# Patient Record
Sex: MALE | Race: WHITE | NOT HISPANIC OR LATINO | URBAN - METROPOLITAN AREA
[De-identification: names, ages, dates, MRNs, and addresses within clinical notes are randomized per-mention and may not be internally consistent; named-entity substitution may affect disease eponyms.]

---

## 2018-03-05 ENCOUNTER — INPATIENT (INPATIENT)
Facility: HOSPITAL | Age: 68
LOS: 3 days | Discharge: STILL ADMITTED OR ADMINISTRATIVE | DRG: 638 | End: 2018-03-09
Attending: INTERNAL MEDICINE | Admitting: HOSPITALIST
Payer: COMMERCIAL

## 2018-03-05 VITALS
HEART RATE: 79 BPM | DIASTOLIC BLOOD PRESSURE: 86 MMHG | RESPIRATION RATE: 16 BRPM | TEMPERATURE: 99 F | SYSTOLIC BLOOD PRESSURE: 118 MMHG | OXYGEN SATURATION: 95 % | HEIGHT: 70 IN | WEIGHT: 199.96 LBS

## 2018-03-05 DIAGNOSIS — Z29.9 ENCOUNTER FOR PROPHYLACTIC MEASURES, UNSPECIFIED: ICD-10-CM

## 2018-03-05 DIAGNOSIS — G40.909 EPILEPSY, UNSPECIFIED, NOT INTRACTABLE, WITHOUT STATUS EPILEPTICUS: ICD-10-CM

## 2018-03-05 DIAGNOSIS — E78.5 HYPERLIPIDEMIA, UNSPECIFIED: ICD-10-CM

## 2018-03-05 DIAGNOSIS — H40.9 UNSPECIFIED GLAUCOMA: ICD-10-CM

## 2018-03-05 DIAGNOSIS — I10 ESSENTIAL (PRIMARY) HYPERTENSION: ICD-10-CM

## 2018-03-05 DIAGNOSIS — E87.1 HYPO-OSMOLALITY AND HYPONATREMIA: ICD-10-CM

## 2018-03-05 DIAGNOSIS — E11.01 TYPE 2 DIABETES MELLITUS WITH HYPEROSMOLARITY WITH COMA: ICD-10-CM

## 2018-03-05 DIAGNOSIS — K59.00 CONSTIPATION, UNSPECIFIED: ICD-10-CM

## 2018-03-05 DIAGNOSIS — E11.9 TYPE 2 DIABETES MELLITUS WITHOUT COMPLICATIONS: ICD-10-CM

## 2018-03-05 DIAGNOSIS — F31.9 BIPOLAR DISORDER, UNSPECIFIED: ICD-10-CM

## 2018-03-05 LAB
ACETONE SERPL-MCNC: NEGATIVE — SIGNIFICANT CHANGE UP
ALBUMIN SERPL ELPH-MCNC: 4.5 G/DL — SIGNIFICANT CHANGE UP (ref 3.3–5.2)
ALP SERPL-CCNC: 135 U/L — HIGH (ref 40–120)
ALT FLD-CCNC: 12 U/L — SIGNIFICANT CHANGE UP
ANION GAP SERPL CALC-SCNC: 19 MMOL/L — HIGH (ref 5–17)
APPEARANCE UR: CLEAR — SIGNIFICANT CHANGE UP
AST SERPL-CCNC: 13 U/L — SIGNIFICANT CHANGE UP
BASE EXCESS BLDV CALC-SCNC: 3.9 MMOL/L — HIGH (ref -2–2)
BASOPHILS # BLD AUTO: 0 K/UL — SIGNIFICANT CHANGE UP (ref 0–0.2)
BASOPHILS NFR BLD AUTO: 0.1 % — SIGNIFICANT CHANGE UP (ref 0–2)
BILIRUB SERPL-MCNC: 0.7 MG/DL — SIGNIFICANT CHANGE UP (ref 0.4–2)
BILIRUB UR-MCNC: NEGATIVE — SIGNIFICANT CHANGE UP
BUN SERPL-MCNC: 35 MG/DL — HIGH (ref 8–20)
CA-I SERPL-SCNC: 1.13 MMOL/L — LOW (ref 1.15–1.33)
CALCIUM SERPL-MCNC: 10.5 MG/DL — HIGH (ref 8.6–10.2)
CHLORIDE BLDV-SCNC: 101 MMOL/L — SIGNIFICANT CHANGE UP (ref 98–107)
CHLORIDE SERPL-SCNC: 85 MMOL/L — LOW (ref 98–107)
CK MB CFR SERPL CALC: 2 NG/ML — SIGNIFICANT CHANGE UP (ref 0–6.7)
CK SERPL-CCNC: 165 U/L — SIGNIFICANT CHANGE UP (ref 30–200)
CO2 SERPL-SCNC: 28 MMOL/L — SIGNIFICANT CHANGE UP (ref 22–29)
COLOR SPEC: YELLOW — SIGNIFICANT CHANGE UP
CREAT SERPL-MCNC: 1.47 MG/DL — HIGH (ref 0.5–1.3)
DIFF PNL FLD: NEGATIVE — SIGNIFICANT CHANGE UP
EOSINOPHIL # BLD AUTO: 0 K/UL — SIGNIFICANT CHANGE UP (ref 0–0.5)
EOSINOPHIL NFR BLD AUTO: 0.1 % — SIGNIFICANT CHANGE UP (ref 0–5)
GAS PNL BLDV: 146 MMOL/L — HIGH (ref 135–145)
GAS PNL BLDV: SIGNIFICANT CHANGE UP
GAS PNL BLDV: SIGNIFICANT CHANGE UP
GLUCOSE BLDC GLUCOMTR-MCNC: 519 MG/DL — CRITICAL HIGH (ref 70–99)
GLUCOSE BLDV-MCNC: 680 MG/DL — CRITICAL HIGH (ref 70–99)
GLUCOSE SERPL-MCNC: 1092 MG/DL — CRITICAL HIGH (ref 70–115)
GLUCOSE UR QL: 1000 MG/DL
HCO3 BLDV-SCNC: 28 MMOL/L — HIGH (ref 20–26)
HCT VFR BLD CALC: 45.2 % — SIGNIFICANT CHANGE UP (ref 42–52)
HCT VFR BLDA CALC: 51 — HIGH (ref 39–50)
HGB BLD CALC-MCNC: 16.6 G/DL — SIGNIFICANT CHANGE UP (ref 13–17)
HGB BLD-MCNC: 15.8 G/DL — SIGNIFICANT CHANGE UP (ref 14–18)
KETONES UR-MCNC: NEGATIVE — SIGNIFICANT CHANGE UP
LACTATE BLDV-MCNC: 2.3 MMOL/L — HIGH (ref 0.5–2)
LEUKOCYTE ESTERASE UR-ACNC: NEGATIVE — SIGNIFICANT CHANGE UP
LYMPHOCYTES # BLD AUTO: 1 K/UL — SIGNIFICANT CHANGE UP (ref 1–4.8)
LYMPHOCYTES # BLD AUTO: 7.9 % — LOW (ref 20–55)
MCHC RBC-ENTMCNC: 28.1 PG — SIGNIFICANT CHANGE UP (ref 27–31)
MCHC RBC-ENTMCNC: 34.1 G/DL — SIGNIFICANT CHANGE UP (ref 32–36)
MCV RBC AUTO: 80.3 FL — SIGNIFICANT CHANGE UP (ref 80–94)
MONOCYTES # BLD AUTO: 0.9 K/UL — HIGH (ref 0–0.8)
MONOCYTES NFR BLD AUTO: 6.9 % — SIGNIFICANT CHANGE UP (ref 3–10)
NEUTROPHILS # BLD AUTO: 10.9 K/UL — HIGH (ref 1.8–8)
NEUTROPHILS NFR BLD AUTO: 84.6 % — HIGH (ref 37–73)
NITRITE UR-MCNC: NEGATIVE — SIGNIFICANT CHANGE UP
OTHER CELLS CSF MANUAL: 22 ML/DL — SIGNIFICANT CHANGE UP (ref 18–22)
PCO2 BLDV: 61 MMHG — HIGH (ref 35–50)
PH BLDV: 7.33 — SIGNIFICANT CHANGE UP (ref 7.32–7.43)
PH UR: 6 — SIGNIFICANT CHANGE UP (ref 5–8)
PLATELET # BLD AUTO: 189 K/UL — SIGNIFICANT CHANGE UP (ref 150–400)
PO2 BLDV: 186 MMHG — HIGH (ref 25–45)
POTASSIUM BLDV-SCNC: 5.8 MMOL/L — HIGH (ref 3.4–4.5)
POTASSIUM SERPL-MCNC: 4.8 MMOL/L — SIGNIFICANT CHANGE UP (ref 3.5–5.3)
POTASSIUM SERPL-SCNC: 4.8 MMOL/L — SIGNIFICANT CHANGE UP (ref 3.5–5.3)
PROT SERPL-MCNC: 9.4 G/DL — HIGH (ref 6.6–8.7)
PROT UR-MCNC: NEGATIVE MG/DL — SIGNIFICANT CHANGE UP
RBC # BLD: 5.62 M/UL — SIGNIFICANT CHANGE UP (ref 4.6–6.2)
RBC # FLD: 13.1 % — SIGNIFICANT CHANGE UP (ref 11–15.6)
SAO2 % BLDV: 99 % — SIGNIFICANT CHANGE UP
SODIUM SERPL-SCNC: 132 MMOL/L — LOW (ref 135–145)
SP GR SPEC: 1 — LOW (ref 1.01–1.02)
TROPONIN T SERPL-MCNC: <0.01 NG/ML — SIGNIFICANT CHANGE UP (ref 0–0.06)
UROBILINOGEN FLD QL: NEGATIVE MG/DL — SIGNIFICANT CHANGE UP
WBC # BLD: 12.9 K/UL — HIGH (ref 4.8–10.8)
WBC # FLD AUTO: 12.9 K/UL — HIGH (ref 4.8–10.8)

## 2018-03-05 PROCEDURE — 99223 1ST HOSP IP/OBS HIGH 75: CPT | Mod: GC

## 2018-03-05 PROCEDURE — 99285 EMERGENCY DEPT VISIT HI MDM: CPT

## 2018-03-05 PROCEDURE — 70450 CT HEAD/BRAIN W/O DYE: CPT | Mod: 26

## 2018-03-05 PROCEDURE — 93010 ELECTROCARDIOGRAM REPORT: CPT

## 2018-03-05 RX ORDER — ISOSORBIDE MONONITRATE 60 MG/1
30 TABLET, EXTENDED RELEASE ORAL DAILY
Qty: 0 | Refills: 0 | Status: DISCONTINUED | OUTPATIENT
Start: 2018-03-06 | End: 2018-03-07

## 2018-03-05 RX ORDER — INSULIN HUMAN 100 [IU]/ML
10 INJECTION, SOLUTION SUBCUTANEOUS ONCE
Qty: 0 | Refills: 0 | Status: COMPLETED | OUTPATIENT
Start: 2018-03-05 | End: 2018-03-05

## 2018-03-05 RX ORDER — INSULIN LISPRO 100/ML
VIAL (ML) SUBCUTANEOUS
Qty: 0 | Refills: 0 | Status: DISCONTINUED | OUTPATIENT
Start: 2018-03-05 | End: 2018-03-06

## 2018-03-05 RX ORDER — QUETIAPINE FUMARATE 200 MG/1
100 TABLET, FILM COATED ORAL DAILY
Qty: 0 | Refills: 0 | Status: DISCONTINUED | OUTPATIENT
Start: 2018-03-06 | End: 2018-03-09

## 2018-03-05 RX ORDER — INSULIN GLARGINE 100 [IU]/ML
15 INJECTION, SOLUTION SUBCUTANEOUS AT BEDTIME
Qty: 0 | Refills: 0 | Status: DISCONTINUED | OUTPATIENT
Start: 2018-03-06 | End: 2018-03-06

## 2018-03-05 RX ORDER — DIVALPROEX SODIUM 500 MG/1
500 TABLET, DELAYED RELEASE ORAL DAILY
Qty: 0 | Refills: 0 | Status: DISCONTINUED | OUTPATIENT
Start: 2018-03-06 | End: 2018-03-09

## 2018-03-05 RX ORDER — DOCUSATE SODIUM 100 MG
100 CAPSULE ORAL
Qty: 0 | Refills: 0 | Status: DISCONTINUED | OUTPATIENT
Start: 2018-03-05 | End: 2018-03-09

## 2018-03-05 RX ORDER — DEXTROSE 50 % IN WATER 50 %
25 SYRINGE (ML) INTRAVENOUS ONCE
Qty: 0 | Refills: 0 | Status: DISCONTINUED | OUTPATIENT
Start: 2018-03-05 | End: 2018-03-06

## 2018-03-05 RX ORDER — INSULIN LISPRO 100/ML
VIAL (ML) SUBCUTANEOUS AT BEDTIME
Qty: 0 | Refills: 0 | Status: DISCONTINUED | OUTPATIENT
Start: 2018-03-05 | End: 2018-03-06

## 2018-03-05 RX ORDER — INSULIN GLARGINE 100 [IU]/ML
15 INJECTION, SOLUTION SUBCUTANEOUS AT BEDTIME
Qty: 0 | Refills: 0 | Status: COMPLETED | OUTPATIENT
Start: 2018-03-05 | End: 2018-03-05

## 2018-03-05 RX ORDER — ONDANSETRON 8 MG/1
4 TABLET, FILM COATED ORAL ONCE
Qty: 0 | Refills: 0 | Status: DISCONTINUED | OUTPATIENT
Start: 2018-03-05 | End: 2018-03-05

## 2018-03-05 RX ORDER — DEXTROSE 50 % IN WATER 50 %
1 SYRINGE (ML) INTRAVENOUS ONCE
Qty: 0 | Refills: 0 | Status: DISCONTINUED | OUTPATIENT
Start: 2018-03-05 | End: 2018-03-06

## 2018-03-05 RX ORDER — GLUCAGON INJECTION, SOLUTION 0.5 MG/.1ML
1 INJECTION, SOLUTION SUBCUTANEOUS ONCE
Qty: 0 | Refills: 0 | Status: DISCONTINUED | OUTPATIENT
Start: 2018-03-05 | End: 2018-03-06

## 2018-03-05 RX ORDER — PANTOPRAZOLE SODIUM 20 MG/1
40 TABLET, DELAYED RELEASE ORAL ONCE
Qty: 0 | Refills: 0 | Status: DISCONTINUED | OUTPATIENT
Start: 2018-03-05 | End: 2018-03-05

## 2018-03-05 RX ORDER — LATANOPROST 0.05 MG/ML
1 SOLUTION/ DROPS OPHTHALMIC; TOPICAL AT BEDTIME
Qty: 0 | Refills: 0 | Status: DISCONTINUED | OUTPATIENT
Start: 2018-03-05 | End: 2018-03-09

## 2018-03-05 RX ORDER — BRIMONIDINE TARTRATE 2 MG/MG
1 SOLUTION/ DROPS OPHTHALMIC EVERY 12 HOURS
Qty: 0 | Refills: 0 | Status: DISCONTINUED | OUTPATIENT
Start: 2018-03-06 | End: 2018-03-09

## 2018-03-05 RX ORDER — SODIUM CHLORIDE 9 MG/ML
2000 INJECTION INTRAMUSCULAR; INTRAVENOUS; SUBCUTANEOUS ONCE
Qty: 0 | Refills: 0 | Status: COMPLETED | OUTPATIENT
Start: 2018-03-05 | End: 2018-03-05

## 2018-03-05 RX ORDER — SODIUM CHLORIDE 9 MG/ML
1000 INJECTION INTRAMUSCULAR; INTRAVENOUS; SUBCUTANEOUS
Qty: 0 | Refills: 0 | Status: DISCONTINUED | OUTPATIENT
Start: 2018-03-05 | End: 2018-03-07

## 2018-03-05 RX ORDER — HALOPERIDOL DECANOATE 100 MG/ML
50 INJECTION INTRAMUSCULAR
Qty: 0 | Refills: 0 | Status: CANCELLED | OUTPATIENT
Start: 2018-03-26 | End: 2018-03-09

## 2018-03-05 RX ORDER — POLYETHYLENE GLYCOL 3350 17 G/17G
17 POWDER, FOR SOLUTION ORAL DAILY
Qty: 0 | Refills: 0 | Status: DISCONTINUED | OUTPATIENT
Start: 2018-03-05 | End: 2018-03-09

## 2018-03-05 RX ORDER — SODIUM CHLORIDE 9 MG/ML
1000 INJECTION, SOLUTION INTRAVENOUS
Qty: 0 | Refills: 0 | Status: DISCONTINUED | OUTPATIENT
Start: 2018-03-05 | End: 2018-03-06

## 2018-03-05 RX ORDER — SODIUM CHLORIDE 9 MG/ML
1000 INJECTION INTRAMUSCULAR; INTRAVENOUS; SUBCUTANEOUS ONCE
Qty: 0 | Refills: 0 | Status: COMPLETED | OUTPATIENT
Start: 2018-03-05 | End: 2018-03-05

## 2018-03-05 RX ORDER — DIVALPROEX SODIUM 500 MG/1
1000 TABLET, DELAYED RELEASE ORAL DAILY
Qty: 0 | Refills: 0 | Status: DISCONTINUED | OUTPATIENT
Start: 2018-03-06 | End: 2018-03-09

## 2018-03-05 RX ORDER — INSULIN HUMAN 100 [IU]/ML
8 INJECTION, SOLUTION SUBCUTANEOUS ONCE
Qty: 0 | Refills: 0 | Status: COMPLETED | OUTPATIENT
Start: 2018-03-05 | End: 2018-03-05

## 2018-03-05 RX ORDER — QUETIAPINE FUMARATE 200 MG/1
300 TABLET, FILM COATED ORAL DAILY
Qty: 0 | Refills: 0 | Status: DISCONTINUED | OUTPATIENT
Start: 2018-03-06 | End: 2018-03-09

## 2018-03-05 RX ORDER — FLUOXETINE HCL 10 MG
20 CAPSULE ORAL DAILY
Qty: 0 | Refills: 0 | Status: DISCONTINUED | OUTPATIENT
Start: 2018-03-06 | End: 2018-03-09

## 2018-03-05 RX ORDER — METOPROLOL TARTRATE 50 MG
50 TABLET ORAL EVERY 12 HOURS
Qty: 0 | Refills: 0 | Status: DISCONTINUED | OUTPATIENT
Start: 2018-03-06 | End: 2018-03-06

## 2018-03-05 RX ORDER — ENOXAPARIN SODIUM 100 MG/ML
40 INJECTION SUBCUTANEOUS EVERY 24 HOURS
Qty: 0 | Refills: 0 | Status: DISCONTINUED | OUTPATIENT
Start: 2018-03-05 | End: 2018-03-06

## 2018-03-05 RX ORDER — SIMVASTATIN 20 MG/1
20 TABLET, FILM COATED ORAL AT BEDTIME
Qty: 0 | Refills: 0 | Status: DISCONTINUED | OUTPATIENT
Start: 2018-03-05 | End: 2018-03-09

## 2018-03-05 RX ORDER — DEXTROSE 50 % IN WATER 50 %
12.5 SYRINGE (ML) INTRAVENOUS ONCE
Qty: 0 | Refills: 0 | Status: DISCONTINUED | OUTPATIENT
Start: 2018-03-05 | End: 2018-03-06

## 2018-03-05 RX ORDER — LITHIUM CARBONATE 300 MG/1
450 TABLET, EXTENDED RELEASE ORAL DAILY
Qty: 0 | Refills: 0 | Status: DISCONTINUED | OUTPATIENT
Start: 2018-03-06 | End: 2018-03-09

## 2018-03-05 RX ADMIN — INSULIN HUMAN 10 UNIT(S): 100 INJECTION, SOLUTION SUBCUTANEOUS at 22:24

## 2018-03-05 RX ADMIN — SODIUM CHLORIDE 2000 MILLILITER(S): 9 INJECTION INTRAMUSCULAR; INTRAVENOUS; SUBCUTANEOUS at 15:00

## 2018-03-05 RX ADMIN — INSULIN HUMAN 8 UNIT(S): 100 INJECTION, SOLUTION SUBCUTANEOUS at 18:45

## 2018-03-05 RX ADMIN — SODIUM CHLORIDE 150 MILLILITER(S): 9 INJECTION INTRAMUSCULAR; INTRAVENOUS; SUBCUTANEOUS at 23:26

## 2018-03-05 RX ADMIN — Medication 100 MILLIGRAM(S): at 23:00

## 2018-03-05 RX ADMIN — INSULIN HUMAN 10 UNIT(S): 100 INJECTION, SOLUTION SUBCUTANEOUS at 16:00

## 2018-03-05 RX ADMIN — LATANOPROST 1 DROP(S): 0.05 SOLUTION/ DROPS OPHTHALMIC; TOPICAL at 22:24

## 2018-03-05 RX ADMIN — SODIUM CHLORIDE 1000 MILLILITER(S): 9 INJECTION INTRAMUSCULAR; INTRAVENOUS; SUBCUTANEOUS at 18:45

## 2018-03-05 RX ADMIN — INSULIN GLARGINE 15 UNIT(S): 100 INJECTION, SOLUTION SUBCUTANEOUS at 23:42

## 2018-03-05 RX ADMIN — INSULIN HUMAN 10 UNIT(S): 100 INJECTION, SOLUTION SUBCUTANEOUS at 23:42

## 2018-03-05 NOTE — H&P ADULT - PROBLEM SELECTOR PLAN 3
Controlled, stable  -c/t outpatient regimen of depakote (500mg ER at 10am, 1000mg ER at 6pm)  -f/up am depakote level

## 2018-03-05 NOTE — H&P ADULT - PROBLEM SELECTOR PLAN 6
Controlled outpatient  -c/t home med metoprolol 50 BID starting 3/6 at 10am and 6pm  -c/t isosorbide mononitrate ER 30 daily at 10am

## 2018-03-05 NOTE — ED ADULT TRIAGE NOTE - CHIEF COMPLAINT QUOTE
sent from nursing home for hyperglycemia. hx of diabetes. read high on glucometer. has no complaints. alert able to state name. has hx of dementia, baseline confused. breathing even and unlabored.

## 2018-03-05 NOTE — H&P ADULT - NSHPSOCIALHISTORY_GEN_ALL_CORE
Lives alone at [facility] and rehab  Ambulates "independently" though patient states he "falls a lot and requires 2-3 people to assist him"  Never smoked, no drugs,   Last alcoholic drink 8 years ago, had 1 pint of scotch/whiskey a day

## 2018-03-05 NOTE — ED ADULT NURSE NOTE - PMH
Bipolar 1 disorder    Depression    Diabetes    Edema    Hepatitis    HTN (hypertension)    Hyperlipidemia    Seizure disorder

## 2018-03-05 NOTE — H&P ADULT - NSHPLABSRESULTS_GEN_ALL_CORE
15.8   12.9  )-----------( 189      ( 05 Mar 2018 15:04 )             45.2   03-05    132<L>  |  85<L>  |  35.0<H>  ----------------------------<  1092<HH>  4.8   |  28.0  |  1.47<H>    Ca    10.5<H>      05 Mar 2018 15:04    TPro  9.4<H>  /  Alb  4.5  /  TBili  0.7  /  DBili  x   /  AST  13  /  ALT  12  /  AlkPhos  135<H>  03-05    EKG: NSR Pulse 93bpm

## 2018-03-05 NOTE — H&P ADULT - NSHPPHYSICALEXAM_GEN_ALL_CORE
Vital Signs Last 24 Hrs  T(C): 36.2 (05 Mar 2018 21:23), Max: 37.1 (05 Mar 2018 14:30)  T(F): 97.1 (05 Mar 2018 21:23), Max: 98.7 (05 Mar 2018 14:30)  HR: 93 (05 Mar 2018 21:23) (79 - 93)  BP: 156/96 (05 Mar 2018 21:23) (118/86 - 156/96)  BP(mean): --  RR: 16 (05 Mar 2018 21:23) (16 - 16)  SpO2: 95% (05 Mar 2018 21:23) (95% - 95%)    PHYSICAL EXAM:      Constitutional: NAD, soaked in urine in stretcher, malodorous  HEENT: PERRLA, EOMI, Normal Hearing  Neck: No JVD, supple  Back: Normal spine flexure, No CVA tenderness  Respiratory: CTABL no w/r/r  Cardiovascular: S1 and S2, RRR, no m/r/g; +TTP diffusely across sternum;   Gastrointestinal: BS+ normoactive, softly distended, +diffuse mild TTP across all quadrants, patient stating "I feel like I have to go to the bathroom", no suprapubic discomfort, no guarding/rigidity  Extremities: No c/c/e  Vascular: 2+ peripheral pulses  Neurological: AAO x 1 (to self, not to place or year), no focal deficits; sensation intact in LE b/l  Psychiatric: Pleasant mood, normal affect, mildly dysarthric speech  Musculoskeletal: 5/5 strength b/l upper and lower extremities  Skin: +midline surgical scar extending horizontally across top of sternum , small 1 inch L sided scar on neck, both non-erythematous, no discharge, NTTP Vital Signs Last 24 Hrs  T(C): 36.2 (05 Mar 2018 21:23), Max: 37.1 (05 Mar 2018 14:30)  T(F): 97.1 (05 Mar 2018 21:23), Max: 98.7 (05 Mar 2018 14:30)  HR: 93 (05 Mar 2018 21:23) (79 - 93)  BP: 156/96 (05 Mar 2018 21:23) (118/86 - 156/96)  BP(mean): --  RR: 16 (05 Mar 2018 21:23) (16 - 16)  SpO2: 95% (05 Mar 2018 21:23) (95% - 95%)    PHYSICAL EXAM:      Constitutional: NAD, soaked in urine in stretcher, malodorous  HEENT: PERRLA, EOMI, Normal Hearing  Neck: No JVD, supple  Back: Normal spine flexure, No CVA tenderness  Respiratory: CTABL no w/r/r  Cardiovascular: S1 and S2, RRR, no m/r/g; +TTP diffusely across sternum;   Gastrointestinal: BS+ normoactive, softly distended, +diffuse mild TTP across all quadrants, patient stating "I feel like I have to go to the bathroom", no suprapubic discomfort, no guarding/rigidity  Extremities: No c/c/e  Vascular: 2+ peripheral pulses  Neurological: AAO x 1 (to self, not to place or year), no focal deficits; sensation intact in LE b/l  Psychiatric: Pleasant mood, normal affect, mildly dysarthric speech  Musculoskeletal: 5/5 strength b/l upper and lower extremities  Skin: +midline surgical scar extending horizontally across top of sternum , small 1 inch L sided scar on neck, both non-erythematous, no discharge, NTTP; skin between toes intact, yellowish toenails not crumbling, no signs of acute foot infection

## 2018-03-05 NOTE — H&P ADULT - PROBLEM SELECTOR PLAN 2
DMII , on Januvia outpatient (100mg daily), was discontinued off lantus around July 2017 (unknown why), no longer on metformin   -last a1c known was in 2015 at 8.2, no recent known  -f/up am HbA1C  -uncontrolled with poor diet  -HSS moderate CHEST PAIN: acute, likely 2/2 costochondritis but concern for arrythmia given hyperosmlar nonketotic state, will monitor glucose closely and trend troponins and CK  -EKG NSR no signs of acute ST changes  -Troponin <0.01

## 2018-03-05 NOTE — H&P ADULT - PROBLEM SELECTOR PLAN 9
Patient stating difficulty ambulating with 2-3 person assist (though poor historian), despite [facility] chart stating independently ambulates will c/t ambulation with assistance for now and f/up PT consult  -lovenox 40mg SQ for DVT prophylaxis Patient stating LBM unknown but "many days ago", with softly distended abdomen with discomfort to palpation  -adding miralax and colace  -will c/t monitor

## 2018-03-05 NOTE — H&P ADULT - HISTORY OF PRESENT ILLNESS
Mr. Daley is a 68yo M with PMH s/f dementia, DMII, hyperlipidemia, depression, glaucoma who was sent from [facility] for [facility] and rehab due to "hyperglycemia, alert with confusion". Patient is a poor historian, states that he was at home today and felt unwell, says "I have to go to the hospital now or I won't feel good, can you take me?". He states he currently has constipation, unable to recall LBM,  and feels that he urinates too frequently and wets the bed.    States he currently has chest discomfort, pointing diffusely to his entire sternum area. Denies headache, changes in vision, back pain, abdominal pain, n/v, f/c. Other ROS unremarkable.   States he is unable to ambulate well on his own and "usually falls a lot" despite chart stating he ambulates independently. Mr. Daley is a 68yo M with PMH s/f dementia, DMII,  hyperlipidemia, biploar disorder (type I?), glaucoma who was sent from [facility] for health and rehab due to "hyperglycemia, alert with confusion". Patient is a poor historian, states that he was at home today and felt unwell, says "I have to go to the hospital now or I won't feel good, can you take me?". He states he currently has constipation, unable to recall LBM,  and feels that he urinates too frequently and wets the bed.    States he currently has chest discomfort, pointing diffusely to his entire sternum area. Denies headache, changes in vision, back pain, abdominal pain, n/v, f/c. Other ROS unremarkable.   States he is unable to ambulate well on his own and "usually falls a lot" despite chart stating he ambulates independently.     As per Brendon (via phone), patient was discontinued off lantus many months ago (around July) for unknown reasons, was seen "new sodas" yesterday. Mr. Daley is a 68yo M with PMH s/f dementia, DMII,  hyperlipidemia, biploar disorder (type I?), glaucoma who was sent from [facility] for health and rehab due to "hyperglycemia, alert with confusion". Patient is a poor historian, states that he was at home today and felt unwell, says "I have to go to the hospital now or I won't feel good, can you take me?". He states he currently has constipation, unable to recall LBM,  and feels that he urinates too frequently and wets the bed.    States he currently has chest discomfort, pointing diffusely to his entire sternum area. Denies headache, changes in vision, back pain, abdominal pain, n/v, f/c. Other ROS unremarkable.   States he is unable to ambulate well on his own and "usually falls a lot" despite chart stating he ambulates independently.     As per Brendon (via phone), patient was discontinued off lantus many months ago (around July) for unknown reasons, was seen "guzzling sodas" yesterday.     CODE STATUS: FULL CODE Mr. Daley is a 68yo M with PMH s/f dementia, DMII,  hyperlipidemia, biploar disorder (type I?), glaucoma who was sent from [facility] for health and rehab due to "hyperglycemia, alert with confusion". Patient is a poor historian, states that he was at home today and felt unwell, says "I have to go to the hospital now or I won't feel good, can you take me?". He states he currently has constipation, unable to recall LBM,  and feels that he urinates too frequently and wets the bed.    States he currently has chest discomfort, pointing diffusely to his entire sternum area. Denies headache, changes in vision, back pain, abdominal pain, n/v, f/c. Other ROS unremarkable.   States he is unable to ambulate well on his own and "usually falls a lot" despite chart stating he ambulates independently.     As per Brendon (via phone), patient was discontinued off lantus many months ago (around July) for unknown reasons, was seen "guzzling sodas" yesterday. Patient inconsistently takes Januvia    CODE STATUS: FULL CODE

## 2018-03-05 NOTE — H&P ADULT - PROBLEM SELECTOR PLAN 5
pseudohyponatremia due to hyperglycemic state  -c/t monitor  -vitals per routine Stable, controlled on medication  -c/t quetiapine 100mg at 10am, 6pm and 300mg at 9pm every day   -c/t haloperidol decanoate 50mg/ml IM every 4 weeks on 4th monday of month at 3pm-11pm, next dose due on 3/26/18  -c/t prozac 20 QD at 10am for depression  -c/t lithium 450mg daily at 10am for bipolar disorder  -f/up am lithium level  -consult placed for psychiatry while inpatient, will f/up

## 2018-03-05 NOTE — H&P ADULT - PROBLEM SELECTOR PLAN 4
Stable, controlled on medication  -c/t quetiapine 100mg at 10am, 6pm and 300mg at 9pm every day   -c/t haloperidol decanoate 50mg/ml IM every 4 weeks on 4th monday of month at 3pm-11pm, next dose due on 3/26/18  -c/t prozac 20 QD at 10am for depression  -c/t lithium 450mg daily at 10am for bipolar disorder  -f/up am lithium level  -consult placed for psychiatry while inpatient, will f/up pseudohyponatremia due to hyperglycemic state  -corrected sodium 132  -c/t monitor  -vitals per routine

## 2018-03-05 NOTE — ED ADULT NURSE REASSESSMENT NOTE - NS ED NURSE REASSESS COMMENT FT1
pt received from day RN. Pt had critical value of elevated glucose and lactate. MD aware. Pt alert and oriented to person, and place. pt in yellow gown for safety. #20 to RAC, patent. will continue to monitor.

## 2018-03-05 NOTE — H&P ADULT - PROBLEM SELECTOR PLAN 10
Patient stating difficulty ambulating with 2-3 person assist (though poor historian), despite [facility] chart stating independently ambulates will c/t ambulation with assistance for now and f/up PT consult  -lovenox 40mg SQ for DVT prophylaxis Patient stating difficulty ambulating with 2-3 person assist (though poor historian), despite [facility] chart stating independently ambulates will c/t ambulation with assistance for now and f/up PT consult  -lovenox 40mg SQ for DVT prophylaxis  11)CHEST PAIN: acute, likely 2/2 costochondritis but concern for arrythmia given hyperosmlar nonketotic state, will monitor glucose closely and trend troponins and CK  -EKG NSR no signs of acute ST changes Patient stating difficulty ambulating with 2-3 person assist (though poor historian), despite [facility] chart stating independently ambulates will c/t ambulation with assistance for now and f/up PT consult  -lovenox 40mg SQ for DVT prophylaxis  11)CHEST PAIN: acute, likely 2/2 costochondritis but concern for arrythmia given hyperosmlar nonketotic state, will monitor glucose closely and trend troponins and CK  -EKG NSR no signs of acute ST changes  -Troponin <0.01

## 2018-03-05 NOTE — ED PROVIDER NOTE - OBJECTIVE STATEMENT
67 year old male with PMH DM, dementia, bipolar, htn, hld, seizures presents from [facility] for hyperglycemia and lethargy. As per EMS, pt has been non-complaint with his medications and his blood sugar just read "high". He denies abd pain, nausea, vomiting, diarrhea, chest pain, SOB, HA.

## 2018-03-05 NOTE — H&P ADULT - PROBLEM SELECTOR PLAN 1
Acute onset, likely 2/2 noncompliance with diet (reported to be drinking multiple sodas yesterday as per [facility] faculty)  -Glucose was 1092 on admission, then 680, now 433  -s/p insulin IV push 10 units + 10 units + 8 units  -moderate sliding scale   -NS @ 150cc/hr for one day  -diet: consistent carb, no snacks, DASH/TLC and lowfat Acute onset, likely 2/2 noncompliance with diet (reported to be drinking multiple sodas yesterday as per [facility] faculty), with inconsistent use of Januvia   -Glucose was 1092 on admission, then 680, now 433  -s/p insulin IV push 10 units + 10 units + 8 units  -moderate sliding scale   -lantus 15 QHS, will adjust as needed, based on weight will likely need 5 premeals and 15 units lantus QHS  -NS @ 150cc/hr for one day  -diet: consistent carb, no snacks, DASH/TLC and lowfat  -AM CBC, BMP, magnesium, phosphorus Acute onset, likely 2/2 noncompliance with diet (reported to be drinking multiple sodas yesterday as per [facility]), with inconsistent use of Januvia   -Glucose was 1092 on admission, then 680, now 433  -s/p insulin IV push 10 units + 10 units + 8 units  -moderate sliding scale   -lantus 15 QHS, will adjust as needed, based on weight will likely need 5 premeals and 15 units lantus QHS  -NS @ 150cc/hr for one day  -diet: consistent carb, no snacks, DASH/TLC and lowfat  -AM CBC, BMP, magnesium, phosphorus  -no ketones in urine, gap 19

## 2018-03-05 NOTE — H&P ADULT - ASSESSMENT
68yo M with PMH s/f dementia, DMII,  hyperlipidemia, biploar disorder (type I?), glaucoma who was sent from [facility] for [facility] and rehab due to "hyperglycemia, alert with confusion", found to be in nonketotic hyperosmolar hyperglycemic state (HHS), clinically improving    Admit to: inpatient level of care  Service: medicine resident  Bed type: any monitored  Ambulate: with assistance  Diet: consistent carb, DASH /TLC, lowfat

## 2018-03-05 NOTE — H&P ADULT - PROBLEM SELECTOR PLAN 8
Controlled  -c/t brimonidine 0.2% eye drops both eyes BID at 6am and 6pm  -c/t latanoprost 0.005% solution both eyes at 10pm daily

## 2018-03-06 DIAGNOSIS — R07.9 CHEST PAIN, UNSPECIFIED: ICD-10-CM

## 2018-03-06 LAB
ALBUMIN SERPL ELPH-MCNC: 3.7 G/DL — SIGNIFICANT CHANGE UP (ref 3.3–5.2)
ALP SERPL-CCNC: 96 U/L — SIGNIFICANT CHANGE UP (ref 40–120)
ALT FLD-CCNC: 10 U/L — SIGNIFICANT CHANGE UP
ANION GAP SERPL CALC-SCNC: 14 MMOL/L — SIGNIFICANT CHANGE UP (ref 5–17)
AST SERPL-CCNC: 16 U/L — SIGNIFICANT CHANGE UP
BASOPHILS # BLD AUTO: 0 K/UL — SIGNIFICANT CHANGE UP (ref 0–0.2)
BASOPHILS NFR BLD AUTO: 0.1 % — SIGNIFICANT CHANGE UP (ref 0–2)
BILIRUB SERPL-MCNC: 0.5 MG/DL — SIGNIFICANT CHANGE UP (ref 0.4–2)
BUN SERPL-MCNC: 19 MG/DL — SIGNIFICANT CHANGE UP (ref 8–20)
CALCIUM SERPL-MCNC: 8.6 MG/DL — SIGNIFICANT CHANGE UP (ref 8.6–10.2)
CHLORIDE SERPL-SCNC: 109 MMOL/L — HIGH (ref 98–107)
CHOLEST SERPL-MCNC: 195 MG/DL — SIGNIFICANT CHANGE UP (ref 110–199)
CK MB CFR SERPL CALC: 1.9 NG/ML — SIGNIFICANT CHANGE UP (ref 0–6.7)
CK MB CFR SERPL CALC: 2.3 NG/ML — SIGNIFICANT CHANGE UP (ref 0–6.7)
CK SERPL-CCNC: 156 U/L — SIGNIFICANT CHANGE UP (ref 30–200)
CK SERPL-CCNC: 222 U/L — HIGH (ref 30–200)
CO2 SERPL-SCNC: 24 MMOL/L — SIGNIFICANT CHANGE UP (ref 22–29)
CREAT SERPL-MCNC: 1.01 MG/DL — SIGNIFICANT CHANGE UP (ref 0.5–1.3)
EOSINOPHIL # BLD AUTO: 0.1 K/UL — SIGNIFICANT CHANGE UP (ref 0–0.5)
EOSINOPHIL NFR BLD AUTO: 1.2 % — SIGNIFICANT CHANGE UP (ref 0–5)
GLUCOSE BLDC GLUCOMTR-MCNC: 170 MG/DL — HIGH (ref 70–99)
GLUCOSE BLDC GLUCOMTR-MCNC: 296 MG/DL — HIGH (ref 70–99)
GLUCOSE BLDC GLUCOMTR-MCNC: 306 MG/DL — HIGH (ref 70–99)
GLUCOSE BLDC GLUCOMTR-MCNC: 351 MG/DL — HIGH (ref 70–99)
GLUCOSE BLDC GLUCOMTR-MCNC: 405 MG/DL — HIGH (ref 70–99)
GLUCOSE BLDC GLUCOMTR-MCNC: 445 MG/DL — HIGH (ref 70–99)
GLUCOSE BLDC GLUCOMTR-MCNC: 448 MG/DL — HIGH (ref 70–99)
GLUCOSE SERPL-MCNC: 318 MG/DL — HIGH (ref 70–115)
HBA1C BLD-MCNC: 11.8 % — HIGH (ref 4–5.6)
HCT VFR BLD CALC: 44.3 % — SIGNIFICANT CHANGE UP (ref 42–52)
HDLC SERPL-MCNC: 41 MG/DL — LOW
HGB BLD-MCNC: 14.8 G/DL — SIGNIFICANT CHANGE UP (ref 14–18)
LIPID PNL WITH DIRECT LDL SERPL: 94 MG/DL — SIGNIFICANT CHANGE UP
LITHIUM SERPL-MCNC: 0.3 MMOL/L — LOW (ref 0.5–1.5)
LYMPHOCYTES # BLD AUTO: 1.5 K/UL — SIGNIFICANT CHANGE UP (ref 1–4.8)
LYMPHOCYTES # BLD AUTO: 15.9 % — LOW (ref 20–55)
MAGNESIUM SERPL-MCNC: 2.2 MG/DL — SIGNIFICANT CHANGE UP (ref 1.6–2.6)
MCHC RBC-ENTMCNC: 28.1 PG — SIGNIFICANT CHANGE UP (ref 27–31)
MCHC RBC-ENTMCNC: 33.4 G/DL — SIGNIFICANT CHANGE UP (ref 32–36)
MCV RBC AUTO: 84.1 FL — SIGNIFICANT CHANGE UP (ref 80–94)
MONOCYTES # BLD AUTO: 0.5 K/UL — SIGNIFICANT CHANGE UP (ref 0–0.8)
MONOCYTES NFR BLD AUTO: 5 % — SIGNIFICANT CHANGE UP (ref 3–10)
NEUTROPHILS # BLD AUTO: 7.4 K/UL — SIGNIFICANT CHANGE UP (ref 1.8–8)
NEUTROPHILS NFR BLD AUTO: 77.5 % — HIGH (ref 37–73)
PHOSPHATE SERPL-MCNC: 2.1 MG/DL — LOW (ref 2.4–4.7)
PLATELET # BLD AUTO: 114 K/UL — LOW (ref 150–400)
POTASSIUM SERPL-MCNC: 4 MMOL/L — SIGNIFICANT CHANGE UP (ref 3.5–5.3)
POTASSIUM SERPL-SCNC: 4 MMOL/L — SIGNIFICANT CHANGE UP (ref 3.5–5.3)
PROT SERPL-MCNC: 8.3 G/DL — SIGNIFICANT CHANGE UP (ref 6.6–8.7)
RBC # BLD: 5.27 M/UL — SIGNIFICANT CHANGE UP (ref 4.6–6.2)
RBC # FLD: 13.4 % — SIGNIFICANT CHANGE UP (ref 11–15.6)
SODIUM SERPL-SCNC: 147 MMOL/L — HIGH (ref 135–145)
TOTAL CHOLESTEROL/HDL RATIO MEASUREMENT: 5 RATIO — SIGNIFICANT CHANGE UP (ref 3.4–9.6)
TRIGL SERPL-MCNC: 299 MG/DL — HIGH (ref 10–200)
TROPONIN T SERPL-MCNC: <0.01 NG/ML — SIGNIFICANT CHANGE UP (ref 0–0.06)
VALPROATE SERPL-MCNC: 23.6 UG/ML — LOW (ref 50–100)
WBC # BLD: 9.5 K/UL — SIGNIFICANT CHANGE UP (ref 4.8–10.8)
WBC # FLD AUTO: 9.5 K/UL — SIGNIFICANT CHANGE UP (ref 4.8–10.8)

## 2018-03-06 PROCEDURE — 71045 X-RAY EXAM CHEST 1 VIEW: CPT | Mod: 26

## 2018-03-06 PROCEDURE — 99233 SBSQ HOSP IP/OBS HIGH 50: CPT | Mod: GC

## 2018-03-06 PROCEDURE — 74021 RADEX ABDOMEN 3+ VIEWS: CPT | Mod: 26

## 2018-03-06 PROCEDURE — 74019 RADEX ABDOMEN 2 VIEWS: CPT | Mod: 26

## 2018-03-06 RX ORDER — INSULIN GLARGINE 100 [IU]/ML
15 INJECTION, SOLUTION SUBCUTANEOUS AT BEDTIME
Qty: 0 | Refills: 0 | Status: DISCONTINUED | OUTPATIENT
Start: 2018-03-06 | End: 2018-03-06

## 2018-03-06 RX ORDER — GLUCAGON INJECTION, SOLUTION 0.5 MG/.1ML
1 INJECTION, SOLUTION SUBCUTANEOUS ONCE
Qty: 0 | Refills: 0 | Status: DISCONTINUED | OUTPATIENT
Start: 2018-03-06 | End: 2018-03-09

## 2018-03-06 RX ORDER — SODIUM CHLORIDE 9 MG/ML
2000 INJECTION INTRAMUSCULAR; INTRAVENOUS; SUBCUTANEOUS ONCE
Qty: 0 | Refills: 0 | Status: COMPLETED | OUTPATIENT
Start: 2018-03-06 | End: 2018-03-06

## 2018-03-06 RX ORDER — INSULIN LISPRO 100/ML
5 VIAL (ML) SUBCUTANEOUS
Qty: 0 | Refills: 0 | Status: DISCONTINUED | OUTPATIENT
Start: 2018-03-06 | End: 2018-03-06

## 2018-03-06 RX ORDER — INSULIN HUMAN 100 [IU]/ML
10 INJECTION, SOLUTION SUBCUTANEOUS ONCE
Qty: 0 | Refills: 0 | Status: COMPLETED | OUTPATIENT
Start: 2018-03-06 | End: 2018-03-06

## 2018-03-06 RX ORDER — METOPROLOL TARTRATE 50 MG
50 TABLET ORAL EVERY 12 HOURS
Qty: 0 | Refills: 0 | Status: DISCONTINUED | OUTPATIENT
Start: 2018-03-06 | End: 2018-03-09

## 2018-03-06 RX ORDER — DEXTROSE 50 % IN WATER 50 %
1 SYRINGE (ML) INTRAVENOUS ONCE
Qty: 0 | Refills: 0 | Status: DISCONTINUED | OUTPATIENT
Start: 2018-03-06 | End: 2018-03-09

## 2018-03-06 RX ORDER — INSULIN LISPRO 100/ML
8 VIAL (ML) SUBCUTANEOUS
Qty: 0 | Refills: 0 | Status: DISCONTINUED | OUTPATIENT
Start: 2018-03-06 | End: 2018-03-06

## 2018-03-06 RX ORDER — HEPARIN SODIUM 5000 [USP'U]/ML
5000 INJECTION INTRAVENOUS; SUBCUTANEOUS EVERY 12 HOURS
Qty: 0 | Refills: 0 | Status: DISCONTINUED | OUTPATIENT
Start: 2018-03-06 | End: 2018-03-09

## 2018-03-06 RX ORDER — NITROGLYCERIN 6.5 MG
0.4 CAPSULE, EXTENDED RELEASE ORAL
Qty: 0 | Refills: 0 | Status: DISCONTINUED | OUTPATIENT
Start: 2018-03-06 | End: 2018-03-09

## 2018-03-06 RX ORDER — SODIUM CHLORIDE 9 MG/ML
1000 INJECTION, SOLUTION INTRAVENOUS
Qty: 0 | Refills: 0 | Status: DISCONTINUED | OUTPATIENT
Start: 2018-03-06 | End: 2018-03-09

## 2018-03-06 RX ORDER — INSULIN GLARGINE 100 [IU]/ML
26 INJECTION, SOLUTION SUBCUTANEOUS AT BEDTIME
Qty: 0 | Refills: 0 | Status: DISCONTINUED | OUTPATIENT
Start: 2018-03-06 | End: 2018-03-07

## 2018-03-06 RX ORDER — DEXTROSE 50 % IN WATER 50 %
12.5 SYRINGE (ML) INTRAVENOUS ONCE
Qty: 0 | Refills: 0 | Status: DISCONTINUED | OUTPATIENT
Start: 2018-03-06 | End: 2018-03-09

## 2018-03-06 RX ORDER — HYDRALAZINE HCL 50 MG
10 TABLET ORAL ONCE
Qty: 0 | Refills: 0 | Status: COMPLETED | OUTPATIENT
Start: 2018-03-06 | End: 2018-03-08

## 2018-03-06 RX ORDER — INSULIN LISPRO 100/ML
VIAL (ML) SUBCUTANEOUS AT BEDTIME
Qty: 0 | Refills: 0 | Status: DISCONTINUED | OUTPATIENT
Start: 2018-03-06 | End: 2018-03-06

## 2018-03-06 RX ORDER — DEXTROSE 50 % IN WATER 50 %
25 SYRINGE (ML) INTRAVENOUS ONCE
Qty: 0 | Refills: 0 | Status: DISCONTINUED | OUTPATIENT
Start: 2018-03-06 | End: 2018-03-09

## 2018-03-06 RX ORDER — ACETAMINOPHEN 500 MG
650 TABLET ORAL EVERY 6 HOURS
Qty: 0 | Refills: 0 | Status: DISCONTINUED | OUTPATIENT
Start: 2018-03-06 | End: 2018-03-09

## 2018-03-06 RX ORDER — INSULIN LISPRO 100/ML
VIAL (ML) SUBCUTANEOUS
Qty: 0 | Refills: 0 | Status: DISCONTINUED | OUTPATIENT
Start: 2018-03-06 | End: 2018-03-09

## 2018-03-06 RX ORDER — INSULIN LISPRO 100/ML
10 VIAL (ML) SUBCUTANEOUS
Qty: 0 | Refills: 0 | Status: DISCONTINUED | OUTPATIENT
Start: 2018-03-06 | End: 2018-03-07

## 2018-03-06 RX ORDER — INSULIN LISPRO 100/ML
VIAL (ML) SUBCUTANEOUS
Qty: 0 | Refills: 0 | Status: DISCONTINUED | OUTPATIENT
Start: 2018-03-06 | End: 2018-03-06

## 2018-03-06 RX ADMIN — POLYETHYLENE GLYCOL 3350 17 GRAM(S): 17 POWDER, FOR SOLUTION ORAL at 01:34

## 2018-03-06 RX ADMIN — Medication 50 MILLIGRAM(S): at 12:43

## 2018-03-06 RX ADMIN — POLYETHYLENE GLYCOL 3350 17 GRAM(S): 17 POWDER, FOR SOLUTION ORAL at 12:43

## 2018-03-06 RX ADMIN — Medication 100 MILLIGRAM(S): at 05:26

## 2018-03-06 RX ADMIN — Medication 5 UNIT(S): at 08:15

## 2018-03-06 RX ADMIN — HEPARIN SODIUM 5000 UNIT(S): 5000 INJECTION INTRAVENOUS; SUBCUTANEOUS at 18:12

## 2018-03-06 RX ADMIN — Medication 8 UNIT(S): at 12:46

## 2018-03-06 RX ADMIN — ISOSORBIDE MONONITRATE 30 MILLIGRAM(S): 60 TABLET, EXTENDED RELEASE ORAL at 12:43

## 2018-03-06 RX ADMIN — Medication 10: at 18:12

## 2018-03-06 RX ADMIN — Medication 50 MILLIGRAM(S): at 23:58

## 2018-03-06 RX ADMIN — LATANOPROST 1 DROP(S): 0.05 SOLUTION/ DROPS OPHTHALMIC; TOPICAL at 23:58

## 2018-03-06 RX ADMIN — Medication 6: at 12:46

## 2018-03-06 RX ADMIN — ENOXAPARIN SODIUM 40 MILLIGRAM(S): 100 INJECTION SUBCUTANEOUS at 05:39

## 2018-03-06 RX ADMIN — Medication 100 MILLIGRAM(S): at 18:12

## 2018-03-06 RX ADMIN — SIMVASTATIN 20 MILLIGRAM(S): 20 TABLET, FILM COATED ORAL at 23:58

## 2018-03-06 RX ADMIN — BRIMONIDINE TARTRATE 1 DROP(S): 2 SOLUTION/ DROPS OPHTHALMIC at 05:26

## 2018-03-06 RX ADMIN — INSULIN HUMAN 10 UNIT(S): 100 INJECTION, SOLUTION SUBCUTANEOUS at 04:43

## 2018-03-06 RX ADMIN — INSULIN HUMAN 10 UNIT(S): 100 INJECTION, SOLUTION SUBCUTANEOUS at 02:12

## 2018-03-06 RX ADMIN — Medication 5 MILLIGRAM(S): at 22:27

## 2018-03-06 RX ADMIN — BRIMONIDINE TARTRATE 1 DROP(S): 2 SOLUTION/ DROPS OPHTHALMIC at 18:12

## 2018-03-06 RX ADMIN — Medication 10 UNIT(S): at 18:12

## 2018-03-06 RX ADMIN — SODIUM CHLORIDE 200 MILLILITER(S): 9 INJECTION INTRAMUSCULAR; INTRAVENOUS; SUBCUTANEOUS at 16:03

## 2018-03-06 RX ADMIN — QUETIAPINE FUMARATE 100 MILLIGRAM(S): 200 TABLET, FILM COATED ORAL at 12:43

## 2018-03-06 RX ADMIN — SODIUM CHLORIDE 2000 MILLILITER(S): 9 INJECTION INTRAMUSCULAR; INTRAVENOUS; SUBCUTANEOUS at 02:15

## 2018-03-06 RX ADMIN — SODIUM CHLORIDE 1000 MILLILITER(S): 9 INJECTION INTRAMUSCULAR; INTRAVENOUS; SUBCUTANEOUS at 06:35

## 2018-03-06 RX ADMIN — Medication 12: at 08:15

## 2018-03-06 RX ADMIN — Medication 20 MILLIGRAM(S): at 12:45

## 2018-03-06 RX ADMIN — LITHIUM CARBONATE 450 MILLIGRAM(S): 300 TABLET, EXTENDED RELEASE ORAL at 12:44

## 2018-03-06 RX ADMIN — INSULIN GLARGINE 26 UNIT(S): 100 INJECTION, SOLUTION SUBCUTANEOUS at 22:27

## 2018-03-06 RX ADMIN — QUETIAPINE FUMARATE 300 MILLIGRAM(S): 200 TABLET, FILM COATED ORAL at 12:46

## 2018-03-06 RX ADMIN — DIVALPROEX SODIUM 500 MILLIGRAM(S): 500 TABLET, DELAYED RELEASE ORAL at 12:45

## 2018-03-06 NOTE — PROGRESS NOTE ADULT - ASSESSMENT
66yo M with PMH s/f dementia, DMII,  hyperlipidemia, biploar disorder (type I?), glaucoma who was sent from [facility] for [facility] and rehab due to "hyperglycemia, alert with confusion", found to be in nonketotic hyperosmolar hyperglycemic state (HHS), clinically improving 66yo M with PMH s/f dementia, DMII,  hyperlipidemia, biploar disorder (type I?), glaucoma who was sent from [facility] for health and rehab due to "hyperglycemia, alert with confusion", found to be in nonketotic hyperosmolar hyperglycemic state (HHS), clinically improving, now glucose down to 300s.

## 2018-03-06 NOTE — PATIENT PROFILE ADULT. - NSTOBACCO TYPE_GEN_A_CORE_RD
I personally performed the service described in the documentation recorded by the scribe in my presence, and it accurately and completely records my words and actions.
Cigarettes

## 2018-03-06 NOTE — PROGRESS NOTE ADULT - PROBLEM SELECTOR PLAN 9
Patient stating LBM unknown but "many days ago", with softly distended abdomen with discomfort to palpation  -adding miralax and colace  -will c/t monitor

## 2018-03-06 NOTE — PROGRESS NOTE ADULT - PROBLEM SELECTOR PLAN 2
CHEST PAIN: acute, likely 2/2 costochondritis but concern for arrythmia given hyperosmlar nonketotic state, will monitor glucose closely and trend troponins and CK  -EKG NSR no signs of acute ST changes  -Troponin <0.01

## 2018-03-06 NOTE — PROGRESS NOTE ADULT - PROBLEM SELECTOR PLAN 5
Stable, controlled on medication  -c/t quetiapine 100mg at 10am, 6pm and 300mg at 9pm every day   -c/t haloperidol decanoate 50mg/ml IM every 4 weeks on 4th monday of month at 3pm-11pm, next dose due on 3/26/18  -c/t prozac 20 QD at 10am for depression  -c/t lithium 450mg daily at 10am for bipolar disorder  -f/up am lithium level  -consult placed for psychiatry while inpatient, will f/up

## 2018-03-06 NOTE — PROGRESS NOTE ADULT - PROBLEM SELECTOR PLAN 4
pseudohyponatremia due to hyperglycemic state  -corrected sodium 132  -c/t monitor  -vitals per routine

## 2018-03-06 NOTE — PROGRESS NOTE ADULT - SUBJECTIVE AND OBJECTIVE BOX
Patient is a 67y old  Male who presents with a chief complaint of sent from [place] for AMS with hyperglycemia (05 Mar 2018 20:53)      INTERVAL HPI/OVERNIGHT EVENTS:  Mr. Daley is a 68yo M with PMH s/f dementia, DMII,  hyperlipidemia, biploar disorder (type I?), glaucoma who was sent from [facility] for health and rehab due to "hyperglycemia, alert with confusion". Patient is a poor historian, states that he was at home today and felt unwell, says "I have to go to the hospital now or I won't feel good, can you take me?". He states he currently has constipation, unable to recall LBM,  and feels that he urinates too frequently and wets the bed.      As per [place] facility (via phone), patient was discontinued off lantus many months ago (around July) for unknown reasons, was seen "guzzling sodas" yesterday. Patient inconsistently takes Januvia        Allergies    Cipro (Unknown)  penicillins (Unknown)    Intolerances        Vital Signs Last 24 Hrs  T(C): 36.3 (06 Mar 2018 04:04), Max: 37.1 (05 Mar 2018 14:30)  T(F): 97.4 (06 Mar 2018 04:04), Max: 98.7 (05 Mar 2018 14:30)  HR: 99 (06 Mar 2018 04:04) (79 - 101)  BP: 168/95 (06 Mar 2018 04:04) (118/86 - 168/95)  BP(mean): --  RR: 18 (06 Mar 2018 04:04) (16 - 18)  SpO2: 98% (06 Mar 2018 04:04) (90% - 98%)    Daily Height in cm: 177.8 (05 Mar 2018 14:30)    Daily   I&O's Detail                PHYSICAL EXAM:    Constitutional: NAD, soaked in urine in stretcher, malodorous  	HEENT: PERRLA, EOMI, Normal Hearing  	Neck: No JVD, supple  	Back: Normal spine flexure, No CVA tenderness  	Respiratory: CTABL no w/r/r  	Cardiovascular: S1 and S2, RRR, no m/r/g; +TTP diffusely across sternum;   	Gastrointestinal: BS+ normoactive, softly distended, +diffuse mild TTP across all quadrants, patient stating "I feel like I have to go to the bathroom", no suprapubic discomfort, no guarding/rigidity  	Extremities: No c/c/e  	Vascular: 2+ peripheral pulses  	Neurological: AAO x 1 (to self, not to place or year), no focal deficits; sensation intact in LE b/l  	Psychiatric: Pleasant mood, normal affect, mildly dysarthric speech  	Musculoskeletal: 5/5 strength b/l upper and lower extremities  Skin: +midline surgical scar extending horizontally across top of sternum , small 1 inch L sided scar on neck, both non-erythematous, no discharge, NTTP; skin between toes intact, yellowish toenails not crumbling, no signs of acute foot infection    LABS:                        15.8   12.9  )-----------( 189      ( 05 Mar 2018 15:04 )             45.2     CBC Full  -  ( 05 Mar 2018 15:04 )  WBC Count : 12.9 K/uL  Hemoglobin : 15.8 g/dL  Hematocrit : 45.2 %  Platelet Count - Automated : 189 K/uL  Mean Cell Volume : 80.3 fl  Mean Cell Hemoglobin : 28.1 pg  Mean Cell Hemoglobin Concentration : 34.1 g/dL  Auto Neutrophil # : 10.9 K/uL  Auto Lymphocyte # : 1.0 K/uL  Auto Monocyte # : 0.9 K/uL  Auto Eosinophil # : 0.0 K/uL  Auto Basophil # : 0.0 K/uL  Auto Neutrophil % : 84.6 %  Auto Lymphocyte % : 7.9 %  Auto Monocyte % : 6.9 %  Auto Eosinophil % : 0.1 %  Auto Basophil % : 0.1 %    03-05    132<L>  |  85<L>  |  35.0<H>  ----------------------------<  1092<HH>  4.8   |  28.0  |  1.47<H>    Ca    10.5<H>      05 Mar 2018 15:04    TPro  9.4<H>  /  Alb  4.5  /  TBili  0.7  /  DBili  x   /  AST  13  /  ALT  12  /  AlkPhos  135<H>  03-05      Urinalysis Basic - ( 05 Mar 2018 15:04 )    Color: Yellow / Appearance: Clear / S.005 / pH: x  Gluc: x / Ketone: Negative  / Bili: Negative / Urobili: Negative mg/dL   Blood: x / Protein: Negative mg/dL / Nitrite: Negative   Leuk Esterase: Negative / RBC: x / WBC x   Sq Epi: x / Non Sq Epi: x / Bacteria: x              Albumin, Serum: 4.5 g/dL ( @ 15:04)    LIVER FUNCTIONS - ( 05 Mar 2018 15:04 )  Alb: 4.5 g/dL / Pro: 9.4 g/dL / ALK PHOS: 135 U/L / ALT: 12 U/L / AST: 13 U/L / GGT: x             RADIOLOGY & ADDITIONAL TESTS: Patient is a 67y old  Male who presents with a chief complaint of sent from [place] for AMS with hyperglycemia (05 Mar 2018 20:53)      INTERVAL HPI  Mr. Daley is a 68yo M with PMH s/f dementia, DMII,  hyperlipidemia, biploar disorder (type I?), glaucoma who was sent from [facility] for health and rehab due to "hyperglycemia, alert with confusion". Patient is a poor historian, states that he was at home today and felt unwell, says "I have to go to the hospital now or I won't feel good, can you take me?". He states he currently has constipation, unable to recall LBM,  and feels that he urinates too frequently and wets the bed.      As per [place] facility (via phone), patient was discontinued off lantus many months ago (around July) for unknown reasons, was seen "guzzling sodas" yesterday. Patient inconsistently takes Januvia    OVERNIGHT EVENTS:    Patient seen and examined at bedside. Mr. Daley states he slept a bit overnight, voided once since I saw him last (in addition to voiding onto bed when I saw him), did not have a BM overnight. States he continues to have discomfort in his chest across the entire sternal region, about an 8/10 in severity, nonradiating. No jaw pain/LUE pain or paresthesias.   Denies dysuria, fever/chills, changes in vision, abdominal pain, headache, back pain, nausea/vomiting.         Allergies    Cipro (Unknown)  penicillins (Unknown)    Intolerances        Vital Signs Last 24 Hrs  T(C): 36.3 (06 Mar 2018 04:04), Max: 37.1 (05 Mar 2018 14:30)  T(F): 97.4 (06 Mar 2018 04:04), Max: 98.7 (05 Mar 2018 14:30)  HR: 99 (06 Mar 2018 04:04) (79 - 101)  BP: 168/95 (06 Mar 2018 04:04) (118/86 - 168/95)  BP(mean): --  RR: 18 (06 Mar 2018 04:04) (16 - 18)  SpO2: 98% (06 Mar 2018 04:04) (90% - 98%)    Daily Height in cm: 177.8 (05 Mar 2018 14:30)    Daily weight not recorded  I/O not recorded: appx 100cc dark yellow urine in bedside commode          PHYSICAL EXAM:    Constitutional: NAD, soaked in urine in stretcher, malodorous  	HEENT: PERRLA, EOMI, Normal Hearing  	Neck: No JVD, supple  	Back: Normal spine flexure, No CVA tenderness  	Respiratory: CTABL no w/r/r  	Cardiovascular: S1 and S2, RRR, no m/r/g; +TTP diffusely across sternum;   	Gastrointestinal: BS+ normoactive, softly distended, +diffuse mild TTP across all quadrants, patient stating "I feel like I have to go to the bathroom", no suprapubic discomfort, no guarding/rigidity  	Extremities: No c/c/e  	Vascular: 2+ peripheral pulses  	Neurological: AAO x 1 (to self, not to place or year), no focal deficits; sensation intact in LE b/l  	Psychiatric: Pleasant mood, normal affect, mildly dysarthric speech  	Musculoskeletal: 5/5 strength b/l upper and lower extremities  Skin: +midline surgical scar extending horizontally across top of sternum , small 1 inch L sided scar on neck, both non-erythematous, no discharge, NTTP; skin between toes intact, yellowish toenails not crumbling, no signs of acute foot infection    LABS:                        15.8   12.9  )-----------( 189      ( 05 Mar 2018 15:04 )             45.2     CBC Full  -  ( 05 Mar 2018 15:04 )  WBC Count : 12.9 K/uL  Hemoglobin : 15.8 g/dL  Hematocrit : 45.2 %  Platelet Count - Automated : 189 K/uL  Mean Cell Volume : 80.3 fl  Mean Cell Hemoglobin : 28.1 pg  Mean Cell Hemoglobin Concentration : 34.1 g/dL  Auto Neutrophil # : 10.9 K/uL  Auto Lymphocyte # : 1.0 K/uL  Auto Monocyte # : 0.9 K/uL  Auto Eosinophil # : 0.0 K/uL  Auto Basophil # : 0.0 K/uL  Auto Neutrophil % : 84.6 %  Auto Lymphocyte % : 7.9 %  Auto Monocyte % : 6.9 %  Auto Eosinophil % : 0.1 %  Auto Basophil % : 0.1 %    03-05    132<L>  |  85<L>  |  35.0<H>  ----------------------------<  1092<HH>  4.8   |  28.0  |  1.47<H>    Ca    10.5<H>      05 Mar 2018 15:04    TPro  9.4<H>  /  Alb  4.5  /  TBili  0.7  /  DBili  x   /  AST  13  /  ALT  12  /  AlkPhos  135<H>  03-05      Urinalysis Basic - ( 05 Mar 2018 15:04 )    Color: Yellow / Appearance: Clear / S.005 / pH: x  Gluc: x / Ketone: Negative  / Bili: Negative / Urobili: Negative mg/dL   Blood: x / Protein: Negative mg/dL / Nitrite: Negative   Leuk Esterase: Negative / RBC: x / WBC x   Sq Epi: x / Non Sq Epi: x / Bacteria: x              Albumin, Serum: 4.5 g/dL ( @ 15:04)    LIVER FUNCTIONS - ( 05 Mar 2018 15:04 )  Alb: 4.5 g/dL / Pro: 9.4 g/dL / ALK PHOS: 135 U/L / ALT: 12 U/L / AST: 13 U/L / GGT: x             RADIOLOGY & ADDITIONAL TESTS:  *patient refused CT abd ordered by ED    MEDICATIONS  (STANDING):  brimonidine 0.2% Ophthalmic Solution 1 Drop(s) Both EYES every 12 hours  dextrose 5%. 1000 milliLiter(s) (50 mL/Hr) IV Continuous <Continuous>  dextrose 50% Injectable 12.5 Gram(s) IV Push once  dextrose 50% Injectable 25 Gram(s) IV Push once  dextrose 50% Injectable 25 Gram(s) IV Push once  diVALproex ER 1000 milliGRAM(s) Oral daily  diVALproex  milliGRAM(s) Oral daily  docusate sodium 100 milliGRAM(s) Oral two times a day  enoxaparin Injectable 40 milliGRAM(s) SubCutaneous every 24 hours  FLUoxetine 20 milliGRAM(s) Oral daily  insulin glargine Injectable (LANTUS) 15 Unit(s) SubCutaneous at bedtime  insulin lispro (HumaLOG) corrective regimen sliding scale   SubCutaneous three times a day before meals  insulin lispro (HumaLOG) corrective regimen sliding scale   SubCutaneous at bedtime  insulin lispro Injectable (HumaLOG) 5 Unit(s) SubCutaneous before breakfast  insulin lispro Injectable (HumaLOG) 5 Unit(s) SubCutaneous before lunch  insulin lispro Injectable (HumaLOG) 5 Unit(s) SubCutaneous before dinner  isosorbide   mononitrate ER Tablet (IMDUR) 30 milliGRAM(s) Oral daily  latanoprost 0.005% Ophthalmic Solution 1 Drop(s) Both EYES at bedtime  lithium 450 milliGRAM(s) Oral daily  metoprolol     tartrate 50 milliGRAM(s) Oral every 12 hours  polyethylene glycol 3350 17 Gram(s) Oral daily  QUEtiapine 100 milliGRAM(s) Oral daily  QUEtiapine 100 milliGRAM(s) Oral daily  QUEtiapine 300 milliGRAM(s) Oral daily  simvastatin 20 milliGRAM(s) Oral at bedtime  sodium chloride 0.9%. 1000 milliLiter(s) (200 mL/Hr) IV Continuous <Continuous>    MEDICATIONS  (PRN):  acetaminophen   Tablet. 650 milliGRAM(s) Oral every 6 hours PRN Moderate Pain (4 - 6)  dextrose Gel 1 Dose(s) Oral once PRN Blood Glucose LESS THAN 70 milliGRAM(s)/deciliter  glucagon  Injectable 1 milliGRAM(s) IntraMuscular once PRN Glucose LESS THAN 70 milligrams/deciliter

## 2018-03-06 NOTE — PROGRESS NOTE ADULT - PROBLEM SELECTOR PLAN 1
Acute onset, likely 2/2 noncompliance with diet (reported to be drinking multiple sodas yesterday as per [facility]), with inconsistent use of Januvia   -Glucose was 1092 on admission, then 680, now from 405 to 306  -s/p insulin IV push 10 units x5 + 8units = 58 units plus lantus 10  -moderate sliding scale with 5 premeals  -lantus 15 QHS, will adjust as needed ( based on weight will need from 0.3x 90kg = 15 + 4 premeals  to 0.4x 90kg= 5-7 premeals with 15 lantus)  -NS @ 150cc/hr for one day  -diet: consistent carb, no snacks, DASH/TLC and lowfat  -AM CBC, BMP, magnesium, phosphorus  -no ketones in urine, gap 19

## 2018-03-07 ENCOUNTER — TRANSCRIPTION ENCOUNTER (OUTPATIENT)
Age: 68
End: 2018-03-07

## 2018-03-07 LAB
ANION GAP SERPL CALC-SCNC: 12 MMOL/L — SIGNIFICANT CHANGE UP (ref 5–17)
BUN SERPL-MCNC: 14 MG/DL — SIGNIFICANT CHANGE UP (ref 8–20)
CALCIUM SERPL-MCNC: 8.2 MG/DL — LOW (ref 8.6–10.2)
CHLORIDE SERPL-SCNC: 108 MMOL/L — HIGH (ref 98–107)
CO2 SERPL-SCNC: 28 MMOL/L — SIGNIFICANT CHANGE UP (ref 22–29)
CREAT SERPL-MCNC: 1.09 MG/DL — SIGNIFICANT CHANGE UP (ref 0.5–1.3)
EOSINOPHIL # BLD AUTO: 0.2 K/UL — SIGNIFICANT CHANGE UP (ref 0–0.5)
EOSINOPHIL NFR BLD AUTO: 2 % — SIGNIFICANT CHANGE UP (ref 0–5)
GLUCOSE BLDC GLUCOMTR-MCNC: 171 MG/DL — HIGH (ref 70–99)
GLUCOSE BLDC GLUCOMTR-MCNC: 259 MG/DL — HIGH (ref 70–99)
GLUCOSE BLDC GLUCOMTR-MCNC: 296 MG/DL — HIGH (ref 70–99)
GLUCOSE BLDC GLUCOMTR-MCNC: 325 MG/DL — HIGH (ref 70–99)
GLUCOSE BLDC GLUCOMTR-MCNC: 396 MG/DL — HIGH (ref 70–99)
GLUCOSE SERPL-MCNC: 205 MG/DL — HIGH (ref 70–115)
HCT VFR BLD CALC: 39.6 % — LOW (ref 42–52)
HGB BLD-MCNC: 12.9 G/DL — LOW (ref 14–18)
LYMPHOCYTES # BLD AUTO: 1.9 K/UL — SIGNIFICANT CHANGE UP (ref 1–4.8)
LYMPHOCYTES # BLD AUTO: 25.4 % — SIGNIFICANT CHANGE UP (ref 20–55)
MAGNESIUM SERPL-MCNC: 2.2 MG/DL — SIGNIFICANT CHANGE UP (ref 1.8–2.6)
MCHC RBC-ENTMCNC: 28 PG — SIGNIFICANT CHANGE UP (ref 27–31)
MCHC RBC-ENTMCNC: 32.6 G/DL — SIGNIFICANT CHANGE UP (ref 32–36)
MCV RBC AUTO: 86.1 FL — SIGNIFICANT CHANGE UP (ref 80–94)
MONOCYTES # BLD AUTO: 0.5 K/UL — SIGNIFICANT CHANGE UP (ref 0–0.8)
MONOCYTES NFR BLD AUTO: 7.2 % — SIGNIFICANT CHANGE UP (ref 3–10)
NEUTROPHILS # BLD AUTO: 4.8 K/UL — SIGNIFICANT CHANGE UP (ref 1.8–8)
NEUTROPHILS NFR BLD AUTO: 65.1 % — SIGNIFICANT CHANGE UP (ref 37–73)
PLATELET # BLD AUTO: 95 K/UL — LOW (ref 150–400)
POTASSIUM SERPL-MCNC: 4.4 MMOL/L — SIGNIFICANT CHANGE UP (ref 3.5–5.3)
POTASSIUM SERPL-SCNC: 4.4 MMOL/L — SIGNIFICANT CHANGE UP (ref 3.5–5.3)
RBC # BLD: 4.6 M/UL — SIGNIFICANT CHANGE UP (ref 4.6–6.2)
RBC # FLD: 13.8 % — SIGNIFICANT CHANGE UP (ref 11–15.6)
SODIUM SERPL-SCNC: 148 MMOL/L — HIGH (ref 135–145)
WBC # BLD: 7.3 K/UL — SIGNIFICANT CHANGE UP (ref 4.8–10.8)
WBC # FLD AUTO: 7.3 K/UL — SIGNIFICANT CHANGE UP (ref 4.8–10.8)

## 2018-03-07 PROCEDURE — 99233 SBSQ HOSP IP/OBS HIGH 50: CPT | Mod: GC

## 2018-03-07 PROCEDURE — 99223 1ST HOSP IP/OBS HIGH 75: CPT

## 2018-03-07 PROCEDURE — 93306 TTE W/DOPPLER COMPLETE: CPT | Mod: 26

## 2018-03-07 RX ORDER — SODIUM CHLORIDE 9 MG/ML
1000 INJECTION, SOLUTION INTRAVENOUS
Qty: 0 | Refills: 0 | Status: COMPLETED | OUTPATIENT
Start: 2018-03-07 | End: 2018-03-07

## 2018-03-07 RX ORDER — INSULIN LISPRO 100/ML
15 VIAL (ML) SUBCUTANEOUS
Qty: 0 | Refills: 0 | Status: DISCONTINUED | OUTPATIENT
Start: 2018-03-07 | End: 2018-03-08

## 2018-03-07 RX ORDER — NYSTATIN CREAM 100000 [USP'U]/G
1 CREAM TOPICAL
Qty: 0 | Refills: 0 | Status: DISCONTINUED | OUTPATIENT
Start: 2018-03-07 | End: 2018-03-09

## 2018-03-07 RX ORDER — INSULIN GLARGINE 100 [IU]/ML
30 INJECTION, SOLUTION SUBCUTANEOUS AT BEDTIME
Qty: 0 | Refills: 0 | Status: DISCONTINUED | OUTPATIENT
Start: 2018-03-07 | End: 2018-03-08

## 2018-03-07 RX ORDER — ISOSORBIDE MONONITRATE 60 MG/1
60 TABLET, EXTENDED RELEASE ORAL DAILY
Qty: 0 | Refills: 0 | Status: DISCONTINUED | OUTPATIENT
Start: 2018-03-07 | End: 2018-03-09

## 2018-03-07 RX ADMIN — Medication 100 MILLIGRAM(S): at 17:56

## 2018-03-07 RX ADMIN — QUETIAPINE FUMARATE 100 MILLIGRAM(S): 200 TABLET, FILM COATED ORAL at 16:11

## 2018-03-07 RX ADMIN — BRIMONIDINE TARTRATE 1 DROP(S): 2 SOLUTION/ DROPS OPHTHALMIC at 06:10

## 2018-03-07 RX ADMIN — Medication 8: at 12:15

## 2018-03-07 RX ADMIN — LITHIUM CARBONATE 450 MILLIGRAM(S): 300 TABLET, EXTENDED RELEASE ORAL at 18:11

## 2018-03-07 RX ADMIN — SODIUM CHLORIDE 100 MILLILITER(S): 9 INJECTION, SOLUTION INTRAVENOUS at 18:12

## 2018-03-07 RX ADMIN — Medication 10 UNIT(S): at 08:57

## 2018-03-07 RX ADMIN — Medication 5 MILLIGRAM(S): at 22:36

## 2018-03-07 RX ADMIN — DIVALPROEX SODIUM 500 MILLIGRAM(S): 500 TABLET, DELAYED RELEASE ORAL at 16:11

## 2018-03-07 RX ADMIN — POLYETHYLENE GLYCOL 3350 17 GRAM(S): 17 POWDER, FOR SOLUTION ORAL at 12:17

## 2018-03-07 RX ADMIN — Medication 100 MILLIGRAM(S): at 06:10

## 2018-03-07 RX ADMIN — LATANOPROST 1 DROP(S): 0.05 SOLUTION/ DROPS OPHTHALMIC; TOPICAL at 22:37

## 2018-03-07 RX ADMIN — Medication 50 MILLIGRAM(S): at 11:15

## 2018-03-07 RX ADMIN — Medication 2: at 00:20

## 2018-03-07 RX ADMIN — DIVALPROEX SODIUM 1000 MILLIGRAM(S): 500 TABLET, DELAYED RELEASE ORAL at 12:20

## 2018-03-07 RX ADMIN — HEPARIN SODIUM 5000 UNIT(S): 5000 INJECTION INTRAVENOUS; SUBCUTANEOUS at 06:10

## 2018-03-07 RX ADMIN — Medication 15 UNIT(S): at 17:55

## 2018-03-07 RX ADMIN — HEPARIN SODIUM 5000 UNIT(S): 5000 INJECTION INTRAVENOUS; SUBCUTANEOUS at 17:57

## 2018-03-07 RX ADMIN — Medication 10 UNIT(S): at 12:15

## 2018-03-07 RX ADMIN — INSULIN GLARGINE 30 UNIT(S): 100 INJECTION, SOLUTION SUBCUTANEOUS at 23:42

## 2018-03-07 RX ADMIN — BRIMONIDINE TARTRATE 1 DROP(S): 2 SOLUTION/ DROPS OPHTHALMIC at 22:38

## 2018-03-07 RX ADMIN — Medication 6: at 23:21

## 2018-03-07 RX ADMIN — Medication 10: at 17:55

## 2018-03-07 RX ADMIN — Medication 20 MILLIGRAM(S): at 12:18

## 2018-03-07 RX ADMIN — Medication 50 MILLIGRAM(S): at 22:36

## 2018-03-07 RX ADMIN — Medication 6: at 08:33

## 2018-03-07 RX ADMIN — SIMVASTATIN 20 MILLIGRAM(S): 20 TABLET, FILM COATED ORAL at 22:36

## 2018-03-07 NOTE — PROGRESS NOTE ADULT - ASSESSMENT
68yo M with PMH s/f dementia, DMII,  hyperlipidemia, biploar disorder (type I?), glaucoma who was sent from [facility] for health and rehab due to "hyperglycemia, alert with confusion", found to be in nonketotic hyperosmolar hyperglycemic state (HHS), clinically improving, now glucose down to 205    .

## 2018-03-07 NOTE — BEHAVIORAL HEALTH ASSESSMENT NOTE - NSBHCHARTREVIEWLAB_PSY_A_CORE FT
12.9   7.3   )-----------( 95       ( 07 Mar 2018 05:45 )             39.6     03-07    148<H>  |  108<H>  |  14.0  ----------------------------<  205<H>  4.4   |  28.0  |  1.09    Ca    8.2<L>      07 Mar 2018 05:45  Phos  2.1     03-06  Mg     2.2     03-07    TPro  8.3  /  Alb  3.7  /  TBili  0.5  /  DBili  x   /  AST  16  /  ALT  10  /  AlkPhos  96  03-06    LIVER FUNCTIONS - ( 06 Mar 2018 08:24 )  Alb: 3.7 g/dL / Pro: 8.3 g/dL / ALK PHOS: 96 U/L / ALT: 10 U/L / AST: 16 U/L / GGT: x

## 2018-03-07 NOTE — PROGRESS NOTE ADULT - PROBLEM SELECTOR PLAN 1
Acute onset, likely 2/2 noncompliance with diet (reported to be drinking multiple sodas yesterday as per [facility]), with inconsistent use of Januvia   -Glucose was 1092 on admission, then 680, now from 405 to 306 to 205  -s/p insulin IV push 10 units x5 + 8units = 36 units + lantus 26  10 units premeals + sliding scale + Lantus 26 units HS  -NS @ 150cc/hr for one day  -diet: consistent carb, no snacks, DASH/TLC and lowfat  -AM CBC, BMP, magnesium, phosphorus  -no ketones in urine, gap 19

## 2018-03-07 NOTE — PHYSICAL THERAPY INITIAL EVALUATION ADULT - CRITERIA FOR SKILLED THERAPEUTIC INTERVENTIONS
anticipated discharge recommendation/rehab potential/predicted duration of therapy intervention/therapy frequency/functional limitations in following categories/impairments found

## 2018-03-07 NOTE — BEHAVIORAL HEALTH ASSESSMENT NOTE - NSBHCHARTREVIEWVS_PSY_A_CORE FT
Vital Signs Last 24 Hrs  T(C): 36.6 (07 Mar 2018 11:30), Max: 37.1 (07 Mar 2018 02:18)  T(F): 97.9 (07 Mar 2018 11:30), Max: 98.7 (07 Mar 2018 02:18)  HR: 102 (07 Mar 2018 11:30) (76 - 102)  BP: 148/82 (07 Mar 2018 11:30) (119/73 - 155/86)  BP(mean): --  RR: 18 (07 Mar 2018 11:30) (18 - 20)  SpO2: 95% (07 Mar 2018 11:30) (95% - 98%)

## 2018-03-07 NOTE — BEHAVIORAL HEALTH ASSESSMENT NOTE - OTHER PAST PSYCHIATRIC HISTORY (INCLUDE DETAILS REGARDING ONSET, COURSE OF ILLNESS, INPATIENT/OUTPATIENT TREATMENT)
Reports two prior psychiatric hospitalizations because he has a "touch of bipolar". States in the past he owned a gun and had suicidal ideation but never had went through with it.

## 2018-03-07 NOTE — DISCHARGE NOTE ADULT - PROVIDER TOKENS
FREE:[LAST:[[facility]],PHONE:[phone]],FAX:[(   [phone]    -],ADDRESS:[[address] [place] [address]]]

## 2018-03-07 NOTE — DISCHARGE NOTE ADULT - CARE PLAN
Principal Discharge DX:	Hyperosmolar non-ketotic state in patient with type 2 diabetes mellitus  Goal:	Resolution  Assessment and plan of treatment:	Eat a DASH/TLC diet with consistent carbs and start using insulin as prescribed. Follow up with your PMD within one week  Secondary Diagnosis:	Bipolar 1 disorder  Goal:	Control  Assessment and plan of treatment:	Continue with your home medications as prescribed  Follow up with your psychiatrist within 2 weeks  Secondary Diagnosis:	Intercostal pain  Goal:	Resolution  Assessment and plan of treatment:	Chest pain is reproducible physician pressing on chest. Echo is negative, EKG is negative. Continue with medications as prescribed and follow up with your PMD within one week  Secondary Diagnosis:	Constipation  Goal:	Resolution  Assessment and plan of treatment:	Take a bowel regimen of colace, senna and miralax as prescribed. Follow up with your PMD within one week  Secondary Diagnosis:	Glaucoma  Goal:	Control  Assessment and plan of treatment:	Continue with your home medications as prescribed. Follow up with your PMD within one week  Secondary Diagnosis:	Essential hypertension  Goal:	BP < 140/90  Assessment and plan of treatment:	Take all medications as prescribed and follow up with your PMD within one week  Secondary Diagnosis:	Hyperlipidemia  Goal:	Control  Assessment and plan of treatment:	Take all medications as prescribed and follow up with your PMD within one week Principal Discharge DX:	Hyperosmolar non-ketotic state in patient with type 2 diabetes mellitus  Goal:	Resolution  Assessment and plan of treatment:	Eat a DASH/TLC diet with consistent carbs and start using insulin as prescribed. Follow up with your PMD within one week  Secondary Diagnosis:	Bipolar 1 disorder  Goal:	Control  Assessment and plan of treatment:	Continue with your home medications as prescribed  Follow up with your psychiatrist within 2 weeks  Secondary Diagnosis:	Intercostal pain  Goal:	Resolution  Assessment and plan of treatment:	Chest pain is reproducible physician pressing on chest. Echo is negative, EKG is negative. Continue with medications as prescribed and follow up with your PMD within one week  Secondary Diagnosis:	Constipation  Goal:	Resolution  Assessment and plan of treatment:	Take a bowel regimen of colace, senna. Follow up with your PMD within one week  Secondary Diagnosis:	Glaucoma  Goal:	Control  Assessment and plan of treatment:	Continue with your home medications as prescribed. Follow up with your PMD within one week  Secondary Diagnosis:	Essential hypertension  Goal:	BP < 140/90  Assessment and plan of treatment:	Take all medications as prescribed and follow up with your PMD within one week  Secondary Diagnosis:	Hyperlipidemia  Goal:	Control  Assessment and plan of treatment:	Take all medications as prescribed and follow up with your PMD within one week

## 2018-03-07 NOTE — DISCHARGE NOTE ADULT - HOSPITAL COURSE
· Assessment		  68yo M with PMH dementia, DMII, hyperlipidemia, bipolar disorder, glaucoma who was sent from [facility] for Health and Rehab due to "hyperglycemia, alert with confusion", found to be in nonketotic hyperosmolar hyperglycemic state (HHS), clinically improved, now glucose down to 205    ·  Problem: Hyperosmolar non-ketotic state in patient with type 2 diabetes mellitus.  Plan: Acute onset, likely secondary to noncompliance with diet with inconsistent use of Januvia   -Glucose was 1092 on admission and trended down throughout stay on therapy of IV insulin: lantus increased to 30, and 15 units premeals w/sliding scale. Pt will need insulin at outpt  -diet: consistent carb, no snacks, DASH/TLC and lowfat    ·  Problem: Chest pain.  Plan: CHEST PAIN: acute, likely secondary to  costochondritis. Troponins (-), EKG NSR no signs of acute ST changes, TTE EF 60-65%, Diastolic Stage 1 (appropriate for age)  ·  Problem: Seizure disorder.  Plan: Controlled, stable  - treated with outpatient regimen of depakote (500mg ER at 10am, 1000mg ER at 6pm)    ·  Problem: Hyponatremia.  Plan: pseudohyponatremia due to hyperglycemic state Resolved    ·  Problem: Bipolar 1 disorder.  Plan: Stable, controlled on medication  -quetiapine 100mg at 10am, 6pm and 300mg at 9pm every day   -treated with home haloperidol decanoate 50mg/ml IM every 4 weeks on 4th monday of month at 3pm-11pm, next dose due on 3/26/18  -prozac 20 QD at 10am for depression  -lithium 450mg daily at 10am for bipolar disorder  -psychiatry cleared pt for discharge when medically stable and recommened that no changes are made to home routine    Problem: Hypertension. Plan: Controlled outpatient  -treated with home metoprolol and isosorbide mononitrate     ·  Problem: Hyperlipidemia.  Plan: Controlled outpatient  -treated w/home simvastatin     ·  Problem: Glaucoma.  Plan: Controlled  -treated w/brimonidine 0.2% eye drops and latanoprost 0.005%   ·  Problem: Constipation.  Plan: Patient stating LBM unknown but "many days ago", with softly distended abdomen with discomfort to palpation  -treated with miralax and colace 66yo M with PMH dementia, DMII, hyperlipidemia, bipolar disorder, glaucoma who was sent from [facility] due to "hyperglycemia, alert with confusion", found to be in nonketotic hyperosmolar hyperglycemic state (HHS), clinically improved with blood glucose under control. Patient now placed on insulin regimen. Patient to follow up with PMD for continued management of DM.    Time coordinating discharge: 45 minutes

## 2018-03-07 NOTE — PHYSICAL THERAPY INITIAL EVALUATION ADULT - GAIT PATTERN USED, PT EVAL
contact assist for turns for safety due to mild unsteadiness, variable gait velocities, verbal cues to increase environmental scanning

## 2018-03-07 NOTE — PROGRESS NOTE ADULT - PROBLEM SELECTOR PLAN 5
Stable, controlled on medication  -c/t quetiapine 100mg at 10am, 6pm and 300mg at 9pm every day   -c/t haloperidol decanoate 50mg/ml IM every 4 weeks on 4th monday of month at 3pm-11pm, next dose due on 3/26/18  -c/t prozac 20 QD at 10am for depression  -c/t lithium 450mg daily at 10am for bipolar disorder  -f/up am lithium level 0.30 yesterday  -consult placed for psychiatry while inpatient, will f/up - no note, yet.

## 2018-03-07 NOTE — DISCHARGE NOTE ADULT - PATIENT PORTAL LINK FT
You can access the [facility] Patient Portal, offered by [facility], by registering with the following website: [email or web address]

## 2018-03-07 NOTE — PHYSICAL THERAPY INITIAL EVALUATION ADULT - ADDITIONAL COMMENTS
pt a questionable historian: states he lives at [facility], does not use a RW, see social work for details

## 2018-03-07 NOTE — DISCHARGE NOTE ADULT - NS AS ACTIVITY OBS
Do not make important decisions/Bathing allowed/Showering allowed/Walking-Indoors allowed/Walking-Outdoors allowed

## 2018-03-07 NOTE — PROGRESS NOTE ADULT - PROBLEM SELECTOR PLAN 9
Patient stating LBM unknown but "many days ago", with softly distended abdomen with discomfort to palpation  -C/W miralax and colace  -will c/t monitor

## 2018-03-07 NOTE — BEHAVIORAL HEALTH ASSESSMENT NOTE - SUMMARY
Patient is a 67 year old, male; domiciled at [facility];  ; with one adult daughter who lives in [place]; with diagnosis of bipolar disorder ; reported two prior psychiatric  hospitalizations; no known suicide attempts; no known history of violence or arrests; no active substance abuse or known history of complicated withdrawal; PMH of DM, HTN, Dementia, Glaucoma, HLD, presented confused, with hyperglycemia after stopping insulin and taking excessive soda.  Currently stabilized.  Asked to evaluate psychiatric stability and medication regimen.  Patient sensorium has returned to baseline. No acute psychiatric symptoms were identified.  Patient denies any S/H I/I/P.  Condition was discussed with staff from home.  Patient has been psychiatrically stable for many years on this current regimen.  Would not make any changes at this time.  Would send back to home when medically cleared

## 2018-03-07 NOTE — DISCHARGE NOTE ADULT - PLAN OF CARE
Resolution Eat a DASH/TLC diet with consistent carbs and start using insulin as prescribed. Follow up with your PMD within one week Control Continue with your home medications as prescribed  Follow up with your psychiatrist within 2 weeks Chest pain is reproducible physician pressing on chest. Echo is negative, EKG is negative. Continue with medications as prescribed and follow up with your PMD within one week Take a bowel regimen of colace, senna and miralax as prescribed. Follow up with your PMD within one week Continue with your home medications as prescribed. Follow up with your PMD within one week BP < 140/90 Take all medications as prescribed and follow up with your PMD within one week Take a bowel regimen of colace, senna. Follow up with your PMD within one week

## 2018-03-07 NOTE — DISCHARGE NOTE ADULT - ABILITY TO HEAR (WITH HEARING AID OR HEARING APPLIANCE IF NORMALLY USED):
right side/Mildly to Moderately Impaired: difficulty hearing in some environments or speaker may need to increase volume or speak distinctly

## 2018-03-07 NOTE — BEHAVIORAL HEALTH ASSESSMENT NOTE - RISK ASSESSMENT
Low-Pt denies consistently depressed mood, denies any S/H I/I/P, no prior attempts, has good support, remains future oriented.

## 2018-03-07 NOTE — DISCHARGE NOTE ADULT - CARE PROVIDER_API CALL
[facility],   [address] [place] [address]  Phone: [phone]  Fax: (       -

## 2018-03-07 NOTE — DISCHARGE NOTE ADULT - MEDICATION SUMMARY - MEDICATIONS TO TAKE
I will START or STAY ON the medications listed below when I get home from the hospital:    isosorbide mononitrate 60 mg oral tablet, extended release  -- 1 tab(s) by mouth once a day  -- Indication: For Hypertension    divalproex sodium 500 mg oral tablet, extended release  -- 2 tab(s) by mouth once a day (at bedtime) at 6pm  -- Indication: For Bipolar 1 disorder    divalproex sodium 500 mg oral tablet, extended release  -- 1 tab(s) by mouth once a day at 10am  -- Indication: For Bipolar 1 disorder    PROzac 20 mg oral capsule  -- 1 cap(s) by mouth once a day in am  -- Indication: For Depression    Januvia 100 mg oral tablet  -- 1 tab(s) by mouth once a day  -- Indication: For Diabetes    insulin glargine  -- 38 unit(s) subcutaneous once a day (at bedtime)  -- Indication: For Diabetes    HumaLOG 100 units/mL injectable solution  -- 19 milliliter(s) injectable 3 times a day (before meals)   -- Indication: For Diabetes    Zocor 20 mg oral tablet  -- 1 tab(s) by mouth once a day (at bedtime)  -- Indication: For Hyperlipidemia    lithium carbonate  -- 450 milligram(s)  once a day at 10am  -- Indication: For Bipolar 1 disorder    SEROquel 100 mg oral tablet  --  by mouth once a day (at bedtime)  -- Indication: For Bipolar 1 disorder    Haldol Decanoate 50 mg/mL intramuscular solution  -- 1 milliliter(s) intramuscular every 4 weeks, every 4th monday 3pm-11pm  Last Dose 03/26/2018  -- Indication: For Bipolar 1 disorder    metoprolol tartrate 50 mg oral tablet  -- 1 tab(s) by mouth 2 times a day  -- Indication: For Hypertension    senna oral tablet  -- 2 tab(s) by mouth once a day (at bedtime)  -- Indication: For Constipation    docusate sodium 100 mg oral capsule  -- 1 cap(s) by mouth 2 times a day  -- Indication: For Constipation    latanoprost 0.005% ophthalmic solution  -- 1 drop(s) to each affected eye once a day (in the evening)  -- Indication: For Eye drops    brimonidine 0.2% ophthalmic solution  -- 1 drop(s) to each affected eye 2 times a day  -- Indication: For Eye drops

## 2018-03-07 NOTE — DISCHARGE NOTE ADULT - OTHER SIGNIFICANT FINDINGS
CBC Full  -  ( 07 Mar 2018 05:45 )  WBC Count : 7.3 K/uL  Hemoglobin : 12.9 g/dL  Hematocrit : 39.6 %  Platelet Count - Automated : 95 K/uL  Mean Cell Volume : 86.1 fl  Mean Cell Hemoglobin : 28.0 pg  Mean Cell Hemoglobin Concentration : 32.6 g/dL  Auto Neutrophil # : 4.8 K/uL  Auto Lymphocyte # : 1.9 K/uL  Auto Monocyte # : 0.5 K/uL  Auto Eosinophil # : 0.2 K/uL  Auto Basophil # : x  Auto Neutrophil % : 65.1 %  Auto Lymphocyte % : 25.4 %  Auto Monocyte % : 7.2 %  Auto Eosinophil % : 2.0 %  Auto Basophil % : x    03-07    148<H>  |  108<H>  |  14.0  ----------------------------<  205<H>  4.4   |  28.0  |  1.09    Ca    8.2<L>      07 Mar 2018 05:45  Phos  2.1     03-06  Mg     2.2     03-07    TPro  8.3  /  Alb  3.7  /  TBili  0.5  /  DBili  x   /  AST  16  /  ALT  10  /  AlkPhos  96  03-06    < from: TTE Echo Complete w/Doppler (03.07.18 @ 13:13) >  Summary:   1.Technically good study.   2. Endocardial visualization was enhanced with intravenous echo contrast.   3. Normal global left ventricular systolic function.   4. Left ventricular ejection fraction, by visual estimation, is 65 to   70%.   5. Spectral Doppler shows impaired relaxation pattern of left   ventricular myocardial filling (Grade I diastolic dysfunction).   6. Normal right ventricular size and function.   7. Sclerotic aortic valve with normal opening.   8. Trace mitral valve regurgitation.   9. There is no evidence of pericardial effusion.  < end of copied text >

## 2018-03-07 NOTE — PROGRESS NOTE ADULT - PROBLEM SELECTOR PLAN 3
Controlled, stable  -c/t outpatient regimen of depakote (500mg ER at 10am, 1000mg ER at 6pm)  -f/up am depakote level 23.6 yesterday

## 2018-03-07 NOTE — BEHAVIORAL HEALTH ASSESSMENT NOTE - NSBHCHARTREVIEWIMAGING_PSY_A_CORE FT
< from: CT Head No Cont (03.05.18 @ 16:06) >     EXAM:  CT BRAIN                          PROCEDURE DATE:  03/05/2018        < end of copied text >    < from: CT Head No Cont (03.05.18 @ 16:06) >      Impression: Diffuse atrophy and microvascular disease consistent with   age. No significant change since 2014.     < end of copied text >

## 2018-03-07 NOTE — PROGRESS NOTE ADULT - SUBJECTIVE AND OBJECTIVE BOX
Patient is a 67y old  Male who presents with a chief complaint of sent from [place] for AMS with hyperglycemia (05 Mar 2018 20:53)    INTERVAL HPI  Mr. Daley is a 66yo M with PMH s/f dementia, DMII,  hyperlipidemia, biploar disorder (type I?), glaucoma who was sent from [facility] for health and rehab due to "hyperglycemia, alert with confusion". Patient feels he has to have a bowel movement soon.     As per [place] facility (via phone), patient was discontinued off lantus many months ago (around July) for unknown reasons, was seen "guzzling sodas" day before admission. Patient inconsistently takes Januvia    OVERNIGHT EVENTS:  66yo Male seen at bedside and chart reviewed. Pt has no complaints overnight. No bowel movement yesterday. given colace this morning. Currently denies chest pain. it is intermittent and 8/10 when he feels it.  Pt denies SOB, cough, palpitations, Headache, lightheadedness, dizziness, abdominal pain, nausea, vomiting, diarrhea, constipation, dysuria, lower extremity edema    MONITOR: NSR 90s to 101.    Allergies    Cipro (Unknown)  penicillins (Unknown)    Intolerances    Vital Signs Last 24 Hrs  T(C): 36.9 (07 Mar 2018 06:18), Max: 37.1 (07 Mar 2018 02:18)  T(F): 98.5 (07 Mar 2018 06:18), Max: 98.7 (07 Mar 2018 02:18)  HR: 97 (07 Mar 2018 06:18) (76 - 106)  BP: 155/86 (07 Mar 2018 06:18) (119/73 - 176/99)  RR: 20 (07 Mar 2018 06:18) (16 - 20)  SpO2: 95% (07 Mar 2018 02:18) (95% - 98%)    PHYSICAL EXAM:  GENERAL: NAD, well-groomed, well-developed  HEAD:  Atraumatic, Normocephalic  EYES: EOMI, PERRLA, conjunctiva and sclera clear  ENMT: No tonsillar erythema, exudates, or enlargement; Moist mucous membranes  NECK: Supple, No JVD, Normal thyroid  NERVOUS SYSTEM:  Alert & Oriented X1 to person, Sensation intact in lower extremities  CHEST/LUNG: Clear to auscultation bilaterally; No rales, rhonchi, wheezing, or rubs  HEART: Regular rate; No murmurs, rubs, or gallops, B/L 1+ edema  ABDOMEN: Soft, Tenderness LUQ and LLQ, Nondistended; Bowel sounds present  EXTREMITIES:  2+ Peripheral Pulses, No clubbing, cyanosis, or edema  LYMPH: No lymphadenopathy noted  PSYCH: Normal mood and affect  SKIN: No rashes or lesions    LABS:                        12.9   7.3   )-----------( 95       ( 07 Mar 2018 05:45 )             39.6     CBC Full  -  ( 07 Mar 2018 05:45 )  WBC Count : 7.3 K/uL  Hemoglobin : 12.9 g/dL  Hematocrit : 39.6 %  Platelet Count - Automated : 95 K/uL  Mean Cell Volume : 86.1 fl  Mean Cell Hemoglobin : 28.0 pg  Mean Cell Hemoglobin Concentration : 32.6 g/dL  Auto Neutrophil # : 4.8 K/uL  Auto Lymphocyte # : 1.9 K/uL  Auto Monocyte # : 0.5 K/uL  Auto Eosinophil # : 0.2 K/uL  Auto Basophil # : x  Auto Neutrophil % : 65.1 %  Auto Lymphocyte % : 25.4 %  Auto Monocyte % : 7.2 %  Auto Eosinophil % : 2.0 %  Auto Basophil % : x        148<H>  |  108<H>  |  14.0  ----------------------------<  205<H>  4.4   |  28.0  |  1.09    Ca    8.2<L>      07 Mar 2018 05:45  Phos  2.1       Mg     2.2         TPro  8.3  /  Alb  3.7  /  TBili  0.5  /  DBili  x   /  AST  16  /  ALT  10  /  AlkPhos  96  -    Urinalysis Basic - ( 05 Mar 2018 15:04 )  Color: Yellow / Appearance: Clear / S.005 / pH: x  Gluc: x / Ketone: Negative  / Bili: Negative / Urobili: Negative mg/dL   Blood: x / Protein: Negative mg/dL / Nitrite: Negative   Leuk Esterase: Negative / RBC: x / WBC x   Sq Epi: x / Non Sq Epi: x / Bacteria: x    Hemoglobin A1C, Whole Blood: 11.8 % ( @ 08:24)  Albumin, Serum: 3.7 g/dL ( @ 08:24)    CARDIOVASCULAR:  hydrALAZINE Injectable 10 milliGRAM(s) IV Push once PRN  isosorbide   mononitrate ER Tablet (IMDUR) 30 milliGRAM(s) Oral daily  metoprolol     tartrate 50 milliGRAM(s) Oral every 12 hours  nitroglycerin     SubLingual 0.4 milliGRAM(s) SubLingual every 5 minutes PRN    NEUROLOGIC:  acetaminophen   Tablet. 650 milliGRAM(s) Oral every 6 hours PRN  diVALproex ER 1000 milliGRAM(s) Oral daily  diVALproex  milliGRAM(s) Oral daily  FLUoxetine 20 milliGRAM(s) Oral daily  lithium 450 milliGRAM(s) Oral daily  QUEtiapine 100 milliGRAM(s) Oral daily  QUEtiapine 100 milliGRAM(s) Oral daily  QUEtiapine 300 milliGRAM(s) Oral daily    HEMATOLOGIC:  heparin  Injectable 5000 Unit(s) SubCutaneous every 12 hours    GATROINTESTINAL:  bisacodyl 5 milliGRAM(s) Oral at bedtime  docusate sodium 100 milliGRAM(s) Oral two times a day  polyethylene glycol 3350 17 Gram(s) Oral daily    ENDO/METABOLIC:  dextrose 50% Injectable 12.5 Gram(s) IV Push once  dextrose 50% Injectable 25 Gram(s) IV Push once  dextrose 50% Injectable 25 Gram(s) IV Push once  dextrose Gel 1 Dose(s) Oral once PRN  glucagon  Injectable 1 milliGRAM(s) IntraMuscular once PRN  insulin glargine Injectable (LANTUS) 26 Unit(s) SubCutaneous at bedtime  insulin lispro (HumaLOG) corrective regimen sliding scale   SubCutaneous Before meals and at bedtime  insulin lispro Injectable (HumaLOG) 10 Unit(s) SubCutaneous three times a day before meals  simvastatin 20 milliGRAM(s) Oral at bedtime    IV FLUID/NUTRITION:  dextrose 5%. 1000 milliLiter(s) IV Continuous <Continuous>  sodium chloride 0.9%. 1000 milliLiter(s) IV Continuous <Continuous>    TOPICAL:  brimonidine 0.2% Ophthalmic Solution 1 Drop(s) Both EYES every 12 hours  latanoprost 0.005% Ophthalmic Solution 1 Drop(s) Both EYES at bedtime      RADIOLOGY & ADDITIONAL TESTS:

## 2018-03-07 NOTE — DISCHARGE NOTE ADULT - INSTRUCTIONS
Follow the DASH Diet: Dietary Approaches to Stop Hypertension) is a dietary pattern promoted by the [place].-based National Heart, Lung, and [employer] [part of the National Institutes of Health ("NIH"), an agency of the United States Department of Health and Human Services] to prevent and control hypertension. The DASH diet is rich in fruits, vegetables, whole grains, and low-fat dairy foods; includes meat, fish, poultry, nuts, and beans; and is limited in sugar-sweetened foods and beverages, red meat, and added fats. In addition to its effect on blood pressure, it is designed to be a well-balanced approach to eating for the general public. DASH is recommended by the United [place] Department of Agriculture ("USDA") as one of its ideal eating plans for all Americans.   Healthy carbohydrates. During digestion, sugars (simple carbohydrates) and starches (complex carbohydrates) break down into blood glucose. Focus on the healthiest carbohydrates, such as fruits, vegetables, whole grains, legumes (beans, peas and lentils) and low-fat dairy products.  •Fiber-rich foods. Dietary fiber includes all parts of plant foods that your body can't digest or absorb. Fiber moderates how your body digests and helps control blood sugar levels. Foods high in fiber include vegetables, fruits, nuts, legumes (beans, peas and lentils), whole-wheat flour and wheat bran.

## 2018-03-07 NOTE — BEHAVIORAL HEALTH ASSESSMENT NOTE - DETAILS
Reports that he has h/o suicidal ideation in the past but no prior attempts. Mother with h/o anxiety Mother with cardiac disease

## 2018-03-08 LAB
ALBUMIN SERPL ELPH-MCNC: 3.5 G/DL — SIGNIFICANT CHANGE UP (ref 3.3–5.2)
ALP SERPL-CCNC: 96 U/L — SIGNIFICANT CHANGE UP (ref 40–120)
ALT FLD-CCNC: 13 U/L — SIGNIFICANT CHANGE UP
ANION GAP SERPL CALC-SCNC: 11 MMOL/L — SIGNIFICANT CHANGE UP (ref 5–17)
AST SERPL-CCNC: 25 U/L — SIGNIFICANT CHANGE UP
BILIRUB SERPL-MCNC: 0.5 MG/DL — SIGNIFICANT CHANGE UP (ref 0.4–2)
BUN SERPL-MCNC: 11 MG/DL — SIGNIFICANT CHANGE UP (ref 8–20)
CALCIUM SERPL-MCNC: 9 MG/DL — SIGNIFICANT CHANGE UP (ref 8.6–10.2)
CHLORIDE SERPL-SCNC: 111 MMOL/L — HIGH (ref 98–107)
CO2 SERPL-SCNC: 26 MMOL/L — SIGNIFICANT CHANGE UP (ref 22–29)
CREAT SERPL-MCNC: 0.97 MG/DL — SIGNIFICANT CHANGE UP (ref 0.5–1.3)
GLUCOSE BLDC GLUCOMTR-MCNC: 228 MG/DL — HIGH (ref 70–99)
GLUCOSE BLDC GLUCOMTR-MCNC: 280 MG/DL — HIGH (ref 70–99)
GLUCOSE BLDC GLUCOMTR-MCNC: 282 MG/DL — HIGH (ref 70–99)
GLUCOSE BLDC GLUCOMTR-MCNC: 316 MG/DL — HIGH (ref 70–99)
GLUCOSE SERPL-MCNC: 289 MG/DL — HIGH (ref 70–115)
HCT VFR BLD CALC: 46.8 % — SIGNIFICANT CHANGE UP (ref 42–52)
HGB BLD-MCNC: 15.4 G/DL — SIGNIFICANT CHANGE UP (ref 14–18)
MAGNESIUM SERPL-MCNC: 2.6 MG/DL — SIGNIFICANT CHANGE UP (ref 1.6–2.6)
MCHC RBC-ENTMCNC: 28.7 PG — SIGNIFICANT CHANGE UP (ref 27–31)
MCHC RBC-ENTMCNC: 32.9 G/DL — SIGNIFICANT CHANGE UP (ref 32–36)
MCV RBC AUTO: 87.3 FL — SIGNIFICANT CHANGE UP (ref 80–94)
PHOSPHATE SERPL-MCNC: 2 MG/DL — LOW (ref 2.4–4.7)
PLATELET # BLD AUTO: 90 K/UL — LOW (ref 150–400)
POTASSIUM SERPL-MCNC: 4.6 MMOL/L — SIGNIFICANT CHANGE UP (ref 3.5–5.3)
POTASSIUM SERPL-SCNC: 4.6 MMOL/L — SIGNIFICANT CHANGE UP (ref 3.5–5.3)
PROT SERPL-MCNC: 8.2 G/DL — SIGNIFICANT CHANGE UP (ref 6.6–8.7)
RBC # BLD: 5.36 M/UL — SIGNIFICANT CHANGE UP (ref 4.6–6.2)
RBC # FLD: 13.9 % — SIGNIFICANT CHANGE UP (ref 11–15.6)
SODIUM SERPL-SCNC: 148 MMOL/L — HIGH (ref 135–145)
WBC # BLD: 5.6 K/UL — SIGNIFICANT CHANGE UP (ref 4.8–10.8)
WBC # FLD AUTO: 5.6 K/UL — SIGNIFICANT CHANGE UP (ref 4.8–10.8)

## 2018-03-08 PROCEDURE — 99232 SBSQ HOSP IP/OBS MODERATE 35: CPT | Mod: GC

## 2018-03-08 RX ORDER — INSULIN GLARGINE 100 [IU]/ML
38 INJECTION, SOLUTION SUBCUTANEOUS AT BEDTIME
Qty: 0 | Refills: 0 | Status: DISCONTINUED | OUTPATIENT
Start: 2018-03-08 | End: 2018-03-09

## 2018-03-08 RX ORDER — INSULIN LISPRO 100/ML
19 VIAL (ML) SUBCUTANEOUS
Qty: 0 | Refills: 0 | Status: DISCONTINUED | OUTPATIENT
Start: 2018-03-08 | End: 2018-03-09

## 2018-03-08 RX ADMIN — NYSTATIN CREAM 1 APPLICATION(S): 100000 CREAM TOPICAL at 06:53

## 2018-03-08 RX ADMIN — Medication 50 MILLIGRAM(S): at 21:35

## 2018-03-08 RX ADMIN — QUETIAPINE FUMARATE 100 MILLIGRAM(S): 200 TABLET, FILM COATED ORAL at 14:32

## 2018-03-08 RX ADMIN — Medication 4: at 21:44

## 2018-03-08 RX ADMIN — Medication 6: at 12:22

## 2018-03-08 RX ADMIN — Medication 19 UNIT(S): at 12:22

## 2018-03-08 RX ADMIN — Medication 5 MILLIGRAM(S): at 21:34

## 2018-03-08 RX ADMIN — HEPARIN SODIUM 5000 UNIT(S): 5000 INJECTION INTRAVENOUS; SUBCUTANEOUS at 18:01

## 2018-03-08 RX ADMIN — LATANOPROST 1 DROP(S): 0.05 SOLUTION/ DROPS OPHTHALMIC; TOPICAL at 21:37

## 2018-03-08 RX ADMIN — DIVALPROEX SODIUM 1000 MILLIGRAM(S): 500 TABLET, DELAYED RELEASE ORAL at 14:31

## 2018-03-08 RX ADMIN — HEPARIN SODIUM 5000 UNIT(S): 5000 INJECTION INTRAVENOUS; SUBCUTANEOUS at 06:53

## 2018-03-08 RX ADMIN — Medication 10 MILLIGRAM(S): at 06:59

## 2018-03-08 RX ADMIN — Medication 19 UNIT(S): at 18:01

## 2018-03-08 RX ADMIN — Medication 6: at 09:54

## 2018-03-08 RX ADMIN — Medication 8: at 18:02

## 2018-03-08 RX ADMIN — LITHIUM CARBONATE 450 MILLIGRAM(S): 300 TABLET, EXTENDED RELEASE ORAL at 14:31

## 2018-03-08 RX ADMIN — Medication 20 MILLIGRAM(S): at 14:31

## 2018-03-08 RX ADMIN — Medication 100 MILLIGRAM(S): at 06:53

## 2018-03-08 RX ADMIN — Medication 15 UNIT(S): at 09:52

## 2018-03-08 RX ADMIN — BRIMONIDINE TARTRATE 1 DROP(S): 2 SOLUTION/ DROPS OPHTHALMIC at 06:53

## 2018-03-08 RX ADMIN — NYSTATIN CREAM 1 APPLICATION(S): 100000 CREAM TOPICAL at 18:00

## 2018-03-08 RX ADMIN — ISOSORBIDE MONONITRATE 60 MILLIGRAM(S): 60 TABLET, EXTENDED RELEASE ORAL at 12:23

## 2018-03-08 RX ADMIN — DIVALPROEX SODIUM 500 MILLIGRAM(S): 500 TABLET, DELAYED RELEASE ORAL at 14:31

## 2018-03-08 RX ADMIN — Medication 50 MILLIGRAM(S): at 12:22

## 2018-03-08 RX ADMIN — QUETIAPINE FUMARATE 300 MILLIGRAM(S): 200 TABLET, FILM COATED ORAL at 12:22

## 2018-03-08 RX ADMIN — SIMVASTATIN 20 MILLIGRAM(S): 20 TABLET, FILM COATED ORAL at 21:35

## 2018-03-08 RX ADMIN — INSULIN GLARGINE 38 UNIT(S): 100 INJECTION, SOLUTION SUBCUTANEOUS at 21:44

## 2018-03-08 RX ADMIN — BRIMONIDINE TARTRATE 1 DROP(S): 2 SOLUTION/ DROPS OPHTHALMIC at 18:01

## 2018-03-08 NOTE — PROGRESS NOTE ADULT - SUBJECTIVE AND OBJECTIVE BOX
Resident Progress Note  Patient is a 67y old  Male who presents with a chief complaint of sent from [place] for AMS with hyperglycemia (07 Mar 2018 18:29)    INTERVAL/OVERNIGHT EVENTS: Patient seen and examined bedside this morning, no acute overnight events. Patient states he is doing well, and complains of intermittent chest pain.    ROS: Denies palpitations, sob, light headedness/dizziness difficulty breathing/cough, fevers/chills, abdominal pain, n/v, diarrhea/constipation, dysuria or increased urinary frequency    Vital Signs Last 24 Hrs  T(C): 36.6 (08 Mar 2018 07:02), Max: 36.9 (07 Mar 2018 16:16)  T(F): 97.9 (08 Mar 2018 07:02), Max: 98.5 (07 Mar 2018 23:05)  HR: 83 (08 Mar 2018 07:02) (79 - 102)  BP: 175/95 (08 Mar 2018 07:02) (138/79 - 175/95)  RR: 18 (08 Mar 2018 07:02) (17 - 18)  SpO2: 96% (08 Mar 2018 07:02) (94% - 96%)    PHYSICAL EXAM:  General: Elderly male, in NAD  HEENT: NC AT EOMI, moist mucous membranes.  Neck: Supple,  No JVD.   Respiratory: Clear to auscultation bilaterally, no wheezing, rales, or rhonchi.  Cardiovascular: RRR, no murmurs, rubs, or gallops.  Gastrointestinal: BS+, soft, non-tender, no masses palpable  Extremities: No peripheral edema.  Vascular: 2+ peripheral pulses.  Neurological: A/O x 3, no focal deficits.  Psychiatric: Normal mood, normal affect.  Musculoskeletal: moving all extremities  Skin: No rashes.    HEALTH ISSUES - PROBLEM Dx:  Chest pain: Chest pain  Constipation: Constipation  Need for prophylactic measure: Need for prophylactic measure  Glaucoma: Glaucoma  Hyperlipidemia: Hyperlipidemia  Hypertension: Hypertension  Hyponatremia: Hyponatremia  Bipolar 1 disorder: Bipolar 1 disorder  Seizure disorder: Seizure disorder  Diabetes: Diabetes  Hyperosmolar non-ketotic state in patient with type 2 diabetes mellitus: Hyperosmolar non-ketotic state in patient with type 2 diabetes mellitus    MEDICATIONS  (STANDING):  bisacodyl 5 milliGRAM(s) Oral at bedtime  brimonidine 0.2% Ophthalmic Solution 1 Drop(s) Both EYES every 12 hours  dextrose 5%. 1000 milliLiter(s) (50 mL/Hr) IV Continuous <Continuous>  dextrose 50% Injectable 12.5 Gram(s) IV Push once  dextrose 50% Injectable 25 Gram(s) IV Push once  dextrose 50% Injectable 25 Gram(s) IV Push once  diVALproex ER 1000 milliGRAM(s) Oral daily  diVALproex  milliGRAM(s) Oral daily  docusate sodium 100 milliGRAM(s) Oral two times a day  FLUoxetine 20 milliGRAM(s) Oral daily  heparin  Injectable 5000 Unit(s) SubCutaneous every 12 hours  insulin glargine Injectable (LANTUS) 30 Unit(s) SubCutaneous at bedtime  insulin lispro (HumaLOG) corrective regimen sliding scale   SubCutaneous Before meals and at bedtime  insulin lispro Injectable (HumaLOG) 15 Unit(s) SubCutaneous three times a day before meals  isosorbide   mononitrate ER Tablet (IMDUR) 60 milliGRAM(s) Oral daily  latanoprost 0.005% Ophthalmic Solution 1 Drop(s) Both EYES at bedtime  lithium 450 milliGRAM(s) Oral daily  metoprolol     tartrate 50 milliGRAM(s) Oral every 12 hours  nystatin Cream 1 Application(s) Topical two times a day  polyethylene glycol 3350 17 Gram(s) Oral daily  QUEtiapine 100 milliGRAM(s) Oral daily  QUEtiapine 100 milliGRAM(s) Oral daily  QUEtiapine 300 milliGRAM(s) Oral daily  simvastatin 20 milliGRAM(s) Oral at bedtime    MEDICATIONS  (PRN):  acetaminophen   Tablet. 650 milliGRAM(s) Oral every 6 hours PRN Moderate Pain (4 - 6)  dextrose Gel 1 Dose(s) Oral once PRN Blood Glucose LESS THAN 70 milliGRAM(s)/deciliter  glucagon  Injectable 1 milliGRAM(s) IntraMuscular once PRN Glucose LESS THAN 70 milligrams/deciliter  nitroglycerin     SubLingual 0.4 milliGRAM(s) SubLingual every 5 minutes PRN Chest Pain      LABS:                        12.9   7.3   )-----------( 95       ( 07 Mar 2018 05:45 )             39.6     03-08    148<H>  |  111<H>  |  11.0  ----------------------------<  289<H>  4.6   |  26.0  |  0.97    Ca    9.0      08 Mar 2018 06:38  Phos  2.0     03-08  Mg     2.6     03-08    TPro  8.2  /  Alb  3.5  /  TBili  0.5  /  DBili  x   /  AST  25  /  ALT  13  /  AlkPhos  96  03-08

## 2018-03-08 NOTE — ADVANCED PRACTICE NURSE CONSULT - RECOMMEDATIONS
continue diabetes self management education  pt to inject all insulin doses while in the hospital using insulin syringe and rn supervision  cc- pls task to diabetes wellness outpt

## 2018-03-08 NOTE — PROGRESS NOTE ADULT - PROBLEM SELECTOR PLAN 2
acute, likely 2/2 costochondritis but concern for arrythmia given hyperosmolar nonketotic state, will monitor glucose closely and trend troponin and CK  -EKG NSR no signs of acute ST changes  -Troponin <0.01

## 2018-03-08 NOTE — PROGRESS NOTE ADULT - PROBLEM SELECTOR PLAN 5
Stable, controlled on medication  -c/t quetiapine 100mg at 10am, 6pm and 300mg at 9pm every day   -c/t haloperidol decanoate 50mg/ml IM every 4 weeks on 4th monday of month at 3pm-11pm, next dose due on 3/26/18  -c/t prozac 20 QD at 10am for depression  -c/t lithium 450mg daily at 10am for bipolar disorder  -f/up am lithium level 0.30 yesterday  -consult placed for psychiatry while inpatient, will f/up - no note, yet. Stable, controlled on medication  -c/t quetiapine 100mg at 10am, 6pm and 300mg at 9pm every day   -c/t haloperidol decanoate 50mg/ml IM every 4 weeks on 4th Monday of month at 3pm-11pm, next dose due on 3/26/18  -c/t prozac 20 QD at 10am for depression  -c/t lithium 450mg daily at 10am for bipolar disorder  -f/up am lithium level 0.30 yesterday  -consult placed for psychiatry while inpatient, will f/up - no note, yet.

## 2018-03-08 NOTE — PROGRESS NOTE ADULT - PROBLEM SELECTOR PLAN 3
Controlled, stable  -c/t outpatient regimen of depakote (500mg ER at 10am, 1000mg ER at 6pm)  -f/up am depakote level 23.6

## 2018-03-08 NOTE — PROGRESS NOTE ADULT - PROBLEM SELECTOR PLAN 4
pseudohyponatremia due to hyperglycemic state Resolved  - Na 148, persistent  -c/w with iv hydration  -c/t monitor  -vitals per routine pseudohyponatremia due to hyperglycemic state Resolved  -c/w with iv hydration  -c/t monitor  -vitals per routine

## 2018-03-08 NOTE — PROGRESS NOTE ADULT - ATTENDING COMMENTS
I have seen and examined the patient, reviewed the chart, labs and diagnostics and I agree with assessment and plan as above with Family medicine resident. Note Edited where needed.    Un controlled Typ2 DM with Hyperglycemia: HBA1C > 11.0  Adjust Insulin regimen: Lantus 30 U Q HS with Meal time Lispro 15 U TID with sliding scale Insulin   consulted DM Educator, as Pt needs Insulin at discharge    continue Laxatives for Constipation    Chest pain: seems Musk ulo-skeletal. EKG reviewed. Neg troponin' s and ECHO pending     Anticipate Discharge back in am, if sugars well controlled
I have seen and examined the patient, reviewed the chart, labs and diagnostics and I agree with assessment and plan as above with Family medicine resident. Note Edited where needed.    Un controlled Typ2 DM with Hyperglycemia: HBA1C > 11.0  Adjust Insulin regimen: Lantus 26 U Q HS with Meal time Lispro 10 U TID with sliding scale Insulin  Monitor Over nite and also consult DM Educator, as Pt needs Insulin at discharge    continue fluids for AI-PRE RENAL from dehydration  Add Laxatives for Constipation    Chest pain: seems Musk ulo-skeletal. EKG reviewed. Trending troponin' s and also get ECHO
I have seen and examined the patient, reviewed the chart, labs and diagnostics and I agree with assessment and plan as above with Family medicine resident. Note edited where required    sugars still un controlled: Increased Lantus Insulin dose from 30 to 38 U and Meal time Insulin Lispro from 15 U to 19 U TID and Monitor sugars Over nite    If sugars are well controlled, plan for D/C back to [facility] in am  spoke with social work/case management

## 2018-03-08 NOTE — PROGRESS NOTE ADULT - ASSESSMENT
66yo M with PMH s/f dementia, DMII,  hyperlipidemia, biploar disorder (type I?), glaucoma who was sent from [facility] for [facility] and rehab due to "hyperglycemia, alert with confusion", found to be in nonketotic hyperosmolar hyperglycemic state (HHS), clinically improving.     .

## 2018-03-09 VITALS
SYSTOLIC BLOOD PRESSURE: 115 MMHG | HEART RATE: 81 BPM | TEMPERATURE: 98 F | OXYGEN SATURATION: 98 % | RESPIRATION RATE: 18 BRPM | DIASTOLIC BLOOD PRESSURE: 79 MMHG

## 2018-03-09 LAB
ANION GAP SERPL CALC-SCNC: 12 MMOL/L — SIGNIFICANT CHANGE UP (ref 5–17)
BUN SERPL-MCNC: 14 MG/DL — SIGNIFICANT CHANGE UP (ref 8–20)
CALCIUM SERPL-MCNC: 8.4 MG/DL — LOW (ref 8.6–10.2)
CHLORIDE SERPL-SCNC: 108 MMOL/L — HIGH (ref 98–107)
CO2 SERPL-SCNC: 28 MMOL/L — SIGNIFICANT CHANGE UP (ref 22–29)
CREAT SERPL-MCNC: 1.15 MG/DL — SIGNIFICANT CHANGE UP (ref 0.5–1.3)
GLUCOSE BLDC GLUCOMTR-MCNC: 152 MG/DL — HIGH (ref 70–99)
GLUCOSE BLDC GLUCOMTR-MCNC: 267 MG/DL — HIGH (ref 70–99)
GLUCOSE SERPL-MCNC: 121 MG/DL — HIGH (ref 70–115)
POTASSIUM SERPL-MCNC: 3.4 MMOL/L — LOW (ref 3.5–5.3)
POTASSIUM SERPL-SCNC: 3.4 MMOL/L — LOW (ref 3.5–5.3)
SODIUM SERPL-SCNC: 148 MMOL/L — HIGH (ref 135–145)

## 2018-03-09 PROCEDURE — 82947 ASSAY GLUCOSE BLOOD QUANT: CPT

## 2018-03-09 PROCEDURE — 80164 ASSAY DIPROPYLACETIC ACD TOT: CPT

## 2018-03-09 PROCEDURE — 99285 EMERGENCY DEPT VISIT HI MDM: CPT | Mod: 25

## 2018-03-09 PROCEDURE — 99239 HOSP IP/OBS DSCHRG MGMT >30: CPT

## 2018-03-09 PROCEDURE — 82962 GLUCOSE BLOOD TEST: CPT

## 2018-03-09 PROCEDURE — 74019 RADEX ABDOMEN 2 VIEWS: CPT

## 2018-03-09 PROCEDURE — 84484 ASSAY OF TROPONIN QUANT: CPT

## 2018-03-09 PROCEDURE — 82009 KETONE BODYS QUAL: CPT

## 2018-03-09 PROCEDURE — 83605 ASSAY OF LACTIC ACID: CPT

## 2018-03-09 PROCEDURE — 81003 URINALYSIS AUTO W/O SCOPE: CPT

## 2018-03-09 PROCEDURE — 70450 CT HEAD/BRAIN W/O DYE: CPT

## 2018-03-09 PROCEDURE — 93306 TTE W/DOPPLER COMPLETE: CPT

## 2018-03-09 PROCEDURE — 85027 COMPLETE CBC AUTOMATED: CPT

## 2018-03-09 PROCEDURE — 74021 RADEX ABDOMEN 3+ VIEWS: CPT

## 2018-03-09 PROCEDURE — 83036 HEMOGLOBIN GLYCOSYLATED A1C: CPT

## 2018-03-09 PROCEDURE — 84132 ASSAY OF SERUM POTASSIUM: CPT

## 2018-03-09 PROCEDURE — 36415 COLL VENOUS BLD VENIPUNCTURE: CPT

## 2018-03-09 PROCEDURE — 71045 X-RAY EXAM CHEST 1 VIEW: CPT

## 2018-03-09 PROCEDURE — 97163 PT EVAL HIGH COMPLEX 45 MIN: CPT

## 2018-03-09 PROCEDURE — 97110 THERAPEUTIC EXERCISES: CPT

## 2018-03-09 PROCEDURE — 84295 ASSAY OF SERUM SODIUM: CPT

## 2018-03-09 PROCEDURE — 84100 ASSAY OF PHOSPHORUS: CPT

## 2018-03-09 PROCEDURE — 80061 LIPID PANEL: CPT

## 2018-03-09 PROCEDURE — 93005 ELECTROCARDIOGRAM TRACING: CPT

## 2018-03-09 PROCEDURE — 82803 BLOOD GASES ANY COMBINATION: CPT

## 2018-03-09 PROCEDURE — 96376 TX/PRO/DX INJ SAME DRUG ADON: CPT

## 2018-03-09 PROCEDURE — 97116 GAIT TRAINING THERAPY: CPT

## 2018-03-09 PROCEDURE — 80048 BASIC METABOLIC PNL TOTAL CA: CPT

## 2018-03-09 PROCEDURE — 80178 ASSAY OF LITHIUM: CPT

## 2018-03-09 PROCEDURE — 96374 THER/PROPH/DIAG INJ IV PUSH: CPT

## 2018-03-09 PROCEDURE — 85014 HEMATOCRIT: CPT

## 2018-03-09 PROCEDURE — 82553 CREATINE MB FRACTION: CPT

## 2018-03-09 PROCEDURE — 82330 ASSAY OF CALCIUM: CPT

## 2018-03-09 PROCEDURE — 80053 COMPREHEN METABOLIC PANEL: CPT

## 2018-03-09 PROCEDURE — 82435 ASSAY OF BLOOD CHLORIDE: CPT

## 2018-03-09 PROCEDURE — 83735 ASSAY OF MAGNESIUM: CPT

## 2018-03-09 PROCEDURE — 82550 ASSAY OF CK (CPK): CPT

## 2018-03-09 RX ORDER — INSULIN GLARGINE 100 [IU]/ML
38 INJECTION, SOLUTION SUBCUTANEOUS
Qty: 0 | Refills: 0 | COMMUNITY
Start: 2018-03-09

## 2018-03-09 RX ORDER — POTASSIUM CHLORIDE 20 MEQ
40 PACKET (EA) ORAL ONCE
Qty: 0 | Refills: 0 | Status: COMPLETED | OUTPATIENT
Start: 2018-03-09 | End: 2018-03-09

## 2018-03-09 RX ORDER — INSULIN LISPRO 100/ML
19 VIAL (ML) SUBCUTANEOUS
Qty: 3 | Refills: 0 | OUTPATIENT
Start: 2018-03-09 | End: 2018-04-07

## 2018-03-09 RX ORDER — SENNA PLUS 8.6 MG/1
2 TABLET ORAL
Qty: 0 | Refills: 0 | COMMUNITY
Start: 2018-03-09

## 2018-03-09 RX ORDER — ISOSORBIDE MONONITRATE 60 MG/1
1 TABLET, EXTENDED RELEASE ORAL
Qty: 0 | Refills: 0 | COMMUNITY
Start: 2018-03-09

## 2018-03-09 RX ORDER — HALOPERIDOL DECANOATE 100 MG/ML
1 INJECTION INTRAMUSCULAR
Qty: 0 | Refills: 0 | COMMUNITY

## 2018-03-09 RX ORDER — SENNA PLUS 8.6 MG/1
2 TABLET ORAL AT BEDTIME
Qty: 0 | Refills: 0 | Status: DISCONTINUED | OUTPATIENT
Start: 2018-03-09 | End: 2018-03-09

## 2018-03-09 RX ORDER — DOCUSATE SODIUM 100 MG
1 CAPSULE ORAL
Qty: 0 | Refills: 0 | COMMUNITY
Start: 2018-03-09

## 2018-03-09 RX ORDER — INSULIN GLARGINE 100 [IU]/ML
50 INJECTION, SOLUTION SUBCUTANEOUS
Qty: 0 | Refills: 0 | DISCHARGE
Start: 2018-03-09

## 2018-03-09 RX ORDER — ISOSORBIDE MONONITRATE 60 MG/1
1 TABLET, EXTENDED RELEASE ORAL
Qty: 0 | Refills: 0 | COMMUNITY

## 2018-03-09 RX ADMIN — Medication 20 MILLIGRAM(S): at 11:45

## 2018-03-09 RX ADMIN — NYSTATIN CREAM 1 APPLICATION(S): 100000 CREAM TOPICAL at 06:00

## 2018-03-09 RX ADMIN — QUETIAPINE FUMARATE 100 MILLIGRAM(S): 200 TABLET, FILM COATED ORAL at 11:45

## 2018-03-09 RX ADMIN — Medication 6: at 11:47

## 2018-03-09 RX ADMIN — Medication 40 MILLIEQUIVALENT(S): at 08:47

## 2018-03-09 RX ADMIN — HEPARIN SODIUM 5000 UNIT(S): 5000 INJECTION INTRAVENOUS; SUBCUTANEOUS at 05:59

## 2018-03-09 RX ADMIN — DIVALPROEX SODIUM 1000 MILLIGRAM(S): 500 TABLET, DELAYED RELEASE ORAL at 11:46

## 2018-03-09 RX ADMIN — Medication 19 UNIT(S): at 11:46

## 2018-03-09 RX ADMIN — Medication 50 MILLIGRAM(S): at 11:45

## 2018-03-09 RX ADMIN — BRIMONIDINE TARTRATE 1 DROP(S): 2 SOLUTION/ DROPS OPHTHALMIC at 06:01

## 2018-03-09 RX ADMIN — POLYETHYLENE GLYCOL 3350 17 GRAM(S): 17 POWDER, FOR SOLUTION ORAL at 11:46

## 2018-03-09 RX ADMIN — DIVALPROEX SODIUM 500 MILLIGRAM(S): 500 TABLET, DELAYED RELEASE ORAL at 11:46

## 2018-03-09 RX ADMIN — Medication 100 MILLIGRAM(S): at 05:59

## 2018-03-09 RX ADMIN — ISOSORBIDE MONONITRATE 60 MILLIGRAM(S): 60 TABLET, EXTENDED RELEASE ORAL at 11:46

## 2018-03-09 RX ADMIN — LITHIUM CARBONATE 450 MILLIGRAM(S): 300 TABLET, EXTENDED RELEASE ORAL at 11:45

## 2018-03-09 RX ADMIN — QUETIAPINE FUMARATE 300 MILLIGRAM(S): 200 TABLET, FILM COATED ORAL at 11:45

## 2018-03-09 RX ADMIN — Medication 19 UNIT(S): at 08:47

## 2018-03-09 NOTE — CHART NOTE - NSCHARTNOTEFT_GEN_A_CORE
Nurse called b/c pt was having some chest discomfort    I went to evaluated pt , he was in NAC ,laying in bed , comfortable , pt has similar episodes of chest discomfort during this hospital course , troponin were negative  2  and is TTE shows EF= 60 % with grade I diastolic dysfunction .    vitals sings  bp: 140/80  hr: 80   tem: 36. 5  On Physical exam his pain is reproducible with palpation over chest wall , no concern for ACS.   Case discuss with WESLEY and Dr. Pro

## 2018-03-09 NOTE — PROGRESS NOTE ADULT - PROBLEM SELECTOR PLAN 2
resolve  acute, likely 2/2 costochondritis but concern for arrythmia given hyperosmolar nonketotic state, will monitor glucose closely and trend troponin and CK  -EKG NSR no signs of acute ST changes  -Troponin <0.01 resolved  acute, likely 2/2 costochondritis but initial concern for arrythmia given hyperosmolar nonketotic state  -EKG NSR no signs of acute ST changes  -serial troponin negative

## 2018-03-09 NOTE — PROGRESS NOTE ADULT - PROBLEM SELECTOR PLAN 10
Patient stating difficulty ambulating with 2-3 person assist (though poor historian), despite [facility] chart stating independently ambulates will c/t ambulation with assistance for now and f/up PT consult  -lovenox 40mg SQ for DVT prophylaxis
Patient stating difficulty ambulating with 2-3 person assist (though poor historian), despite [facility] chart stating independently ambulates will c/t ambulation with assistance for now and f/up PT consult  -lovenox 40mg SQ for DVT prophylaxis
Patient stating difficulty ambulating with 2-3 person assist (though poor historian), despite [facility] chart stating independently ambulates will c/t ambulation with assistance for now  PT consult recommend return to [facility] for safety due to cognition, use of RW  -Lovenox 40mg SQ for DVT prophylaxis
Patient stating difficulty ambulating with 2-3 person assist (though poor historian), despite [facility] chart stating independently ambulates will c/t ambulation with assistance for now and f/up PT consult  -lovenox 40mg SQ for DVT prophylaxis

## 2018-03-09 NOTE — PROGRESS NOTE ADULT - PROBLEM SELECTOR PLAN 6
Controlled outpatient  -c/t home med metoprolol 50 BID starting 3/6 at 10am and 6pm  -c/t isosorbide mononitrate ER 30 daily at 10am Controlled outpatient  -c/t home med metoprolol 50 BID starting 3/6 at 10am and 6pm  -c/t isosorbide mononitrate ER 60 daily

## 2018-03-09 NOTE — PROGRESS NOTE ADULT - PROBLEM SELECTOR PLAN 3
Controlled, stable  -c/t outpatient regimen of depakote (500mg ER at 10am, 1000mg ER at 6pm)  -f/up am depakote level 23.6 Controlled, stable  -c/t outpatient regimen of depakote (500mg ER at 10am, 1000mg ER at 6pm)  -depakote level 23.6 Controlled, stable  -c/t outpatient regimen of Depakote (500mg ER at 10am, 1000mg ER at 6pm)  -Depakote level 23.6

## 2018-03-09 NOTE — PROGRESS NOTE ADULT - PROBLEM SELECTOR PLAN 1
Acute onset, likely 2/2 noncompliance with diet (reported to be drinking multiple sodas yesterday as per [facility] faculty), with inconsistent use of Januvia   -Glucose was 1092 on admission, then 680, now from 405 to 306 to 205  -s/p insulin IV push 10 units x5 + 8units = 36 units + lantus 26  10 units premeals + sliding scale + Lantus 26 units HS  -NS @ 150cc/hr for one day  -diet: consistent carb, no snacks, DASH/TLC and lowfat  -no ketones in urine, gap 19 on admission  - Anion gap closed  - patient with persistent mild hypernatremia, will encourage free water intake Acute onset, likely 2/2 noncompliance with diet (reported to be drinking multiple sodas prior to admission per [facility] faculty), with inconsistent use of Januvia   -Glucose was 1092 on admission, then 680, now from 405 to 306 to 205  -s/p insulin IV push 10 units x5 + 8units = 36 units + lantus 26  10 units premeals + sliding scale + Lantus 26 units HS  -NS @ 150cc/hr for one day  -diet: consistent carb, no snacks, DASH/TLC and lowfat  -no ketones in urine, gap 19 on admission  - Anion gap closed  - patient with persistent mild hypernatremia, will encourage free water intake Acute onset, likely 2/2 noncompliance with diet (reported to be drinking multiple sodas prior to admission per [facility]), with inconsistent use of Januvia   -Glucose was 1092 on admission, no better controlled, AM   -diet: consistent carb, no snacks, DASH/TLC and lowfat  -no ketones in urine, gap 19 on admission  - Anion gap closed  - patient with persistent mild hypernatremia, will encourage free water intake

## 2018-03-09 NOTE — PROGRESS NOTE ADULT - PROBLEM SELECTOR PLAN 9
Patient stating LBM unknown but "many days ago", with softly distended abdomen with discomfort to palpation  -C/W Miralax and colace  -will c/t monitor -C/W Miralax and colace  -will c/t monitor

## 2018-03-09 NOTE — PROGRESS NOTE ADULT - PROBLEM SELECTOR PLAN 7
Controlled outpatient  -c/t simvastatin 20 outpatient med

## 2018-03-09 NOTE — PROGRESS NOTE ADULT - SUBJECTIVE AND OBJECTIVE BOX
Resident Progress Note  Patient is a 67y old  Male who presents with a chief complaint of sent from [place] for AMS with hyperglycemia (07 Mar 2018 18:29)      INTERVAL/OVERNIGHT EVENTS: Pt on hospital day **, seen and examined bedside this morning.   Tolerating ** diet, Ambulating ( ), Voiding ( ), Last Bowel Movement ****      ROS: No chest pain, palpitations, sob, light headedness/dizziness difficulty breathing/cough, fevers/chills, abdominal pain, n/v, diarrhea/constipation, dysuria or increased urinary frequency      Vital Signs Last 24 Hrs  T(C): 36.9 (08 Mar 2018 21:46), Max: 36.9 (08 Mar 2018 21:46)  T(F): 98.5 (08 Mar 2018 21:46), Max: 98.5 (08 Mar 2018 21:46)  HR: 91 (08 Mar 2018 21:46) (91 - 93)  BP: 128/80 (08 Mar 2018 21:46) (128/80 - 146/98)  BP(mean): --  RR: 18 (08 Mar 2018 21:46) (18 - 18)  SpO2: 97% (08 Mar 2018 21:46) (97% - 97%)  I&O's Summary    08 Mar 2018 07:01  -  09 Mar 2018 07:00  --------------------------------------------------------  IN: 240 mL / OUT: 0 mL / NET: 240 mL    PHYSICAL EXAM:  General: Elderly male in NAD  HEENT: NC AT EOMI, moist mucous membranes.  Neck: Supple  Respiratory: Clear to auscultation bilaterally, no wheezing, rales, or rhonchi.  Cardiovascular: RRR, no murmurs, rubs, or gallops.  Gastrointestinal: BS+, soft, non-tender, no masses palpable  Extremities: No peripheral edema.  Vascular: 2+ peripheral pulses.  Neurological: A/O x 3, no focal deficits.  Psychiatric: Normal mood, normal affect.  Musculoskeletal: moving all extremities  Skin: No rashes.    HEALTH ISSUES - PROBLEM Dx:  Chest pain: Chest pain  Constipation: Constipation  Need for prophylactic measure: Need for prophylactic measure  Glaucoma: Glaucoma  Hyperlipidemia: Hyperlipidemia  Hypertension: Hypertension  Hyponatremia: Hyponatremia  Bipolar 1 disorder: Bipolar 1 disorder  Seizure disorder: Seizure disorder  Diabetes: Diabetes  Hyperosmolar non-ketotic state in patient with type 2 diabetes mellitus: Hyperosmolar non-ketotic state in patient with type 2 diabetes mellitus    MEDICATIONS  (STANDING):  bisacodyl 5 milliGRAM(s) Oral at bedtime  brimonidine 0.2% Ophthalmic Solution 1 Drop(s) Both EYES every 12 hours  dextrose 5%. 1000 milliLiter(s) (50 mL/Hr) IV Continuous <Continuous>  dextrose 50% Injectable 12.5 Gram(s) IV Push once  dextrose 50% Injectable 25 Gram(s) IV Push once  dextrose 50% Injectable 25 Gram(s) IV Push once  diVALproex ER 1000 milliGRAM(s) Oral daily  diVALproex  milliGRAM(s) Oral daily  docusate sodium 100 milliGRAM(s) Oral two times a day  FLUoxetine 20 milliGRAM(s) Oral daily  heparin  Injectable 5000 Unit(s) SubCutaneous every 12 hours  insulin glargine Injectable (LANTUS) 38 Unit(s) SubCutaneous at bedtime  insulin lispro (HumaLOG) corrective regimen sliding scale   SubCutaneous Before meals and at bedtime  insulin lispro Injectable (HumaLOG) 19 Unit(s) SubCutaneous three times a day before meals  isosorbide   mononitrate ER Tablet (IMDUR) 60 milliGRAM(s) Oral daily  latanoprost 0.005% Ophthalmic Solution 1 Drop(s) Both EYES at bedtime  lithium 450 milliGRAM(s) Oral daily  metoprolol     tartrate 50 milliGRAM(s) Oral every 12 hours  nystatin Cream 1 Application(s) Topical two times a day  polyethylene glycol 3350 17 Gram(s) Oral daily  QUEtiapine 100 milliGRAM(s) Oral daily  QUEtiapine 100 milliGRAM(s) Oral daily  QUEtiapine 300 milliGRAM(s) Oral daily  simvastatin 20 milliGRAM(s) Oral at bedtime    MEDICATIONS  (PRN):  acetaminophen   Tablet. 650 milliGRAM(s) Oral every 6 hours PRN Moderate Pain (4 - 6)  dextrose Gel 1 Dose(s) Oral once PRN Blood Glucose LESS THAN 70 milliGRAM(s)/deciliter  glucagon  Injectable 1 milliGRAM(s) IntraMuscular once PRN Glucose LESS THAN 70 milligrams/deciliter  nitroglycerin     SubLingual 0.4 milliGRAM(s) SubLingual every 5 minutes PRN Chest Pain      LABS:                        15.4   5.6   )-----------( 90       ( 08 Mar 2018 06:38 )             46.8     03-09    148<H>  |  108<H>  |  14.0  ----------------------------<  121<H>  3.4<L>   |  28.0  |  1.15    Ca    8.4<L>      09 Mar 2018 05:51  Phos  2.0     03-08  Mg     2.6     03-08    TPro  8.2  /  Alb  3.5  /  TBili  0.5  /  DBili  x   /  AST  25  /  ALT  13  /  AlkPhos  96  03-08 Resident Progress Note  Patient is a 67y old  Male who presents with a chief complaint of sent from [place] for AMS with hyperglycemia (07 Mar 2018 18:29)      INTERVAL/OVERNIGHT EVENTS: Patient seen and examined bedside this morning. No acute overnight events. Patient unsure of last BM    ROS: Denies chest pain, palpitations, sob, light headedness/dizziness difficulty breathing/cough, fevers/chills, abdominal pain, n/v, dysuria or increased urinary frequency    Vital Signs Last 24 Hrs  T(C): 36.9 (08 Mar 2018 21:46), Max: 36.9 (08 Mar 2018 21:46)  T(F): 98.5 (08 Mar 2018 21:46), Max: 98.5 (08 Mar 2018 21:46)  HR: 91 (08 Mar 2018 21:46) (91 - 93)  BP: 128/80 (08 Mar 2018 21:46) (128/80 - 146/98)  RR: 18 (08 Mar 2018 21:46) (18 - 18)  SpO2: 97% (08 Mar 2018 21:46) (97% - 97%)    PHYSICAL EXAM:  General: Elderly male in NAD  HEENT: NC AT EOMI, moist mucous membranes.  Neck: Supple  Respiratory: Clear to auscultation bilaterally, no wheezing, rales, or rhonchi.  Cardiovascular: RRR, no murmurs, rubs, or gallops.  Gastrointestinal: BS+, soft, non-tender, no masses palpable  Extremities: No peripheral edema.  Vascular: 2+ peripheral pulses.  Neurological: A/O x 3, no focal deficits.  Psychiatric: Normal mood, normal affect.  Musculoskeletal: moving all extremities  Skin: No rashes.    HEALTH ISSUES - PROBLEM Dx:  Chest pain: Chest pain  Constipation: Constipation  Need for prophylactic measure: Need for prophylactic measure  Glaucoma: Glaucoma  Hyperlipidemia: Hyperlipidemia  Hypertension: Hypertension  Hyponatremia: Hyponatremia  Bipolar 1 disorder: Bipolar 1 disorder  Seizure disorder: Seizure disorder  Diabetes: Diabetes  Hyperosmolar non-ketotic state in patient with type 2 diabetes mellitus: Hyperosmolar non-ketotic state in patient with type 2 diabetes mellitus    MEDICATIONS  (STANDING):  bisacodyl 5 milliGRAM(s) Oral at bedtime  brimonidine 0.2% Ophthalmic Solution 1 Drop(s) Both EYES every 12 hours  dextrose 5%. 1000 milliLiter(s) (50 mL/Hr) IV Continuous <Continuous>  dextrose 50% Injectable 12.5 Gram(s) IV Push once  dextrose 50% Injectable 25 Gram(s) IV Push once  dextrose 50% Injectable 25 Gram(s) IV Push once  diVALproex ER 1000 milliGRAM(s) Oral daily  diVALproex  milliGRAM(s) Oral daily  docusate sodium 100 milliGRAM(s) Oral two times a day  FLUoxetine 20 milliGRAM(s) Oral daily  heparin  Injectable 5000 Unit(s) SubCutaneous every 12 hours  insulin glargine Injectable (LANTUS) 38 Unit(s) SubCutaneous at bedtime  insulin lispro (HumaLOG) corrective regimen sliding scale   SubCutaneous Before meals and at bedtime  insulin lispro Injectable (HumaLOG) 19 Unit(s) SubCutaneous three times a day before meals  isosorbide   mononitrate ER Tablet (IMDUR) 60 milliGRAM(s) Oral daily  latanoprost 0.005% Ophthalmic Solution 1 Drop(s) Both EYES at bedtime  lithium 450 milliGRAM(s) Oral daily  metoprolol     tartrate 50 milliGRAM(s) Oral every 12 hours  nystatin Cream 1 Application(s) Topical two times a day  polyethylene glycol 3350 17 Gram(s) Oral daily  QUEtiapine 100 milliGRAM(s) Oral daily  QUEtiapine 100 milliGRAM(s) Oral daily  QUEtiapine 300 milliGRAM(s) Oral daily  simvastatin 20 milliGRAM(s) Oral at bedtime    MEDICATIONS  (PRN):  acetaminophen   Tablet. 650 milliGRAM(s) Oral every 6 hours PRN Moderate Pain (4 - 6)  dextrose Gel 1 Dose(s) Oral once PRN Blood Glucose LESS THAN 70 milliGRAM(s)/deciliter  glucagon  Injectable 1 milliGRAM(s) IntraMuscular once PRN Glucose LESS THAN 70 milligrams/deciliter  nitroglycerin     SubLingual 0.4 milliGRAM(s) SubLingual every 5 minutes PRN Chest Pain      LABS:                        15.4   5.6   )-----------( 90       ( 08 Mar 2018 06:38 )             46.8     03-09    148<H>  |  108<H>  |  14.0  ----------------------------<  121<H>  3.4<L>   |  28.0  |  1.15    Ca    8.4<L>      09 Mar 2018 05:51  Phos  2.0     03-08  Mg     2.6     03-08    TPro  8.2  /  Alb  3.5  /  TBili  0.5  /  DBili  x   /  AST  25  /  ALT  13  /  AlkPhos  96  03-08

## 2019-03-07 ENCOUNTER — EMERGENCY (EMERGENCY)
Facility: HOSPITAL | Age: 69
LOS: 1 days | Discharge: HOME | End: 2019-03-07
Attending: EMERGENCY MEDICINE
Payer: COMMERCIAL

## 2019-03-07 VITALS
HEIGHT: 70 IN | HEART RATE: 114 BPM | TEMPERATURE: 98 F | RESPIRATION RATE: 18 BRPM | DIASTOLIC BLOOD PRESSURE: 99 MMHG | SYSTOLIC BLOOD PRESSURE: 166 MMHG | OXYGEN SATURATION: 96 % | WEIGHT: 240.08 LBS

## 2019-03-07 PROCEDURE — 99284 EMERGENCY DEPT VISIT MOD MDM: CPT

## 2019-03-07 PROCEDURE — 74022 RADEX COMPL AQT ABD SERIES: CPT | Mod: 26

## 2019-03-07 RX ORDER — FLUOXETINE HCL 10 MG
1 CAPSULE ORAL
Qty: 0 | Refills: 0 | COMMUNITY

## 2019-03-07 RX ORDER — DIVALPROEX SODIUM 500 MG/1
2 TABLET, DELAYED RELEASE ORAL
Qty: 0 | Refills: 0 | COMMUNITY

## 2019-03-07 RX ORDER — HALOPERIDOL DECANOATE 100 MG/ML
1 INJECTION INTRAMUSCULAR
Qty: 0 | Refills: 0 | COMMUNITY

## 2019-03-07 RX ORDER — DIVALPROEX SODIUM 500 MG/1
1 TABLET, DELAYED RELEASE ORAL
Qty: 0 | Refills: 0 | COMMUNITY

## 2019-03-07 RX ORDER — SODIUM CHLORIDE 9 MG/ML
3 INJECTION INTRAMUSCULAR; INTRAVENOUS; SUBCUTANEOUS ONCE
Qty: 0 | Refills: 0 | Status: COMPLETED | OUTPATIENT
Start: 2019-03-07 | End: 2019-03-07

## 2019-03-07 RX ORDER — SODIUM CHLORIDE 9 MG/ML
1000 INJECTION INTRAMUSCULAR; INTRAVENOUS; SUBCUTANEOUS ONCE
Qty: 0 | Refills: 0 | Status: COMPLETED | OUTPATIENT
Start: 2019-03-07 | End: 2019-03-07

## 2019-03-07 RX ORDER — LITHIUM CARBONATE 300 MG/1
450 TABLET, EXTENDED RELEASE ORAL
Qty: 0 | Refills: 0 | COMMUNITY

## 2019-03-07 NOTE — ED ADULT NURSE NOTE - OBJECTIVE STATEMENT
assumed care of pt in hallway. pt lying on stretcher comfortably. received pt in yellow gown. pt calm and cooperative at this time. pt alert and oriented to person and place but not time. when asked what year it was, pt states "he is unsure". when asked why patient is here, patient states he is " confused and does not know why I am here". pt denies any pain or discomfort at this time. pt states that he was taken here by ambulance but doesn't know why.

## 2019-03-07 NOTE — ED PROVIDER NOTE - OBJECTIVE STATEMENT
69 y/o M with PMHx of dementia, schizophrenia, bipolar I disorder, DM, HTN and hepatitis presents to ED but is unsure why he is here. States "I think this is a set up, I do not know why they sent me here". When asked if he is experiencing diarrhea he states "sometimes". As per triage note, the patient became combative at the facility where he lives and was refusing to take his medication. He states "I do not like taking my mediations because they make me sick". Denies abdominal pain, nausea, vomiting or decreased PO intake. Patient is a poor historian. No further acute complaints at this time.

## 2019-03-07 NOTE — ED ADULT NURSE NOTE - INTERVENTIONS DEFINITIONS
Non-slip footwear when patient is off stretcher/Call bell, personal items and telephone within reach/Stretcher in lowest position, wheels locked, appropriate side rails in place/[place] to call system/Provide visual cue, wrist band, yellow gown, etc.

## 2019-03-07 NOTE — ED PROVIDER NOTE - CLINICAL SUMMARY MEDICAL DECISION MAKING FREE TEXT BOX
Check labs, x-ray and re-evaluate, if stable and workup is negative will dc back to nursing facility.

## 2019-03-07 NOTE — ED PROVIDER NOTE - PROGRESS NOTE DETAILS
Labs and x-rays as noted.  Pt stable for d/c back to NH After speaking with RN at NH to give report she states pt was also sent for [facility] eval for medication non-compliance.  Discharge cancelled and psych consult called.  Signed out pending psych eval and final dispo

## 2019-03-07 NOTE — ED ADULT NURSE NOTE - CHIEF COMPLAINT QUOTE
Patient BIBA to ED today from [facility] where he was not taking any of his medications due to diarrhea he states and after being found to be combative.  Patient has been drinking copious amounts of soda and is a diabetic.  Patient with history of schizophrenia, dementia.

## 2019-03-07 NOTE — ED ADULT TRIAGE NOTE - CHIEF COMPLAINT QUOTE
Patient BIBA to ED today from [facility] where he was not taking any of his medications due to diarrhea he states and after being found to be combative.  Patient has been drinking copious amounts of soda and is a diabetic.  Patient with history of schizophrenia, dementia.

## 2019-03-08 VITALS
SYSTOLIC BLOOD PRESSURE: 122 MMHG | HEART RATE: 91 BPM | OXYGEN SATURATION: 95 % | DIASTOLIC BLOOD PRESSURE: 83 MMHG | RESPIRATION RATE: 19 BRPM

## 2019-03-08 DIAGNOSIS — F03.90 UNSPECIFIED DEMENTIA WITHOUT BEHAVIORAL DISTURBANCE: ICD-10-CM

## 2019-03-08 DIAGNOSIS — F25.8 OTHER SCHIZOAFFECTIVE DISORDERS: ICD-10-CM

## 2019-03-08 DIAGNOSIS — R69 ILLNESS, UNSPECIFIED: ICD-10-CM

## 2019-03-08 LAB
ALBUMIN SERPL ELPH-MCNC: 3.5 G/DL — SIGNIFICANT CHANGE UP (ref 3.3–5.2)
ALP SERPL-CCNC: 74 U/L — SIGNIFICANT CHANGE UP (ref 40–120)
ALT FLD-CCNC: 48 U/L — HIGH
ANION GAP SERPL CALC-SCNC: 15 MMOL/L — SIGNIFICANT CHANGE UP (ref 5–17)
APPEARANCE UR: CLEAR — SIGNIFICANT CHANGE UP
AST SERPL-CCNC: 49 U/L — HIGH
BACTERIA # UR AUTO: ABNORMAL
BASOPHILS # BLD AUTO: 0 K/UL — SIGNIFICANT CHANGE UP (ref 0–0.2)
BASOPHILS NFR BLD AUTO: 0.1 % — SIGNIFICANT CHANGE UP (ref 0–2)
BILIRUB SERPL-MCNC: 0.5 MG/DL — SIGNIFICANT CHANGE UP (ref 0.4–2)
BILIRUB UR-MCNC: NEGATIVE — SIGNIFICANT CHANGE UP
BUN SERPL-MCNC: 9 MG/DL — SIGNIFICANT CHANGE UP (ref 8–20)
CALCIUM SERPL-MCNC: 9 MG/DL — SIGNIFICANT CHANGE UP (ref 8.6–10.2)
CHLORIDE SERPL-SCNC: 102 MMOL/L — SIGNIFICANT CHANGE UP (ref 98–107)
CO2 SERPL-SCNC: 23 MMOL/L — SIGNIFICANT CHANGE UP (ref 22–29)
COLOR SPEC: YELLOW — SIGNIFICANT CHANGE UP
CREAT SERPL-MCNC: 0.8 MG/DL — SIGNIFICANT CHANGE UP (ref 0.5–1.3)
DIFF PNL FLD: NEGATIVE — SIGNIFICANT CHANGE UP
EOSINOPHIL # BLD AUTO: 0.1 K/UL — SIGNIFICANT CHANGE UP (ref 0–0.5)
EOSINOPHIL NFR BLD AUTO: 1.2 % — SIGNIFICANT CHANGE UP (ref 0–5)
EPI CELLS # UR: SIGNIFICANT CHANGE UP
GLUCOSE SERPL-MCNC: 154 MG/DL — HIGH (ref 70–115)
GLUCOSE UR QL: NEGATIVE MG/DL — SIGNIFICANT CHANGE UP
HCT VFR BLD CALC: 40.4 % — LOW (ref 42–52)
HGB BLD-MCNC: 13.4 G/DL — LOW (ref 14–18)
KETONES UR-MCNC: NEGATIVE — SIGNIFICANT CHANGE UP
LEUKOCYTE ESTERASE UR-ACNC: ABNORMAL
LIDOCAIN IGE QN: 27 U/L — SIGNIFICANT CHANGE UP (ref 22–51)
LITHIUM SERPL-MCNC: 0.1 MMOL/L — LOW (ref 0.5–1.5)
LYMPHOCYTES # BLD AUTO: 1.3 K/UL — SIGNIFICANT CHANGE UP (ref 1–4.8)
LYMPHOCYTES # BLD AUTO: 18.1 % — LOW (ref 20–55)
MCHC RBC-ENTMCNC: 29.4 PG — SIGNIFICANT CHANGE UP (ref 27–31)
MCHC RBC-ENTMCNC: 33.2 G/DL — SIGNIFICANT CHANGE UP (ref 32–36)
MCV RBC AUTO: 88.6 FL — SIGNIFICANT CHANGE UP (ref 80–94)
MONOCYTES # BLD AUTO: 0.6 K/UL — SIGNIFICANT CHANGE UP (ref 0–0.8)
MONOCYTES NFR BLD AUTO: 8.7 % — SIGNIFICANT CHANGE UP (ref 3–10)
NEUTROPHILS # BLD AUTO: 5.2 K/UL — SIGNIFICANT CHANGE UP (ref 1.8–8)
NEUTROPHILS NFR BLD AUTO: 71.8 % — SIGNIFICANT CHANGE UP (ref 37–73)
NITRITE UR-MCNC: NEGATIVE — SIGNIFICANT CHANGE UP
PH UR: 6 — SIGNIFICANT CHANGE UP (ref 5–8)
PLATELET # BLD AUTO: 177 K/UL — SIGNIFICANT CHANGE UP (ref 150–400)
POTASSIUM SERPL-MCNC: 3.4 MMOL/L — LOW (ref 3.5–5.3)
POTASSIUM SERPL-SCNC: 3.4 MMOL/L — LOW (ref 3.5–5.3)
PROT SERPL-MCNC: 7.7 G/DL — SIGNIFICANT CHANGE UP (ref 6.6–8.7)
PROT UR-MCNC: 15 MG/DL
RBC # BLD: 4.56 M/UL — LOW (ref 4.6–6.2)
RBC # FLD: 13.1 % — SIGNIFICANT CHANGE UP (ref 11–15.6)
RBC CASTS # UR COMP ASSIST: NEGATIVE /HPF — SIGNIFICANT CHANGE UP (ref 0–4)
SODIUM SERPL-SCNC: 140 MMOL/L — SIGNIFICANT CHANGE UP (ref 135–145)
SP GR SPEC: 1.01 — SIGNIFICANT CHANGE UP (ref 1.01–1.02)
UROBILINOGEN FLD QL: NEGATIVE MG/DL — SIGNIFICANT CHANGE UP
VALPROATE SERPL-MCNC: <3.7 UG/ML — LOW (ref 50–100)
WBC # BLD: 7.2 K/UL — SIGNIFICANT CHANGE UP (ref 4.8–10.8)
WBC # FLD AUTO: 7.2 K/UL — SIGNIFICANT CHANGE UP (ref 4.8–10.8)
WBC UR QL: SIGNIFICANT CHANGE UP

## 2019-03-08 PROCEDURE — 83690 ASSAY OF LIPASE: CPT

## 2019-03-08 PROCEDURE — 96372 THER/PROPH/DIAG INJ SC/IM: CPT

## 2019-03-08 PROCEDURE — 36415 COLL VENOUS BLD VENIPUNCTURE: CPT

## 2019-03-08 PROCEDURE — 80178 ASSAY OF LITHIUM: CPT

## 2019-03-08 PROCEDURE — 99284 EMERGENCY DEPT VISIT MOD MDM: CPT

## 2019-03-08 PROCEDURE — 85027 COMPLETE CBC AUTOMATED: CPT

## 2019-03-08 PROCEDURE — 99284 EMERGENCY DEPT VISIT MOD MDM: CPT | Mod: 25

## 2019-03-08 PROCEDURE — 74022 RADEX COMPL AQT ABD SERIES: CPT

## 2019-03-08 PROCEDURE — 82962 GLUCOSE BLOOD TEST: CPT

## 2019-03-08 PROCEDURE — 81001 URINALYSIS AUTO W/SCOPE: CPT

## 2019-03-08 PROCEDURE — 80164 ASSAY DIPROPYLACETIC ACD TOT: CPT

## 2019-03-08 PROCEDURE — 80053 COMPREHEN METABOLIC PANEL: CPT

## 2019-03-08 RX ORDER — POTASSIUM CHLORIDE 20 MEQ
40 PACKET (EA) ORAL ONCE
Qty: 0 | Refills: 0 | Status: COMPLETED | OUTPATIENT
Start: 2019-03-08 | End: 2019-03-08

## 2019-03-08 RX ORDER — LITHIUM CARBONATE 300 MG/1
150 TABLET, EXTENDED RELEASE ORAL ONCE
Qty: 0 | Refills: 0 | Status: COMPLETED | OUTPATIENT
Start: 2019-03-08 | End: 2019-03-08

## 2019-03-08 RX ORDER — HALOPERIDOL DECANOATE 100 MG/ML
2.5 INJECTION INTRAMUSCULAR ONCE
Qty: 0 | Refills: 0 | Status: COMPLETED | OUTPATIENT
Start: 2019-03-08 | End: 2019-03-08

## 2019-03-08 RX ORDER — VALPROIC ACID (AS SODIUM SALT) 250 MG/5ML
1000 SOLUTION, ORAL ORAL ONCE
Qty: 0 | Refills: 0 | Status: COMPLETED | OUTPATIENT
Start: 2019-03-08 | End: 2019-03-08

## 2019-03-08 RX ADMIN — HALOPERIDOL DECANOATE 2.5 MILLIGRAM(S): 100 INJECTION INTRAMUSCULAR at 00:21

## 2019-03-08 RX ADMIN — Medication 1000 MILLIGRAM(S): at 12:15

## 2019-03-08 RX ADMIN — Medication 40 MILLIEQUIVALENT(S): at 03:57

## 2019-03-08 RX ADMIN — Medication 2 MILLIGRAM(S): at 00:21

## 2019-03-08 RX ADMIN — LITHIUM CARBONATE 150 MILLIGRAM(S): 300 TABLET, EXTENDED RELEASE ORAL at 12:14

## 2019-03-08 NOTE — ED ADULT NURSE REASSESSMENT NOTE - NS ED NURSE REASSESS COMMENT FT1
Pt received A&O x's 3. Denies any complaint at this time. Has large BM and was cleaned. Psych over to see patient, awaiting lab results and dispo.

## 2019-03-08 NOTE — ED BEHAVIORAL HEALTH ASSESSMENT NOTE - HPI (INCLUDE ILLNESS QUALITY, SEVERITY, DURATION, TIMING, CONTEXT, MODIFYING FACTORS, ASSOCIATED SIGNS AND SYMPTOMS)
Patient presents resting comfortably in bed with resting hand tremor. Patient states he has been compliant with meds but has refused them at times because they give him diarrhea.  discussed with Patient  importance of medications and agrees to be compliant with care. Patient denies acute symptoms of depression or psychosis.

## 2019-03-08 NOTE — ED ADULT NURSE REASSESSMENT NOTE - NS ED NURSE REASSESS COMMENT FT1
pt allowed blood to be taken, tolerated well. pt sleeping comfortably on stretcher, easily arousable. vitals stable. will continue to monitor.

## 2019-03-08 NOTE — ED BEHAVIORAL HEALTH ASSESSMENT NOTE - RISK ASSESSMENT
Elevated Risk: Male, h/o of psychiatric illnesses, noncompliant with meds/care, although high spirituality

## 2019-03-08 NOTE — ED ADULT NURSE REASSESSMENT NOTE - NS ED NURSE REASSESS COMMENT FT1
attempted to receive blood work and gain IV access, however pt refused. pt attempting to get out of bed. MD Rod at bedside. pt calmed down and given crackers and water. pt tolerated well. will continue to monitor. attempted to receive blood work and gain IV access, however pt refused. pt attempting to get out of bed and yelling. MD [place] at bedside. pt calmed down and given crackers and water. pt tolerated well. will continue to monitor.

## 2019-03-08 NOTE — CHART NOTE - NSCHARTNOTEFT_GEN_A_CORE
XAVI Note: Spoke with Dr. Caballero for psychiatry. pt does not require inpt psychiatric tx and is cleared to return to his NH. Called [facility], spoke with admissions supervisor Susana LUIS [phone]. She is aware that pt will be returning most likely sometime this evening. XAVI is still waiting for the ED provider to clear.

## 2019-03-08 NOTE — ED BEHAVIORAL HEALTH ASSESSMENT NOTE - SUMMARY
67yo male presently showing no indicative signs of concern of harm to self or others. Labs and vitals are within normal limits. Does not meet criteria for involuntary admission.

## 2019-03-08 NOTE — ED BEHAVIORAL HEALTH ASSESSMENT NOTE - DESCRIPTION
HTN, DMII, Hyperlipidemia, hypothyroid Nursing home patient T(C): 36.6 (08 Mar 2019 06:25), Max: 36.6 (07 Mar 2019 18:13)  T(F): 97.8 (08 Mar 2019 06:25), Max: 97.9 (07 Mar 2019 18:13)  HR: 95 (08 Mar 2019 06:25) (94 - 114)  BP: 127/84 (08 Mar 2019 06:25) (126/84 - 166/99)  RR: 18 (08 Mar 2019 06:25) (18 - 20)  SpO2: 97% (08 Mar 2019 06:25) (94% - 97%)

## 2019-05-08 ENCOUNTER — EMERGENCY (EMERGENCY)
Facility: HOSPITAL | Age: 69
LOS: 1 days | Discharge: HOME | End: 2019-05-08
Attending: EMERGENCY MEDICINE
Payer: COMMERCIAL

## 2019-05-08 VITALS
DIASTOLIC BLOOD PRESSURE: 102 MMHG | SYSTOLIC BLOOD PRESSURE: 153 MMHG | RESPIRATION RATE: 18 BRPM | TEMPERATURE: 98 F | OXYGEN SATURATION: 96 % | HEART RATE: 88 BPM

## 2019-05-08 LAB
ALBUMIN SERPL ELPH-MCNC: 3.8 G/DL — SIGNIFICANT CHANGE UP (ref 3.3–5.2)
ALBUMIN SERPL ELPH-MCNC: 4 G/DL — SIGNIFICANT CHANGE UP (ref 3.3–5.2)
ALP SERPL-CCNC: 67 U/L — SIGNIFICANT CHANGE UP (ref 40–120)
ALP SERPL-CCNC: 69 U/L — SIGNIFICANT CHANGE UP (ref 40–120)
ALT FLD-CCNC: 26 U/L — SIGNIFICANT CHANGE UP
ALT FLD-CCNC: 27 U/L — SIGNIFICANT CHANGE UP
ANION GAP SERPL CALC-SCNC: 12 MMOL/L — SIGNIFICANT CHANGE UP (ref 5–17)
ANION GAP SERPL CALC-SCNC: 9 MMOL/L — SIGNIFICANT CHANGE UP (ref 5–17)
APTT BLD: 29 SEC — SIGNIFICANT CHANGE UP (ref 27.5–36.3)
AST SERPL-CCNC: 29 U/L — SIGNIFICANT CHANGE UP
AST SERPL-CCNC: 29 U/L — SIGNIFICANT CHANGE UP
BASOPHILS # BLD AUTO: 0 K/UL — SIGNIFICANT CHANGE UP (ref 0–0.2)
BASOPHILS NFR BLD AUTO: 0.4 % — SIGNIFICANT CHANGE UP (ref 0–2)
BILIRUB SERPL-MCNC: 0.3 MG/DL — LOW (ref 0.4–2)
BILIRUB SERPL-MCNC: 0.4 MG/DL — SIGNIFICANT CHANGE UP (ref 0.4–2)
BUN SERPL-MCNC: 9 MG/DL — SIGNIFICANT CHANGE UP (ref 8–20)
BUN SERPL-MCNC: 9 MG/DL — SIGNIFICANT CHANGE UP (ref 8–20)
CALCIUM SERPL-MCNC: 9.4 MG/DL — SIGNIFICANT CHANGE UP (ref 8.6–10.2)
CALCIUM SERPL-MCNC: 9.4 MG/DL — SIGNIFICANT CHANGE UP (ref 8.6–10.2)
CHLORIDE SERPL-SCNC: 102 MMOL/L — SIGNIFICANT CHANGE UP (ref 98–107)
CHLORIDE SERPL-SCNC: 102 MMOL/L — SIGNIFICANT CHANGE UP (ref 98–107)
CO2 SERPL-SCNC: 26 MMOL/L — SIGNIFICANT CHANGE UP (ref 22–29)
CO2 SERPL-SCNC: 27 MMOL/L — SIGNIFICANT CHANGE UP (ref 22–29)
CREAT SERPL-MCNC: 0.73 MG/DL — SIGNIFICANT CHANGE UP (ref 0.5–1.3)
CREAT SERPL-MCNC: 0.8 MG/DL — SIGNIFICANT CHANGE UP (ref 0.5–1.3)
EOSINOPHIL # BLD AUTO: 0.2 K/UL — SIGNIFICANT CHANGE UP (ref 0–0.5)
EOSINOPHIL NFR BLD AUTO: 3.9 % — SIGNIFICANT CHANGE UP (ref 0–5)
GLUCOSE BLDC GLUCOMTR-MCNC: 112 MG/DL — HIGH (ref 70–99)
GLUCOSE SERPL-MCNC: 139 MG/DL — HIGH (ref 70–115)
GLUCOSE SERPL-MCNC: 147 MG/DL — HIGH (ref 70–115)
HCT VFR BLD CALC: 37.3 % — LOW (ref 42–52)
HGB BLD-MCNC: 11.9 G/DL — LOW (ref 14–18)
INR BLD: 0.97 RATIO — SIGNIFICANT CHANGE UP (ref 0.88–1.16)
LYMPHOCYTES # BLD AUTO: 1.3 K/UL — SIGNIFICANT CHANGE UP (ref 1–4.8)
LYMPHOCYTES # BLD AUTO: 22.6 % — SIGNIFICANT CHANGE UP (ref 20–55)
MCHC RBC-ENTMCNC: 28.3 PG — SIGNIFICANT CHANGE UP (ref 27–31)
MCHC RBC-ENTMCNC: 31.9 G/DL — LOW (ref 32–36)
MCV RBC AUTO: 88.6 FL — SIGNIFICANT CHANGE UP (ref 80–94)
MONOCYTES # BLD AUTO: 0.5 K/UL — SIGNIFICANT CHANGE UP (ref 0–0.8)
MONOCYTES NFR BLD AUTO: 8.8 % — SIGNIFICANT CHANGE UP (ref 3–10)
NEUTROPHILS # BLD AUTO: 3.6 K/UL — SIGNIFICANT CHANGE UP (ref 1.8–8)
NEUTROPHILS NFR BLD AUTO: 63.9 % — SIGNIFICANT CHANGE UP (ref 37–73)
NT-PROBNP SERPL-SCNC: 46 PG/ML — SIGNIFICANT CHANGE UP (ref 0–300)
PLATELET # BLD AUTO: 113 K/UL — LOW (ref 150–400)
POTASSIUM SERPL-MCNC: 4.3 MMOL/L — SIGNIFICANT CHANGE UP (ref 3.5–5.3)
POTASSIUM SERPL-MCNC: 4.3 MMOL/L — SIGNIFICANT CHANGE UP (ref 3.5–5.3)
POTASSIUM SERPL-SCNC: 4.3 MMOL/L — SIGNIFICANT CHANGE UP (ref 3.5–5.3)
POTASSIUM SERPL-SCNC: 4.3 MMOL/L — SIGNIFICANT CHANGE UP (ref 3.5–5.3)
PROT SERPL-MCNC: 8 G/DL — SIGNIFICANT CHANGE UP (ref 6.6–8.7)
PROT SERPL-MCNC: 8.3 G/DL — SIGNIFICANT CHANGE UP (ref 6.6–8.7)
PROTHROM AB SERPL-ACNC: 11.2 SEC — SIGNIFICANT CHANGE UP (ref 10–12.9)
RBC # BLD: 4.21 M/UL — LOW (ref 4.6–6.2)
RBC # FLD: 13.4 % — SIGNIFICANT CHANGE UP (ref 11–15.6)
SODIUM SERPL-SCNC: 138 MMOL/L — SIGNIFICANT CHANGE UP (ref 135–145)
SODIUM SERPL-SCNC: 140 MMOL/L — SIGNIFICANT CHANGE UP (ref 135–145)
TROPONIN T SERPL-MCNC: <0.01 NG/ML — SIGNIFICANT CHANGE UP (ref 0–0.06)
WBC # BLD: 5.6 K/UL — SIGNIFICANT CHANGE UP (ref 4.8–10.8)
WBC # FLD AUTO: 5.6 K/UL — SIGNIFICANT CHANGE UP (ref 4.8–10.8)

## 2019-05-08 PROCEDURE — 99218: CPT

## 2019-05-08 PROCEDURE — 93010 ELECTROCARDIOGRAM REPORT: CPT

## 2019-05-08 PROCEDURE — 71045 X-RAY EXAM CHEST 1 VIEW: CPT | Mod: 26

## 2019-05-08 PROCEDURE — 99284 EMERGENCY DEPT VISIT MOD MDM: CPT

## 2019-05-08 RX ORDER — LEVOTHYROXINE SODIUM 125 MCG
25 TABLET ORAL DAILY
Qty: 0 | Refills: 0 | Status: DISCONTINUED | OUTPATIENT
Start: 2019-05-08 | End: 2019-05-13

## 2019-05-08 RX ORDER — NITROGLYCERIN 6.5 MG
0.4 CAPSULE, EXTENDED RELEASE ORAL ONCE
Qty: 0 | Refills: 0 | Status: COMPLETED | OUTPATIENT
Start: 2019-05-08 | End: 2019-05-08

## 2019-05-08 RX ORDER — LABETALOL HCL 100 MG
10 TABLET ORAL ONCE
Qty: 0 | Refills: 0 | Status: COMPLETED | OUTPATIENT
Start: 2019-05-08 | End: 2019-05-08

## 2019-05-08 RX ORDER — DEXTROSE 50 % IN WATER 50 %
25 SYRINGE (ML) INTRAVENOUS ONCE
Qty: 0 | Refills: 0 | Status: DISCONTINUED | OUTPATIENT
Start: 2019-05-08 | End: 2019-05-13

## 2019-05-08 RX ORDER — FLUOXETINE HCL 10 MG
10 CAPSULE ORAL DAILY
Qty: 0 | Refills: 0 | Status: DISCONTINUED | OUTPATIENT
Start: 2019-05-08 | End: 2019-05-13

## 2019-05-08 RX ORDER — METFORMIN HYDROCHLORIDE 850 MG/1
1000 TABLET ORAL DAILY
Qty: 0 | Refills: 0 | Status: DISCONTINUED | OUTPATIENT
Start: 2019-05-08 | End: 2019-05-13

## 2019-05-08 RX ORDER — QUETIAPINE FUMARATE 200 MG/1
100 TABLET, FILM COATED ORAL AT BEDTIME
Qty: 0 | Refills: 0 | Status: DISCONTINUED | OUTPATIENT
Start: 2019-05-08 | End: 2019-05-13

## 2019-05-08 RX ORDER — METOPROLOL TARTRATE 50 MG
50 TABLET ORAL ONCE
Qty: 0 | Refills: 0 | Status: COMPLETED | OUTPATIENT
Start: 2019-05-09 | End: 2019-05-09

## 2019-05-08 RX ORDER — INSULIN LISPRO 100/ML
VIAL (ML) SUBCUTANEOUS
Qty: 0 | Refills: 0 | Status: DISCONTINUED | OUTPATIENT
Start: 2019-05-08 | End: 2019-05-13

## 2019-05-08 RX ORDER — SIMVASTATIN 20 MG/1
20 TABLET, FILM COATED ORAL AT BEDTIME
Qty: 0 | Refills: 0 | Status: DISCONTINUED | OUTPATIENT
Start: 2019-05-08 | End: 2019-05-13

## 2019-05-08 RX ORDER — DIVALPROEX SODIUM 500 MG/1
500 TABLET, DELAYED RELEASE ORAL DAILY
Qty: 0 | Refills: 0 | Status: DISCONTINUED | OUTPATIENT
Start: 2019-05-08 | End: 2019-05-13

## 2019-05-08 RX ORDER — DEXTROSE 50 % IN WATER 50 %
15 SYRINGE (ML) INTRAVENOUS ONCE
Qty: 0 | Refills: 0 | Status: DISCONTINUED | OUTPATIENT
Start: 2019-05-08 | End: 2019-05-13

## 2019-05-08 RX ORDER — ASPIRIN/CALCIUM CARB/MAGNESIUM 324 MG
325 TABLET ORAL ONCE
Qty: 0 | Refills: 0 | Status: COMPLETED | OUTPATIENT
Start: 2019-05-08 | End: 2019-05-08

## 2019-05-08 RX ORDER — ASPIRIN/CALCIUM CARB/MAGNESIUM 324 MG
81 TABLET ORAL DAILY
Qty: 0 | Refills: 0 | Status: DISCONTINUED | OUTPATIENT
Start: 2019-05-08 | End: 2019-05-13

## 2019-05-08 RX ORDER — GLUCAGON INJECTION, SOLUTION 0.5 MG/.1ML
1 INJECTION, SOLUTION SUBCUTANEOUS ONCE
Qty: 0 | Refills: 0 | Status: DISCONTINUED | OUTPATIENT
Start: 2019-05-08 | End: 2019-05-13

## 2019-05-08 RX ORDER — SODIUM CHLORIDE 9 MG/ML
1000 INJECTION, SOLUTION INTRAVENOUS
Qty: 0 | Refills: 0 | Status: DISCONTINUED | OUTPATIENT
Start: 2019-05-08 | End: 2019-05-13

## 2019-05-08 RX ORDER — DEXTROSE 50 % IN WATER 50 %
12.5 SYRINGE (ML) INTRAVENOUS ONCE
Qty: 0 | Refills: 0 | Status: DISCONTINUED | OUTPATIENT
Start: 2019-05-08 | End: 2019-05-13

## 2019-05-08 RX ORDER — DIAZEPAM 5 MG
5 TABLET ORAL ONCE
Qty: 0 | Refills: 0 | Status: DISCONTINUED | OUTPATIENT
Start: 2019-05-08 | End: 2019-05-08

## 2019-05-08 RX ADMIN — DIVALPROEX SODIUM 500 MILLIGRAM(S): 500 TABLET, DELAYED RELEASE ORAL at 23:33

## 2019-05-08 RX ADMIN — Medication 325 MILLIGRAM(S): at 23:33

## 2019-05-08 RX ADMIN — Medication 10 MILLIGRAM(S): at 23:34

## 2019-05-08 RX ADMIN — SIMVASTATIN 20 MILLIGRAM(S): 20 TABLET, FILM COATED ORAL at 23:34

## 2019-05-08 RX ADMIN — Medication 5 MILLIGRAM(S): at 23:39

## 2019-05-08 RX ADMIN — METFORMIN HYDROCHLORIDE 1000 MILLIGRAM(S): 850 TABLET ORAL at 23:34

## 2019-05-08 RX ADMIN — QUETIAPINE FUMARATE 100 MILLIGRAM(S): 200 TABLET, FILM COATED ORAL at 23:34

## 2019-05-08 NOTE — ED CDU PROVIDER INITIAL DAY NOTE - OBJECTIVE STATEMENT
Pt is a 67 yo M co chest pain. PMHx significant for htn, hld, dm, cardiac arrest?. Pt states that this morning he began to have intermittent mid-sternal non-radiating chest pain. Pt states that the chest pain will come on and last 1-2 h and resolve spontaneously. Pt currently complaining of 7/10 chest pain. Pt denies substance abuse. No sob. no n/v. no fever/chills. no other complaints. PT comes from NH.   no cards

## 2019-05-08 NOTE — ED PROVIDER NOTE - OBJECTIVE STATEMENT
Pt is a 69 yo M co chest pain.  PMHx significant for htn, hld, dm. pt states that this morning he began to have intermittent mid-sternal nonradiating chest pain. Pt states that the chest pain will come on and last 1-2 h and resolve spontaneously. Pt states that there are no modifying factors. no sob. no n/v. no fever/chills. no other complaints.

## 2019-05-08 NOTE — CONSULT NOTE ADULT - SUBJECTIVE AND OBJECTIVE BOX
[facility]                                                        Office: [address] [place] [address]                                                       Telephone: [phone]. Fax:[phone]    Patient is a 68y old  Male who presents with a chief complaint of chest pain    HPI: 69 y/o male with insulin dependent diabetes mellitus, hypertension, hypothyroid,  hyperlipidemia presents, bipolar disorder, presents with 1 day hx of acute onset chest pain initially noted upon getting up from bed this morning.  8/10 in severity, epigastric to left sided a/w nausea, diaphoresis, dizziness, constant throughout the day without any provocative or alleviating factors.  Also reports some hematuria.  Last ischemia evaluation was a negative stress test in 2015.    BNP normal.  First set of cardiac enzymes negative.  ECG without acute ischemic changes.       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  cholecystectomy      Allergies    Cipro (Unknown)  penicillins (Unknown)    Intolerances        Home Medications:  aspirin 81 mg oral tablet, chewable: 1 tab(s) orally once a day (07 Mar 2019 18:55)  brimonidine 0.2% ophthalmic solution: 1 drop(s) to each eye 2 times a day (07 Mar 2019 18:55)  divalproex sodium 500 mg oral tablet, extended release: 1 tab(s) orally once a day at 10am (07 Mar 2019 18:55)  divalproex sodium 500 mg oral tablet, extended release: 2 tab(s) orally once a day in the evening (07 Mar 2019 18:55)  FLUoxetine 10 mg oral capsule: 1 cap(s) orally once a day (07 Mar 2019 18:55)  insulin glargine: Inject 50 unit(s) subcutaneous once a day (at bedtime) (07 Mar 2019 18:55)  Januvia 100 mg oral tablet: 1 tab(s) orally once a day (07 Mar 2019 18:55)  latanoprost 0.005% ophthalmic solution: 1 drop(s) to each eye once a day (in the evening) (07 Mar 2019 18:55)  levothyroxine 25 mcg (0.025 mg) oral tablet: 1 tab(s) orally once a day (07 Mar 2019 18:55)  lithium carbonate: 150 cap(s) orally 2 times a day (07 Mar 2019 18:55)  metFORMIN 1000 mg oral tablet: 1 tab(s) orally 2 times a day (07 Mar 2019 18:55)  metoprolol tartrate 50 mg oral tablet: 1 tab(s) orally 2 times a day (07 Mar 2019 18:55)  QUEtiapine 100 mg oral tablet: 1 tab(s) orally once a day (at bedtime) (07 Mar 2019 18:55)  simvastatin 20 mg oral tablet: 1 tab(s) orally once a day (at bedtime) (07 Mar 2019 18:55)  Trulicity Pen 1.5 mg/0.5 mL subcutaneous solution: Inject once weekly (07 Mar 2019 18:55)      MEDICATIONS  (STANDING):  aspirin 325 milliGRAM(s) Oral once  nitroglycerin     SubLingual 0.4 milliGRAM(s) SubLingual once    MEDICATIONS  (PRN):      FAMILY HISTORY:  No pertinent family history in first degree relatives      SOCIAL HISTORY: No tobacco/ No etoh/ No illicit drug use    PREVIOUS DIAGNOSTIC TESTING:      ECHO FINDINGS: < from: TTE Echo Complete w/Doppler (03.07.18 @ 13:13) >  Summary:   1.Technically good study.   2. Endocardial visualization was enhanced with intravenous echo contrast.   3. Normal global left ventricular systolic function.   4. Left ventricular ejection fraction, by visual estimation, is 65 to   70%.   5. Spectral Doppler shows impaired relaxation pattern of left   ventricular myocardial filling (Grade I diastolic dysfunction).   6. Normal right ventricular size and function.   7. Sclerotic aortic valve with normal opening.   8. Trace mitral valve regurgitation.   9. There is no evidence of pericardial effusion.    MD Geeta Electronically signed on 3/7/2018 at 3:33:48 PM       < end of copied text >    STRESS FINDINGS: < from: Nuclear Stress Test-Pharmacologic (07.28.15 @ 07:07) >  IMPRESSIONS:Normal Study  Inability to exercise due to decrease exercise capacity.  No Symptom. No diagnostic ECG changes.  The left ventricle was normal LV size.  No ischemia/infarction.  Gated wall motion analysis shows normal wall motion with  LVEF of 65%.  ------------------------------------------------------------------------      ------------------------------------------------------------------------    Confirmed on  7/28/2015 - 13:17:04 by Philomena Quispe MD    Cardiology Fellow: WILLIAM Newton    < end of copied text >    CATHETERIZATION FINDINGS:    REVIEW OF SYSTEMS:  CONSTITUTIONAL: [-]fever   [-] weight loss   [-] fatigue  EYES: [-]  eye pain   [-] visual disturbances      [-]  discharge  ENMT:  [-]  difficulty hearing,   [-]  tinnitus   [-] vertigo    [-]  sinus or throat pain  NECK: [-]  pain or stiffness  RESPIRATORY: [-]  cough 	[-] wheezing 	[-]  hemoptysis 		[-]   Shortness of Breath  CARDIOVASCULAR: [-]  chest pain	[-] palpitations		[-]  passing out 		[-] dizziness 	[-]  leg swelling  		[-]  PND 	[-] orthopnea  GASTROINTESTINAL: [-]  abdominal pain		[-]nausea	[-] vomiting	[-]  hematemesis 	[-]  diarrhea  	[-] constipation 		[-]  melena 	[-] hematochezia.  GENITOURINARY: [-] dysuria	[-] frequency	[-] hematuria	[-]  incontinence  NEUROLOGICAL: [-]  headaches		[-] memory loss 	[-]  loss of strength  			[-]  numbness/tingling 	[-]  tremors  SKIN: [-]  itching 	[-] rashes 	[-]  lesions   LYMPH Nodes: [-] enlarged glands  ENDOCRINE: [-] heat or cold intolerance 	[-]   hair loss  MUSCULOSKELETAL: [-] joint pain  [-] joint swelling	[-]  muscle, back, or extremity pain  PSYCHIATRIC: [-]  depression	[-] anxiety	[-] mood swings		[-]  difficulty sleeping   HEME: [-]  easy bruising 	[-]  bleeding   ALLERY AND IMMUNOLOGIC: [-]  hives or eczema	      Vital Signs Last 24 Hrs  T(C): 36.9 (08 May 2019 14:52), Max: 36.9 (08 May 2019 14:52)  T(F): 98.4 (08 May 2019 14:52), Max: 98.4 (08 May 2019 14:52)  HR: 96 (08 May 2019 17:31) (88 - 96)  BP: 201/99 (08 May 2019 17:31) (153/102 - 201/99)  BP(mean): --  RR: 18 (08 May 2019 14:52) (18 - 18)  SpO2: 96% (08 May 2019 14:52) (96% - 96%)  Daily     Daily   I&O's Detail        PHYSICAL EXAM:  Appearance: Normal, well nourished, NAD	  HEENT:   Normal oral mucosa, PERRL, EOMI, sclera non-icteric	  Lymphatic: No cervical lymphadenopathy  Cardiovascular: Normal S1 S2, No JVD, No cardiac murmurs, No carotid bruits, No peripheral edema  Respiratory: Lungs clear to auscultation	  Psychiatry: A & O x 3, Mood & affect appropriate  Gastrointestinal:  Soft, Non-tender, + BS, no bruits	  Skin: No rashes, No ecchymoses, No cyanosis, Dry  Neurologic: Grossly non-focal with full strength in all four extremities; tremors.   Extremities: Normal range of motion, No clubbing, cyanosis or edema  Vascular: Peripheral pulses palpable 2+ bilaterally, warm    INTERPRETATION OF TELEMETRY: sinus rhythm    ECG (tracing reviewed by me): SR 89 bpm, no acute ST-T abnormalities    LABS:                        11.9   5.6   )-----------( 113      ( 08 May 2019 15:18 )             37.3     05-08    140  |  102  |  9.0  ----------------------------<  147<H>  4.3   |  26.0  |  0.80    Ca    9.4      08 May 2019 15:18  Mg     1.9     05-08    TPro  8.0  /  Alb  3.8  /  TBili  0.3<L>  /  DBili  x   /  AST  29  /  ALT  26  /  AlkPhos  67  05-08    CARDIAC MARKERS ( 08 May 2019 15:18 )  x     / <0.01 ng/mL / x     / x     / x          PT/INR - ( 08 May 2019 15:50 )   PT: 11.2 sec;   INR: 0.97 ratio         PTT - ( 08 May 2019 15:50 )  PTT:29.0 sec    BNPSerum Pro-Brain Natriuretic Peptide: 46 pg/mL (05-08 @ 15:50)      RADIOLOGY & ADDITIONAL STUDIES:  CXR pending

## 2019-05-08 NOTE — ED ADULT NURSE NOTE - CAS EDN DISCHARGE ASSESSMENT
Awake/Alert and oriented to person, place and time/Dressing clean and dry/Patient baseline mental status

## 2019-05-08 NOTE — ED CDU PROVIDER INITIAL DAY NOTE - ATTENDING CONTRIBUTION TO CARE
I agree with the PA's note and was available for any issues/concerns. I was directly involved in patient care. My brief overall assessment is as follows: chest pain, active, no sob, cardiac risk factors and ?history, ekg and trops neg, serial trops and caridology consult. ctab, rrr, no significant edema, non toxic.

## 2019-05-08 NOTE — ED ADULT NURSE NOTE - NSIMPLEMENTINTERV_GEN_ALL_ED
Implemented All Fall Risk Interventions:  [place] to call system. Call bell, personal items and telephone within reach. Instruct patient to call for assistance. Room bathroom lighting operational. Non-slip footwear when patient is off stretcher. Physically safe environment: no spills, clutter or unnecessary equipment. Stretcher in lowest position, wheels locked, appropriate side rails in place. Provide visual cue, wrist band, yellow gown, etc. Monitor gait and stability. Monitor for mental status changes and reorient to person, place, and time. Review medications for side effects contributing to fall risk. Reinforce activity limits and safety measures with patient and family.

## 2019-05-08 NOTE — ED ADULT NURSE NOTE - NEURO BEHAVIOR
-At risk for malabsorption of vitamins/minerals secondary to malabsorption and restriction of intake from their procedure  -Currently taking adequate postop bariatric surgery vitamin supplementation-yes  -Last set of bariatric labs completed on 12/17 and are not within acceptable limits   -Next set of bariatric labs ordered for approximately  1 month    Taking 4 fusion, one calcium  And an Extra 2000 IU vitamin D3
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  Overall doing Fair  He actually gaine to 260 lb and then lost back to 250 lb since his 6 month visit    Initial:334 4 lb  Current: 250 lb  EWL: 52% -which is below average but considering his higher starting bmi this is as expected  Hussein: 235  lb  Current BMI is Body mass index is 36 18 kg/m²      Tolerating a regular diet-yes  Eating at least 60 grams of protein per day-yes  Following 30/60 minute rule with liquids-yes  Drinking at least 64 ounces of fluid per day-close-advised on importance and ways to boost his fluids -especially with hot weather  Drinking carbonated beverages-yes was but cut this out again  Sufficient exercise-yes  Using NSAIDs regularly-no  Using nicotine-no  Using alcohol-yes/a glass every 2-3 weeks/trini  Advised on alcohol and advised to abstain
By last labs    He has added an EXTRA 2000 IU vitamin D3 daily-this will be repeated with his routine labs
cooperative/calm

## 2019-05-08 NOTE — ED PROVIDER NOTE - CLINICAL SUMMARY MEDICAL DECISION MAKING FREE TEXT BOX
labs and imaging reviewed. Pt with atypical chest pain. will put in obs for further serial enzymes and cardio consult.

## 2019-05-08 NOTE — CONSULT NOTE ADULT - ASSESSMENT
69 y/o male with insulin dependent diabetes mellitus, hypertension, hyperlipidemia presents, bipolar disorder, hypothyroid presents with 1 day hx of acute onset chest pain initially noted upon getting up from bed this morning.  8/10 in severity, epigastric to left sided a/w nausea, diaphoresis, dizziness, constant throughout the day without any provocative or alleviating factors.  Also reports some hematuria.  Last ischemia evaluation was a negative stress test in 2015.    BNP normal.  First set of cardiac enzymes negative.  ECG without acute ischemic changes.     # Atypical chest pain in a patient with multiple risk factors for obstructive coronary artery disease - for TTE and pharmacological stress testing in am.  NPO after midnight.    # hypertension - controlled on current regimen  # hyperlipidemia - resume statin therapy  # diabetes mellitus - resume insulin  # Hep SQ or LMWH for DVT prophylaxis      Thank you for allowing me to participate in care of your patient.   Will follow  D/W Dr. Walters

## 2019-05-09 VITALS — SYSTOLIC BLOOD PRESSURE: 172 MMHG | DIASTOLIC BLOOD PRESSURE: 108 MMHG

## 2019-05-09 LAB
BASOPHILS # BLD AUTO: 0 K/UL — SIGNIFICANT CHANGE UP (ref 0–0.2)
BASOPHILS NFR BLD AUTO: 0.2 % — SIGNIFICANT CHANGE UP (ref 0–2)
EOSINOPHIL # BLD AUTO: 0.2 K/UL — SIGNIFICANT CHANGE UP (ref 0–0.5)
EOSINOPHIL NFR BLD AUTO: 3.8 % — SIGNIFICANT CHANGE UP (ref 0–5)
GLUCOSE BLDC GLUCOMTR-MCNC: 114 MG/DL — HIGH (ref 70–99)
GLUCOSE BLDC GLUCOMTR-MCNC: 467 MG/DL — CRITICAL HIGH (ref 70–99)
HBA1C BLD-MCNC: 5 % — SIGNIFICANT CHANGE UP (ref 4–5.6)
HCT VFR BLD CALC: 38.6 % — LOW (ref 42–52)
HGB BLD-MCNC: 12.5 G/DL — LOW (ref 14–18)
LYMPHOCYTES # BLD AUTO: 1.4 K/UL — SIGNIFICANT CHANGE UP (ref 1–4.8)
LYMPHOCYTES # BLD AUTO: 24.8 % — SIGNIFICANT CHANGE UP (ref 20–55)
MCHC RBC-ENTMCNC: 28.4 PG — SIGNIFICANT CHANGE UP (ref 27–31)
MCHC RBC-ENTMCNC: 32.4 G/DL — SIGNIFICANT CHANGE UP (ref 32–36)
MCV RBC AUTO: 87.7 FL — SIGNIFICANT CHANGE UP (ref 80–94)
MONOCYTES # BLD AUTO: 0.5 K/UL — SIGNIFICANT CHANGE UP (ref 0–0.8)
MONOCYTES NFR BLD AUTO: 9.2 % — SIGNIFICANT CHANGE UP (ref 3–10)
NEUTROPHILS # BLD AUTO: 3.5 K/UL — SIGNIFICANT CHANGE UP (ref 1.8–8)
NEUTROPHILS NFR BLD AUTO: 61.5 % — SIGNIFICANT CHANGE UP (ref 37–73)
PLATELET # BLD AUTO: 106 K/UL — LOW (ref 150–400)
RBC # BLD: 4.4 M/UL — LOW (ref 4.6–6.2)
RBC # FLD: 13.4 % — SIGNIFICANT CHANGE UP (ref 11–15.6)
TROPONIN T SERPL-MCNC: <0.01 NG/ML — SIGNIFICANT CHANGE UP (ref 0–0.06)
WBC # BLD: 5.8 K/UL — SIGNIFICANT CHANGE UP (ref 4.8–10.8)
WBC # FLD AUTO: 5.8 K/UL — SIGNIFICANT CHANGE UP (ref 4.8–10.8)

## 2019-05-09 PROCEDURE — 83036 HEMOGLOBIN GLYCOSYLATED A1C: CPT

## 2019-05-09 PROCEDURE — 93005 ELECTROCARDIOGRAM TRACING: CPT

## 2019-05-09 PROCEDURE — 83735 ASSAY OF MAGNESIUM: CPT

## 2019-05-09 PROCEDURE — A9500: CPT

## 2019-05-09 PROCEDURE — 85730 THROMBOPLASTIN TIME PARTIAL: CPT

## 2019-05-09 PROCEDURE — 83880 ASSAY OF NATRIURETIC PEPTIDE: CPT

## 2019-05-09 PROCEDURE — 99284 EMERGENCY DEPT VISIT MOD MDM: CPT | Mod: 25

## 2019-05-09 PROCEDURE — 93306 TTE W/DOPPLER COMPLETE: CPT | Mod: 26

## 2019-05-09 PROCEDURE — 99217: CPT

## 2019-05-09 PROCEDURE — 93016 CV STRESS TEST SUPVJ ONLY: CPT

## 2019-05-09 PROCEDURE — 96374 THER/PROPH/DIAG INJ IV PUSH: CPT | Mod: XU

## 2019-05-09 PROCEDURE — 71045 X-RAY EXAM CHEST 1 VIEW: CPT

## 2019-05-09 PROCEDURE — 99283 EMERGENCY DEPT VISIT LOW MDM: CPT

## 2019-05-09 PROCEDURE — 78452 HT MUSCLE IMAGE SPECT MULT: CPT

## 2019-05-09 PROCEDURE — 85027 COMPLETE CBC AUTOMATED: CPT

## 2019-05-09 PROCEDURE — 82962 GLUCOSE BLOOD TEST: CPT

## 2019-05-09 PROCEDURE — 93018 CV STRESS TEST I&R ONLY: CPT

## 2019-05-09 PROCEDURE — 93306 TTE W/DOPPLER COMPLETE: CPT

## 2019-05-09 PROCEDURE — 80053 COMPREHEN METABOLIC PANEL: CPT

## 2019-05-09 PROCEDURE — 93017 CV STRESS TEST TRACING ONLY: CPT

## 2019-05-09 PROCEDURE — 78452 HT MUSCLE IMAGE SPECT MULT: CPT | Mod: 26

## 2019-05-09 PROCEDURE — 36415 COLL VENOUS BLD VENIPUNCTURE: CPT

## 2019-05-09 PROCEDURE — 84484 ASSAY OF TROPONIN QUANT: CPT

## 2019-05-09 PROCEDURE — G0378: CPT

## 2019-05-09 PROCEDURE — 85610 PROTHROMBIN TIME: CPT

## 2019-05-09 RX ORDER — METOPROLOL TARTRATE 50 MG
100 TABLET ORAL ONCE
Refills: 0 | Status: COMPLETED | OUTPATIENT
Start: 2019-05-09 | End: 2019-05-09

## 2019-05-09 RX ORDER — INSULIN HUMAN 100 [IU]/ML
6 INJECTION, SOLUTION SUBCUTANEOUS ONCE
Refills: 0 | Status: COMPLETED | OUTPATIENT
Start: 2019-05-09 | End: 2019-05-09

## 2019-05-09 RX ADMIN — Medication 6: at 08:06

## 2019-05-09 RX ADMIN — Medication 100 MILLIGRAM(S): at 15:14

## 2019-05-09 RX ADMIN — Medication 10 MILLIGRAM(S): at 13:40

## 2019-05-09 RX ADMIN — Medication 25 MICROGRAM(S): at 05:22

## 2019-05-09 RX ADMIN — Medication 81 MILLIGRAM(S): at 13:40

## 2019-05-09 RX ADMIN — DIVALPROEX SODIUM 500 MILLIGRAM(S): 500 TABLET, DELAYED RELEASE ORAL at 13:40

## 2019-05-09 RX ADMIN — INSULIN HUMAN 6 UNIT(S): 100 INJECTION, SOLUTION SUBCUTANEOUS at 08:57

## 2019-05-09 RX ADMIN — METFORMIN HYDROCHLORIDE 1000 MILLIGRAM(S): 850 TABLET ORAL at 13:40

## 2019-05-09 NOTE — CHART NOTE - NSCHARTNOTEFT_GEN_A_CORE
SOCIAL WORK NOTE:  THIS WORKER WAS NOTIFIED THAT PATIENT CAN RETURN TO LONG TERM PLACEMENT AT [facility] AFTER NUCLEAR STRESS TEST RESULTS.  ALL PAPERWORK FORWARDED TO [facility]IA [facility].  THEY REVIEWED AND ACCEPTED THE PATIENT BACK FOR LATER TODAY.  MADE THEM AWARE OF THE CARIOLOGY APPOINTMENT THAT PATIENT HAS TO ATTEND ON MONDAY 5/13/2019 AT 9:45 AM WITH [facility].  NEAF FORM LOADED INTO [facility] FOR ABILITY TO ARRANGE AMBULANCE LATER THIS EVENING.

## 2019-05-09 NOTE — PROGRESS NOTE ADULT - ASSESSMENT
67 y/o male with insulin dependent diabetes mellitus, hypertension, hyperlipidemia presents, bipolar disorder, hypothyroid presents with 1 day hx of acute onset chest pain initially noted upon getting up from bed this morning.  8/10 in severity, epigastric to left sided a/w nausea, diaphoresis, dizziness, constant throughout the day without any provocative or alleviating factors.  Also reports some hematuria.  Last ischemia evaluation was a negative stress test in 2015.    BNP normal.  First set of cardiac enzymes negative.  ECG without acute ischemic changes.     # Atypical chest pain in a patient with multiple risk factors for obstructive coronary artery disease- await nuc stress results  # hypertension - controlled on current regimen  # hyperlipidemia - resume statin therapy  # diabetes mellitus - resume insulin  # Hep SQ or LMWH for DVT prophylaxis      Thank you for allowing me to participate in care of your patient.   Further recommendations pending results of stress test  Please call with questions

## 2019-05-09 NOTE — ED ADULT NURSE REASSESSMENT NOTE - NS ED NURSE REASSESS COMMENT FT1
Pt alert and oriented, no apparent distress noted at this time. Pt handed off to nel RANDOLPH in stable condition.
Pt at testing. Will reassess upon return to unit.
assumed care of pt @ 1930, report received from govind RANDOLPH, charting as noted. Pt AOx3, pt appears anxious c/o chest pain at this time and requesting sleeping medication. BP originally elevated 201/99, BP reassessed 160/94. Per PA hold off on labetalol order at this time and give pt home PO antihypertensive medications. awaiting orders. HR is NSR on cardiac monitor, lung sounds are clear b/l, abd is soft and nontender with positive bowel sounds in all four quadrants, skin is warm, dry and appropriate for age and race. pt educated on plan of care and observation stay. Plan of care taught back to RN. Proficiency determined from successful pt teach back. Pt oriented to unit, staff, and room. Pt reeducated on call bell use. Bed locked in lowest position, call bell within reach. All questions and concerns addressed.     3rd troponin @ 2130. Pt educated on plan of care, pt requires frequent reeducation on plan of care. RN rounding frequently for pt safety. Much emotional support provided.
pt requesting food periodically, pt educated continuously throughout night of NPO diet. Pt left unit and consumed one doughnut @ 0500 despite NPO diet. PA made aware.
verbal report rec'd from ED RN Loida. BP elevated 200s/100s, PA aware, meds ordered. pt moved to CDU for observation.
Pt was repeatedly educated on NPO diet. Pt insisting on leaving hospital if not provided food. As per PA, meal provided against medical advice.
Pt maintained on NPO diet, awaiting radiology results, updated on plan of care, will continue to monitor.
pt sleeping in stretcher in NAD, NSR on cardiac monitor, O2 sat WNL breathing easy and unlabored. Pt reeducated on NPO diet.

## 2019-05-09 NOTE — ED CDU PROVIDER SUBSEQUENT DAY NOTE - ATTENDING CONTRIBUTION TO CARE
I agree with the PA's note and was available for any issues/concerns. I was directly involved in patient care. My brief overall assessment is as follows: trops neg, no acute overnight events, to get stress test in morning.

## 2019-05-09 NOTE — ED ADULT NURSE REASSESSMENT NOTE - ANCILLARY STATUS
radiology results pending
radiology results pending
awaiting echo and stress test/awaiting radiology
radiology results pending

## 2019-05-09 NOTE — PROGRESS NOTE ADULT - SUBJECTIVE AND OBJECTIVE BOX
[facility]                                                        Office: [address] [address]                                                       Telephone: [phone]. Fax:[phone]      CARDIOLOGY PROGRESS NOTE   ([facility])    Subjective:    ROS: No headache, no chest pain, no SOB, no palpitations, no dizziness, no nausea, no bleeding    Chronic Conditions:     CURRENT MEDICATIONS        diVALproex  milliGRAM(s) Oral daily  FLUoxetine 10 milliGRAM(s) Oral daily  QUEtiapine 100 milliGRAM(s) Oral at bedtime      dextrose 40% Gel 15 Gram(s) Oral once PRN  dextrose 50% Injectable 12.5 Gram(s) IV Push once  dextrose 50% Injectable 25 Gram(s) IV Push once  dextrose 50% Injectable 25 Gram(s) IV Push once  glucagon  Injectable 1 milliGRAM(s) IntraMuscular once PRN  insulin lispro (HumaLOG) corrective regimen sliding scale   SubCutaneous three times a day before meals  levothyroxine 25 MICROGram(s) Oral daily  metFORMIN 1000 milliGRAM(s) Oral daily  simvastatin 20 milliGRAM(s) Oral at bedtime    aspirin  chewable 81 milliGRAM(s) Oral daily  dextrose 5%. 1000 milliLiter(s) IV Continuous <Continuous>    	  TELEMETRY:   Vitals:  T(C): 36.6 (05-09-19 @ 08:09), Max: 36.9 (05-08-19 @ 14:52)  HR: 78 (05-09-19 @ 08:09) (78 - 96)  BP: 173/114 (05-09-19 @ 08:09) (140/100 - 201/99)  RR: 18 (05-09-19 @ 08:09) (17 - 19)  SpO2: 99% (05-09-19 @ 08:09) (95% - 99%)  Wt(kg): --  I&O's Summary    Height (cm): 173.99 (05-08 @ 19:52)  Weight (kg): 111.11547087980319 (05-08 @ 19:52)  BMI (kg/m2): 36.9 (05-08 @ 19:52)  BSA (m2): 2.24 (05-08 @ 19:52)  Daily T(C): 36.6 (05-09-19 @ 08:09), Max: 36.9 (05-08-19 @ 14:52)  HR: 78 (05-09-19 @ 08:09) (78 - 96)  BP: 173/114 (05-09-19 @ 08:09) (140/100 - 201/99)  RR: 18 (05-09-19 @ 08:09) (17 - 19)  SpO2: 99% (05-09-19 @ 08:09) (95% - 99%)  Wt(kg): --    Daily Height (cm): 173.99 (05-08 @ 19:52)  Weight (kg): 111.76219426970363 (05-08 @ 19:52)  BMI (kg/m2): 36.9 (05-08 @ 19:52)  BSA (m2): 2.24 (05-08 @ 19:52)    PHYSICAL EXAM:  Appearance: Normal	  HEENT:   Normal oral mucosa, PERRL, EOMI	  Lymphatic: No lymphadenopathy  Cardiovascular: Normal S1 S2, No JVD, No murmurs, No edema  Respiratory: Lungs clear to auscultation	  Psychiatry: A & O x 3, Mood & affect appropriate  Gastrointestinal:  Soft, Non-tender, + BS	  Skin: No rashes, No ecchymoses, No cyanosis  Neurologic: Non-focal  Extremities: Normal range of motion, No clubbing, cyanosis or edema  Vascular: Peripheral pulses palpable 2+ bilaterally, warm      ECG (tracing reviewed by me):  	    DIAGNOSTIC TESTING:  Echocardiogram (images reviewed by me):  Catheterization:  Stress Test:    CXR (image reviewed by me):  OTHER: 	    LABS:	 	  CARDIAC MARKERS:                                  12.5   5.8   )-----------( 106      ( 09 May 2019 02:21 )             38.6     05-08    138  |  102  |  9.0  ----------------------------<  139<H>  4.3   |  27.0  |  0.73    Ca    9.4      08 May 2019 19:12  Mg     1.9     05-08    TPro  8.3  /  Alb  4.0  /  TBili  0.4  /  DBili  x   /  AST  29  /  ALT  27  /  AlkPhos  69  05-08    BNP: Serum Pro-Brain Natriuretic Peptide: 46 pg/mL (05-08 @ 15:50)    Lipid Profile:   HgA1c: Hemoglobin A1C, Whole Blood: 5.0 % (05-09 @ 06:12)    TSH: [facility]                                                        Office: [address] [place] [address]                                                       Telephone: [phone]. Fax:[phone]      CARDIOLOGY PROGRESS NOTE   ([facility])    Subjective: Patient seen and examined.  Feels better, denies any further chest pain.  Per note, discovered to eat a donut overnight although supposed to have been NPO.  In addition, found to be wandering the hallway, went to drink water from the fountain despite knowing NPO status. Already received resting nuclear images.     ROS: No headache, no chest pain, no SOB, no palpitations, no dizziness, no nausea, no bleeding    Chronic Conditions:     CURRENT MEDICATIONS        diVALproex  milliGRAM(s) Oral daily  FLUoxetine 10 milliGRAM(s) Oral daily  QUEtiapine 100 milliGRAM(s) Oral at bedtime      dextrose 40% Gel 15 Gram(s) Oral once PRN  dextrose 50% Injectable 12.5 Gram(s) IV Push once  dextrose 50% Injectable 25 Gram(s) IV Push once  dextrose 50% Injectable 25 Gram(s) IV Push once  glucagon  Injectable 1 milliGRAM(s) IntraMuscular once PRN  insulin lispro (HumaLOG) corrective regimen sliding scale   SubCutaneous three times a day before meals  levothyroxine 25 MICROGram(s) Oral daily  metFORMIN 1000 milliGRAM(s) Oral daily  simvastatin 20 milliGRAM(s) Oral at bedtime    aspirin  chewable 81 milliGRAM(s) Oral daily  dextrose 5%. 1000 milliLiter(s) IV Continuous <Continuous>    	  TELEMETRY: SR, lots of artifact from tremor  Vitals:  T(C): 36.6 (05-09-19 @ 08:09), Max: 36.9 (05-08-19 @ 14:52)  HR: 78 (05-09-19 @ 08:09) (78 - 96)  BP: 173/114 (05-09-19 @ 08:09) (140/100 - 201/99)  RR: 18 (05-09-19 @ 08:09) (17 - 19)  SpO2: 99% (05-09-19 @ 08:09) (95% - 99%)  Wt(kg): --  I&O's Summary    Height (cm): 173.99 (05-08 @ 19:52)  Weight (kg): 111.87580349941707 (05-08 @ 19:52)  BMI (kg/m2): 36.9 (05-08 @ 19:52)  BSA (m2): 2.24 (05-08 @ 19:52)  Daily T(C): 36.6 (05-09-19 @ 08:09), Max: 36.9 (05-08-19 @ 14:52)  HR: 78 (05-09-19 @ 08:09) (78 - 96)  BP: 173/114 (05-09-19 @ 08:09) (140/100 - 201/99)  RR: 18 (05-09-19 @ 08:09) (17 - 19)  SpO2: 99% (05-09-19 @ 08:09) (95% - 99%)  Wt(kg): --    Daily Height (cm): 173.99 (05-08 @ 19:52)  Weight (kg): 111.93325784161621 (05-08 @ 19:52)  BMI (kg/m2): 36.9 (05-08 @ 19:52)  BSA (m2): 2.24 (05-08 @ 19:52)    PHYSICAL EXAM:  Appearance: NAD, central obesity	  HEENT:   Normal oral mucosa, PERRL, EOMI	  Lymphatic: No lymphadenopathy  Cardiovascular: Normal S1 S2, No JVD, No murmurs, trace bilateral LE edema  Respiratory: Lungs clear to auscultation	  Psychiatry: A & O x 3, Mood & affect appropriate  Gastrointestinal:  Soft, Non-tender, + BS	  Skin: No rashes, No ecchymoses, No cyanosis  Neurologic: Non-focal  Extremities: Normal range of motion, No clubbing, cyanosis or edema  Vascular: Peripheral pulses palpable 2+ bilaterally, warm      ECG (tracing reviewed by me):  	    DIAGNOSTIC TESTING:  Echocardiogram (images reviewed by me): < from: TTE Echo Complete w/Doppler (05.09.19 @ 08:32) >    Summary:   1. Technically difficult study.   2. Hyperdynamic global left ventricular systolic function.   3. Left ventricular ejection fraction, by visual estimation, is 70 to   75%.   4. Normal right ventricular size and function.   5. The left atrium is normal in size.   6. The right atrium is normal in size.   7. Sclerotic aortic valve with normal excursion.   8. Structurally normal mitral valve, with normal leaflet excursion.   9. Adequate TR velocity was not obtained to accurately assess RVSP.    MD Chandler Electronically signed on 5/9/2019 at 10:46:23 AM            < end of copied text >    Catheterization:  Stress Test:    CXR (image reviewed by me):  OTHER: 	    LABS:	 	  CARDIAC MARKERS:                                  12.5   5.8   )-----------( 106      ( 09 May 2019 02:21 )             38.6     05-08    138  |  102  |  9.0  ----------------------------<  139<H>  4.3   |  27.0  |  0.73    Ca    9.4      08 May 2019 19:12  Mg     1.9     05-08    TPro  8.3  /  Alb  4.0  /  TBili  0.4  /  DBili  x   /  AST  29  /  ALT  27  /  AlkPhos  69  05-08    BNP: Serum Pro-Brain Natriuretic Peptide: 46 pg/mL (05-08 @ 15:50)    Lipid Profile:   HgA1c: Hemoglobin A1C, Whole Blood: 5.0 % (05-09 @ 06:12)    TSH:

## 2019-05-09 NOTE — ED ADULT NURSE REASSESSMENT NOTE - COMFORT CARE
meal provided
plan of care explained/wait time explained/darkened lights
wait time explained/plan of care explained
wait time explained/plan of care explained
wait time explained/warm blanket provided/side rails down/plan of care explained

## 2019-05-09 NOTE — ED ADULT NURSE REASSESSMENT NOTE - GENERAL PATIENT STATE
resting/sleeping/comfortable appearance/smiling/interactive/cooperative
smiling/interactive/resting/sleeping/anxious
anxious/no change observed/smiling/interactive
comfortable appearance/cooperative

## 2019-05-09 NOTE — ED CDU PROVIDER DISPOSITION NOTE - ATTENDING CONTRIBUTION TO CARE
I, Nataliia Mendez, participated in the care of this patient with the PA. I discussed the history and physical exam findings as well as lab results and plan of care with the PA. I agree with PA's history, physical and assessment. I agree with final disposition.

## 2019-05-09 NOTE — PROVIDER CONTACT NOTE (OTHER) - ASSESSMENT
Patient is a long term resident at [facility]. Patient has a prescheduled Cardiology appointment at [facility] on Monday, 5/13/19 @ 9:45 AM. SW made aware.

## 2019-05-09 NOTE — CHART NOTE - NSCHARTNOTEFT_GEN_A_CORE
SW note - pt requires BLS transport to return to [facility], [facility] pt and AGUSTÍN Martinez was notified of pending transport. FABIANO with villegas clerk at desk.

## 2019-05-09 NOTE — ED CDU PROVIDER DISPOSITION NOTE - CLINICAL COURSE
69 yo M c/o chest pain  -Trop/EKG x3, no events on tele, unremarkable TTE and nuclear stress, cleared by cardiology for outpatient f/u.

## 2019-05-09 NOTE — ED CDU PROVIDER SUBSEQUENT DAY NOTE - HISTORY
No acute findings, pt resting comfortably, VS stable, asymptomatic at this time. Trop neg x 3, pt awaiting stress test in AM.

## 2019-05-09 NOTE — ED CDU PROVIDER SUBSEQUENT DAY NOTE - PROGRESS NOTE DETAILS
As per nurse, pt left unit and was found eating donut in B-side of ED. Pt supposed to be NPO for AM stress test. Pt received from NETO Campos, pending echo and stress test. Pt with elevated BGL > 400, d/w Dr. Mendez, will give insulin and re-eval.

## 2019-05-13 ENCOUNTER — NON-APPOINTMENT (OUTPATIENT)
Age: 69
End: 2019-05-13

## 2019-05-13 ENCOUNTER — APPOINTMENT (OUTPATIENT)
Dept: CARDIOLOGY | Facility: CLINIC | Age: 69
End: 2019-05-13
Payer: MEDICARE

## 2019-05-13 VITALS
DIASTOLIC BLOOD PRESSURE: 109 MMHG | BODY MASS INDEX: 35.77 KG/M2 | HEART RATE: 89 BPM | HEIGHT: 68 IN | SYSTOLIC BLOOD PRESSURE: 163 MMHG | OXYGEN SATURATION: 95 % | WEIGHT: 236 LBS

## 2019-05-13 VITALS — SYSTOLIC BLOOD PRESSURE: 146 MMHG | DIASTOLIC BLOOD PRESSURE: 93 MMHG

## 2019-05-13 VITALS — DIASTOLIC BLOOD PRESSURE: 90 MMHG | SYSTOLIC BLOOD PRESSURE: 142 MMHG

## 2019-05-13 DIAGNOSIS — H40.9 UNSPECIFIED GLAUCOMA: ICD-10-CM

## 2019-05-13 DIAGNOSIS — I10 ESSENTIAL (PRIMARY) HYPERTENSION: ICD-10-CM

## 2019-05-13 DIAGNOSIS — E03.9 HYPOTHYROIDISM, UNSPECIFIED: ICD-10-CM

## 2019-05-13 DIAGNOSIS — R07.9 CHEST PAIN, UNSPECIFIED: ICD-10-CM

## 2019-05-13 DIAGNOSIS — F31.9 BIPOLAR DISORDER, UNSPECIFIED: ICD-10-CM

## 2019-05-13 DIAGNOSIS — M79.89 OTHER SPECIFIED SOFT TISSUE DISORDERS: ICD-10-CM

## 2019-05-13 DIAGNOSIS — E78.00 PURE HYPERCHOLESTEROLEMIA, UNSPECIFIED: ICD-10-CM

## 2019-05-13 DIAGNOSIS — F20.9 SCHIZOPHRENIA, UNSPECIFIED: ICD-10-CM

## 2019-05-13 PROCEDURE — 93000 ELECTROCARDIOGRAM COMPLETE: CPT

## 2019-05-13 PROCEDURE — 99215 OFFICE O/P EST HI 40 MIN: CPT

## 2019-05-13 RX ORDER — HALOPERIDOL DECANOATE 100 MG/ML
100 INJECTION INTRAMUSCULAR DAILY
Refills: 0 | Status: ACTIVE | COMMUNITY
Start: 2019-05-13

## 2019-05-13 RX ORDER — METOPROLOL SUCCINATE 100 MG/1
100 TABLET, EXTENDED RELEASE ORAL DAILY
Qty: 90 | Refills: 1 | Status: ACTIVE | COMMUNITY
Start: 2019-05-13

## 2019-05-13 RX ORDER — BENZTROPINE MESYLATE 0.5 MG/1
0.5 TABLET ORAL
Refills: 0 | Status: ACTIVE | COMMUNITY
Start: 2019-05-13

## 2019-05-13 RX ORDER — LEVOTHYROXINE SODIUM 0.03 MG/1
25 TABLET ORAL
Qty: 90 | Refills: 3 | Status: ACTIVE | COMMUNITY
Start: 2019-05-13

## 2019-05-13 RX ORDER — INSULIN GLARGINE 100 [IU]/ML
100 INJECTION, SOLUTION SUBCUTANEOUS AT BEDTIME
Refills: 0 | Status: ACTIVE | COMMUNITY
Start: 2019-05-13

## 2019-05-13 RX ORDER — BRIMONIDINE TARTRATE 2 MG/MG
0.2 SOLUTION/ DROPS OPHTHALMIC
Refills: 0 | Status: ACTIVE | COMMUNITY
Start: 2019-05-13

## 2019-05-13 RX ORDER — QUETIAPINE FUMARATE 100 MG/1
100 TABLET ORAL
Refills: 0 | Status: ACTIVE | COMMUNITY
Start: 2019-05-13

## 2019-05-13 RX ORDER — DULAGLUTIDE 1.5 MG/.5ML
1.5 INJECTION, SOLUTION SUBCUTANEOUS
Refills: 0 | Status: ACTIVE | COMMUNITY
Start: 2019-05-13

## 2019-05-13 RX ORDER — METOPROLOL SUCCINATE 50 MG/1
50 TABLET, EXTENDED RELEASE ORAL DAILY
Refills: 0 | Status: ACTIVE | COMMUNITY
Start: 2019-05-13

## 2019-05-13 RX ORDER — LATANOPROST/PF 0.005 %
0.01 DROPS OPHTHALMIC (EYE) DAILY
Refills: 0 | Status: ACTIVE | COMMUNITY
Start: 2019-05-13

## 2019-05-13 RX ORDER — DIVALPROEX SODIUM 500 MG/1
500 TABLET, EXTENDED RELEASE ORAL
Refills: 0 | Status: ACTIVE | COMMUNITY
Start: 2019-05-13

## 2019-05-14 RX ORDER — LOSARTAN POTASSIUM 50 MG/1
50 TABLET, FILM COATED ORAL DAILY
Qty: 30 | Refills: 0 | Status: DISCONTINUED | COMMUNITY
Start: 2019-05-13 | End: 2019-05-14

## 2019-05-14 RX ORDER — HYDRALAZINE HYDROCHLORIDE 25 MG/1
25 TABLET ORAL 3 TIMES DAILY
Qty: 90 | Refills: 3 | Status: ACTIVE | COMMUNITY
Start: 2019-05-14

## 2019-06-08 ENCOUNTER — EMERGENCY (EMERGENCY)
Facility: HOSPITAL | Age: 69
LOS: 1 days | Discharge: LEFT BEFORE DISCHARGE | End: 2019-06-08
Attending: STUDENT IN AN ORGANIZED HEALTH CARE EDUCATION/TRAINING PROGRAM
Payer: COMMERCIAL

## 2019-06-08 VITALS
TEMPERATURE: 98 F | HEIGHT: 68 IN | WEIGHT: 255.07 LBS | OXYGEN SATURATION: 97 % | SYSTOLIC BLOOD PRESSURE: 147 MMHG | DIASTOLIC BLOOD PRESSURE: 82 MMHG | HEART RATE: 89 BPM | RESPIRATION RATE: 20 BRPM

## 2019-06-08 LAB
ALBUMIN SERPL ELPH-MCNC: 4 G/DL — SIGNIFICANT CHANGE UP (ref 3.3–5.2)
ALP SERPL-CCNC: 60 U/L — SIGNIFICANT CHANGE UP (ref 40–120)
ALT FLD-CCNC: 19 U/L — SIGNIFICANT CHANGE UP
ANION GAP SERPL CALC-SCNC: 13 MMOL/L — SIGNIFICANT CHANGE UP (ref 5–17)
AST SERPL-CCNC: 22 U/L — SIGNIFICANT CHANGE UP
BILIRUB SERPL-MCNC: 0.4 MG/DL — SIGNIFICANT CHANGE UP (ref 0.4–2)
BUN SERPL-MCNC: 14 MG/DL — SIGNIFICANT CHANGE UP (ref 8–20)
CALCIUM SERPL-MCNC: 9.1 MG/DL — SIGNIFICANT CHANGE UP (ref 8.6–10.2)
CHLORIDE SERPL-SCNC: 101 MMOL/L — SIGNIFICANT CHANGE UP (ref 98–107)
CO2 SERPL-SCNC: 24 MMOL/L — SIGNIFICANT CHANGE UP (ref 22–29)
CREAT SERPL-MCNC: 0.88 MG/DL — SIGNIFICANT CHANGE UP (ref 0.5–1.3)
GLUCOSE SERPL-MCNC: 138 MG/DL — HIGH (ref 70–115)
HCT VFR BLD CALC: 39 % — LOW (ref 42–52)
HGB BLD-MCNC: 12.6 G/DL — LOW (ref 14–18)
LIDOCAIN IGE QN: 42 U/L — SIGNIFICANT CHANGE UP (ref 22–51)
MAGNESIUM SERPL-MCNC: 1.9 MG/DL — SIGNIFICANT CHANGE UP (ref 1.6–2.6)
MCHC RBC-ENTMCNC: 28.3 PG — SIGNIFICANT CHANGE UP (ref 27–31)
MCHC RBC-ENTMCNC: 32.3 G/DL — SIGNIFICANT CHANGE UP (ref 32–36)
MCV RBC AUTO: 87.4 FL — SIGNIFICANT CHANGE UP (ref 80–94)
PLATELET # BLD AUTO: 160 K/UL — SIGNIFICANT CHANGE UP (ref 150–400)
POTASSIUM SERPL-MCNC: 4.2 MMOL/L — SIGNIFICANT CHANGE UP (ref 3.5–5.3)
POTASSIUM SERPL-SCNC: 4.2 MMOL/L — SIGNIFICANT CHANGE UP (ref 3.5–5.3)
PROT SERPL-MCNC: 8.2 G/DL — SIGNIFICANT CHANGE UP (ref 6.6–8.7)
RBC # BLD: 4.46 M/UL — LOW (ref 4.6–6.2)
RBC # FLD: 13.8 % — SIGNIFICANT CHANGE UP (ref 11–15.6)
SODIUM SERPL-SCNC: 138 MMOL/L — SIGNIFICANT CHANGE UP (ref 135–145)
TROPONIN T SERPL-MCNC: <0.01 NG/ML — SIGNIFICANT CHANGE UP (ref 0–0.06)
WBC # BLD: 9.5 K/UL — SIGNIFICANT CHANGE UP (ref 4.8–10.8)
WBC # FLD AUTO: 9.5 K/UL — SIGNIFICANT CHANGE UP (ref 4.8–10.8)

## 2019-06-08 PROCEDURE — 93005 ELECTROCARDIOGRAM TRACING: CPT

## 2019-06-08 PROCEDURE — 83690 ASSAY OF LIPASE: CPT

## 2019-06-08 PROCEDURE — 71045 X-RAY EXAM CHEST 1 VIEW: CPT

## 2019-06-08 PROCEDURE — 99284 EMERGENCY DEPT VISIT MOD MDM: CPT | Mod: 25

## 2019-06-08 PROCEDURE — 85027 COMPLETE CBC AUTOMATED: CPT

## 2019-06-08 PROCEDURE — 84484 ASSAY OF TROPONIN QUANT: CPT

## 2019-06-08 PROCEDURE — 83735 ASSAY OF MAGNESIUM: CPT

## 2019-06-08 PROCEDURE — 93010 ELECTROCARDIOGRAM REPORT: CPT

## 2019-06-08 PROCEDURE — 99053 MED SERV 10PM-8AM 24 HR FAC: CPT

## 2019-06-08 PROCEDURE — 71045 X-RAY EXAM CHEST 1 VIEW: CPT | Mod: 26

## 2019-06-08 PROCEDURE — 80053 COMPREHEN METABOLIC PANEL: CPT

## 2019-06-08 PROCEDURE — 36415 COLL VENOUS BLD VENIPUNCTURE: CPT

## 2019-06-08 NOTE — ED ADULT NURSE NOTE - NSIMPLEMENTINTERV_GEN_ALL_ED
Implemented All Fall Risk Interventions:  [place] to call system. Call bell, personal items and telephone within reach. Instruct patient to call for assistance. Room bathroom lighting operational. Non-slip footwear when patient is off stretcher. Physically safe environment: no spills, clutter or unnecessary equipment. Stretcher in lowest position, wheels locked, appropriate side rails in place. Provide visual cue, wrist band, yellow gown, etc. Monitor gait and stability. Monitor for mental status changes and reorient to person, place, and time. Review medications for side effects contributing to fall risk. Reinforce activity limits and safety measures with patient and family.

## 2019-06-08 NOTE — ED PROVIDER NOTE - PROGRESS NOTE DETAILS
Previous study noted from this year. Consult reviewed. Recommend patient remain for cardiac evaluation du to risk factors and he declines. Pt understands and recalls risks of leaving against medical advice, including death from undiagnosed cardiac disease. Pt states that pt will see PMD. Pt advised to return to the ED if pt feels worse.

## 2019-06-08 NOTE — ED ADULT NURSE REASSESSMENT NOTE - NS ED NURSE REASSESS COMMENT FT1
Patient reports his chest pain is gone, "it feels better", pt. resting in chair, discussed with pt. that transportation is on the way, VSS, will continue to monitor.

## 2019-06-08 NOTE — ED ADULT NURSE NOTE - CHIEF COMPLAINT QUOTE
Pt ANDERSON from [facility] c/o chest pressure.  Reports dull pain in center of his chest.

## 2019-06-08 NOTE — ED PROVIDER NOTE - CLINICAL SUMMARY MEDICAL DECISION MAKING FREE TEXT BOX
patient with episode of chest pain. Has had recent evaluation at [facility]. Patient with multiple risk factors and recommends continued evaluation in the ED. He states he feels better and does not want further evaluation despite his acknowledgment of the risks.

## 2019-06-08 NOTE — ED ADULT NURSE REASSESSMENT NOTE - NS ED NURSE REASSESS COMMENT FT1
Discharge paperwork given to [facility] by Aishwarya Balbuena RN.  IV taken out by LEXUS RANDOLPH.

## 2019-06-08 NOTE — ED ADULT NURSE NOTE - OBJECTIVE STATEMENT
pt sitting in chair on cardiac monitor. pt is alert and oriented x2. pt c/o of chest pain beginning yesterday evening. as per pt pain does not radiate and is constant. upon assessment no sob, pt has tremors in bilateral upper extremities.

## 2019-06-08 NOTE — CHART NOTE - NSCHARTNOTEFT_GEN_A_CORE
Pt requiring a ambulette for transfer back to [place] in [place]. SW arranged transport. RN aware. No need for SW to follow

## 2019-06-08 NOTE — ED PROVIDER NOTE - OBJECTIVE STATEMENT
67 yo male presents for evaluation of one hour of chest pressure without additional associated symptoms.

## 2019-10-17 ENCOUNTER — OUTPATIENT (OUTPATIENT)
Dept: OUTPATIENT SERVICES | Facility: HOSPITAL | Age: 69
LOS: 1 days | End: 2019-10-17

## 2019-10-17 ENCOUNTER — APPOINTMENT (OUTPATIENT)
Dept: CT IMAGING | Facility: CLINIC | Age: 69
End: 2019-10-17
Payer: MEDICARE

## 2019-10-17 DIAGNOSIS — R25.1 TREMOR, UNSPECIFIED: ICD-10-CM

## 2019-10-17 PROCEDURE — 70450 CT HEAD/BRAIN W/O DYE: CPT | Mod: 26

## 2020-02-25 ENCOUNTER — INPATIENT (INPATIENT)
Facility: HOSPITAL | Age: 70
LOS: 2 days | Discharge: NURSING, REHAB OR LONG-TERM CARE | End: 2020-02-28
Attending: LEGAL MEDICINE | Admitting: LEGAL MEDICINE
Payer: MEDICARE

## 2020-02-25 VITALS
RESPIRATION RATE: 17 BRPM | OXYGEN SATURATION: 98 % | TEMPERATURE: 98 F | DIASTOLIC BLOOD PRESSURE: 77 MMHG | HEART RATE: 109 BPM | SYSTOLIC BLOOD PRESSURE: 112 MMHG

## 2020-02-25 DIAGNOSIS — I10 ESSENTIAL (PRIMARY) HYPERTENSION: ICD-10-CM

## 2020-02-25 DIAGNOSIS — Z29.9 ENCOUNTER FOR PROPHYLACTIC MEASURES, UNSPECIFIED: ICD-10-CM

## 2020-02-25 DIAGNOSIS — R69 ILLNESS, UNSPECIFIED: ICD-10-CM

## 2020-02-25 DIAGNOSIS — R41.82 ALTERED MENTAL STATUS, UNSPECIFIED: ICD-10-CM

## 2020-02-25 DIAGNOSIS — G93.41 METABOLIC ENCEPHALOPATHY: ICD-10-CM

## 2020-02-25 DIAGNOSIS — F03.90 UNSPECIFIED DEMENTIA WITHOUT BEHAVIORAL DISTURBANCE: ICD-10-CM

## 2020-02-25 DIAGNOSIS — F31.9 BIPOLAR DISORDER, UNSPECIFIED: ICD-10-CM

## 2020-02-25 DIAGNOSIS — F25.9 SCHIZOAFFECTIVE DISORDER, UNSPECIFIED: ICD-10-CM

## 2020-02-25 DIAGNOSIS — F20.9 SCHIZOPHRENIA, UNSPECIFIED: ICD-10-CM

## 2020-02-25 DIAGNOSIS — Z02.9 ENCOUNTER FOR ADMINISTRATIVE EXAMINATIONS, UNSPECIFIED: ICD-10-CM

## 2020-02-25 DIAGNOSIS — G40.909 EPILEPSY, UNSPECIFIED, NOT INTRACTABLE, WITHOUT STATUS EPILEPTICUS: ICD-10-CM

## 2020-02-25 DIAGNOSIS — E11.69 TYPE 2 DIABETES MELLITUS WITH OTHER SPECIFIED COMPLICATION: ICD-10-CM

## 2020-02-25 LAB
ALBUMIN SERPL ELPH-MCNC: 3.5 G/DL — SIGNIFICANT CHANGE UP (ref 3.3–5)
ALP SERPL-CCNC: 100 U/L — SIGNIFICANT CHANGE UP (ref 40–120)
ALT FLD-CCNC: 63 U/L — HIGH (ref 4–41)
AMPHET UR-MCNC: NEGATIVE — SIGNIFICANT CHANGE UP
ANION GAP SERPL CALC-SCNC: 11 MMO/L — SIGNIFICANT CHANGE UP (ref 7–14)
APAP SERPL-MCNC: < 15 UG/ML — LOW (ref 15–25)
APPEARANCE UR: CLEAR — SIGNIFICANT CHANGE UP
AST SERPL-CCNC: 52 U/L — HIGH (ref 4–40)
BARBITURATES UR SCN-MCNC: POSITIVE — SIGNIFICANT CHANGE UP
BASOPHILS # BLD AUTO: 0.03 K/UL — SIGNIFICANT CHANGE UP (ref 0–0.2)
BASOPHILS NFR BLD AUTO: 0.3 % — SIGNIFICANT CHANGE UP (ref 0–2)
BENZODIAZ UR-MCNC: NEGATIVE — SIGNIFICANT CHANGE UP
BILIRUB SERPL-MCNC: 0.5 MG/DL — SIGNIFICANT CHANGE UP (ref 0.2–1.2)
BILIRUB UR-MCNC: NEGATIVE — SIGNIFICANT CHANGE UP
BLOOD UR QL VISUAL: NEGATIVE — SIGNIFICANT CHANGE UP
BUN SERPL-MCNC: 15 MG/DL — SIGNIFICANT CHANGE UP (ref 7–23)
CALCIUM SERPL-MCNC: 9.5 MG/DL — SIGNIFICANT CHANGE UP (ref 8.4–10.5)
CANNABINOIDS UR-MCNC: NEGATIVE — SIGNIFICANT CHANGE UP
CHLORIDE SERPL-SCNC: 106 MMOL/L — SIGNIFICANT CHANGE UP (ref 98–107)
CO2 SERPL-SCNC: 23 MMOL/L — SIGNIFICANT CHANGE UP (ref 22–31)
COCAINE METAB.OTHER UR-MCNC: NEGATIVE — SIGNIFICANT CHANGE UP
COLOR SPEC: YELLOW — SIGNIFICANT CHANGE UP
CREAT SERPL-MCNC: 0.93 MG/DL — SIGNIFICANT CHANGE UP (ref 0.5–1.3)
EOSINOPHIL # BLD AUTO: 0.19 K/UL — SIGNIFICANT CHANGE UP (ref 0–0.5)
EOSINOPHIL NFR BLD AUTO: 2.2 % — SIGNIFICANT CHANGE UP (ref 0–6)
ETHANOL BLD-MCNC: < 10 MG/DL — SIGNIFICANT CHANGE UP
GLUCOSE BLDC GLUCOMTR-MCNC: 116 MG/DL — HIGH (ref 70–99)
GLUCOSE SERPL-MCNC: 124 MG/DL — HIGH (ref 70–99)
GLUCOSE UR-MCNC: NEGATIVE — SIGNIFICANT CHANGE UP
HCT VFR BLD CALC: 42.4 % — SIGNIFICANT CHANGE UP (ref 39–50)
HGB BLD-MCNC: 13 G/DL — SIGNIFICANT CHANGE UP (ref 13–17)
IMM GRANULOCYTES NFR BLD AUTO: 0.3 % — SIGNIFICANT CHANGE UP (ref 0–1.5)
KETONES UR-MCNC: NEGATIVE — SIGNIFICANT CHANGE UP
LEUKOCYTE ESTERASE UR-ACNC: NEGATIVE — SIGNIFICANT CHANGE UP
LIDOCAIN IGE QN: 22.3 U/L — SIGNIFICANT CHANGE UP (ref 7–60)
LITHIUM SERPL-MCNC: 0.06 MMOL/L — LOW (ref 0.6–1.2)
LYMPHOCYTES # BLD AUTO: 1.57 K/UL — SIGNIFICANT CHANGE UP (ref 1–3.3)
LYMPHOCYTES # BLD AUTO: 17.9 % — SIGNIFICANT CHANGE UP (ref 13–44)
MCHC RBC-ENTMCNC: 28.8 PG — SIGNIFICANT CHANGE UP (ref 27–34)
MCHC RBC-ENTMCNC: 30.7 % — LOW (ref 32–36)
MCV RBC AUTO: 93.8 FL — SIGNIFICANT CHANGE UP (ref 80–100)
METHADONE UR-MCNC: NEGATIVE — SIGNIFICANT CHANGE UP
MONOCYTES # BLD AUTO: 0.62 K/UL — SIGNIFICANT CHANGE UP (ref 0–0.9)
MONOCYTES NFR BLD AUTO: 7.1 % — SIGNIFICANT CHANGE UP (ref 2–14)
NEUTROPHILS # BLD AUTO: 6.35 K/UL — SIGNIFICANT CHANGE UP (ref 1.8–7.4)
NEUTROPHILS NFR BLD AUTO: 72.2 % — SIGNIFICANT CHANGE UP (ref 43–77)
NITRITE UR-MCNC: NEGATIVE — SIGNIFICANT CHANGE UP
NRBC # FLD: 0 K/UL — SIGNIFICANT CHANGE UP (ref 0–0)
OPIATES UR-MCNC: NEGATIVE — SIGNIFICANT CHANGE UP
OXYCODONE UR-MCNC: NEGATIVE — SIGNIFICANT CHANGE UP
PCP UR-MCNC: NEGATIVE — SIGNIFICANT CHANGE UP
PH UR: 6 — SIGNIFICANT CHANGE UP (ref 5–8)
PLATELET # BLD AUTO: 224 K/UL — SIGNIFICANT CHANGE UP (ref 150–400)
PMV BLD: 10.5 FL — SIGNIFICANT CHANGE UP (ref 7–13)
POTASSIUM SERPL-MCNC: 4 MMOL/L — SIGNIFICANT CHANGE UP (ref 3.5–5.3)
POTASSIUM SERPL-SCNC: 4 MMOL/L — SIGNIFICANT CHANGE UP (ref 3.5–5.3)
PROT SERPL-MCNC: 8 G/DL — SIGNIFICANT CHANGE UP (ref 6–8.3)
PROT UR-MCNC: 10 — SIGNIFICANT CHANGE UP
RBC # BLD: 4.52 M/UL — SIGNIFICANT CHANGE UP (ref 4.2–5.8)
RBC # FLD: 14.7 % — HIGH (ref 10.3–14.5)
SALICYLATES SERPL-MCNC: < 5 MG/DL — LOW (ref 15–30)
SODIUM SERPL-SCNC: 140 MMOL/L — SIGNIFICANT CHANGE UP (ref 135–145)
SP GR SPEC: 1.02 — SIGNIFICANT CHANGE UP (ref 1–1.04)
TSH SERPL-MCNC: 1.78 UIU/ML — SIGNIFICANT CHANGE UP (ref 0.27–4.2)
UROBILINOGEN FLD QL: NORMAL — SIGNIFICANT CHANGE UP
VALPROATE SERPL-MCNC: < 3.2 UG/ML — LOW (ref 50–100)
WBC # BLD: 8.79 K/UL — SIGNIFICANT CHANGE UP (ref 3.8–10.5)
WBC # FLD AUTO: 8.79 K/UL — SIGNIFICANT CHANGE UP (ref 3.8–10.5)

## 2020-02-25 PROCEDURE — 99223 1ST HOSP IP/OBS HIGH 75: CPT

## 2020-02-25 PROCEDURE — 70450 CT HEAD/BRAIN W/O DYE: CPT | Mod: 26

## 2020-02-25 PROCEDURE — 71045 X-RAY EXAM CHEST 1 VIEW: CPT | Mod: 26

## 2020-02-25 RX ORDER — FLUOXETINE HCL 10 MG
1 CAPSULE ORAL
Qty: 0 | Refills: 0 | DISCHARGE

## 2020-02-25 RX ORDER — SODIUM CHLORIDE 9 MG/ML
1000 INJECTION, SOLUTION INTRAVENOUS
Refills: 0 | Status: DISCONTINUED | OUTPATIENT
Start: 2020-02-25 | End: 2020-02-28

## 2020-02-25 RX ORDER — ATORVASTATIN CALCIUM 80 MG/1
80 TABLET, FILM COATED ORAL AT BEDTIME
Refills: 0 | Status: DISCONTINUED | OUTPATIENT
Start: 2020-02-25 | End: 2020-02-28

## 2020-02-25 RX ORDER — INSULIN LISPRO 100/ML
VIAL (ML) SUBCUTANEOUS AT BEDTIME
Refills: 0 | Status: DISCONTINUED | OUTPATIENT
Start: 2020-02-25 | End: 2020-02-28

## 2020-02-25 RX ORDER — LEVOTHYROXINE SODIUM 125 MCG
25 TABLET ORAL DAILY
Refills: 0 | Status: DISCONTINUED | OUTPATIENT
Start: 2020-02-25 | End: 2020-02-28

## 2020-02-25 RX ORDER — METOPROLOL TARTRATE 50 MG
100 TABLET ORAL DAILY
Refills: 0 | Status: DISCONTINUED | OUTPATIENT
Start: 2020-02-26 | End: 2020-02-28

## 2020-02-25 RX ORDER — INSULIN LISPRO 100/ML
VIAL (ML) SUBCUTANEOUS
Refills: 0 | Status: DISCONTINUED | OUTPATIENT
Start: 2020-02-25 | End: 2020-02-28

## 2020-02-25 RX ORDER — SIMVASTATIN 20 MG/1
1 TABLET, FILM COATED ORAL
Qty: 0 | Refills: 0 | DISCHARGE

## 2020-02-25 RX ORDER — DEXTROSE 50 % IN WATER 50 %
25 SYRINGE (ML) INTRAVENOUS ONCE
Refills: 0 | Status: DISCONTINUED | OUTPATIENT
Start: 2020-02-25 | End: 2020-02-28

## 2020-02-25 RX ORDER — INSULIN GLARGINE 100 [IU]/ML
15 INJECTION, SOLUTION SUBCUTANEOUS AT BEDTIME
Refills: 0 | Status: DISCONTINUED | OUTPATIENT
Start: 2020-02-25 | End: 2020-02-28

## 2020-02-25 RX ORDER — OLANZAPINE 15 MG/1
1.25 TABLET, FILM COATED ORAL EVERY 6 HOURS
Refills: 0 | Status: DISCONTINUED | OUTPATIENT
Start: 2020-02-25 | End: 2020-02-28

## 2020-02-25 RX ORDER — DIVALPROEX SODIUM 500 MG/1
2 TABLET, DELAYED RELEASE ORAL
Qty: 0 | Refills: 0 | DISCHARGE

## 2020-02-25 RX ORDER — DIVALPROEX SODIUM 500 MG/1
1 TABLET, DELAYED RELEASE ORAL
Qty: 0 | Refills: 0 | DISCHARGE

## 2020-02-25 RX ORDER — ASPIRIN/CALCIUM CARB/MAGNESIUM 324 MG
81 TABLET ORAL DAILY
Refills: 0 | Status: DISCONTINUED | OUTPATIENT
Start: 2020-02-26 | End: 2020-02-28

## 2020-02-25 RX ORDER — CLONAZEPAM 1 MG
1 TABLET ORAL EVERY 12 HOURS
Refills: 0 | Status: DISCONTINUED | OUTPATIENT
Start: 2020-02-25 | End: 2020-02-27

## 2020-02-25 RX ORDER — DEXTROSE 50 % IN WATER 50 %
12.5 SYRINGE (ML) INTRAVENOUS ONCE
Refills: 0 | Status: DISCONTINUED | OUTPATIENT
Start: 2020-02-25 | End: 2020-02-28

## 2020-02-25 RX ORDER — DEXTROSE 50 % IN WATER 50 %
15 SYRINGE (ML) INTRAVENOUS ONCE
Refills: 0 | Status: DISCONTINUED | OUTPATIENT
Start: 2020-02-25 | End: 2020-02-28

## 2020-02-25 RX ORDER — GLUCAGON INJECTION, SOLUTION 0.5 MG/.1ML
1 INJECTION, SOLUTION SUBCUTANEOUS ONCE
Refills: 0 | Status: DISCONTINUED | OUTPATIENT
Start: 2020-02-25 | End: 2020-02-28

## 2020-02-25 RX ORDER — HEPARIN SODIUM 5000 [USP'U]/ML
5000 INJECTION INTRAVENOUS; SUBCUTANEOUS EVERY 12 HOURS
Refills: 0 | Status: DISCONTINUED | OUTPATIENT
Start: 2020-02-25 | End: 2020-02-28

## 2020-02-25 RX ORDER — BENZTROPINE MESYLATE 1 MG
1 TABLET ORAL
Refills: 0 | Status: DISCONTINUED | OUTPATIENT
Start: 2020-02-25 | End: 2020-02-28

## 2020-02-25 RX ADMIN — ATORVASTATIN CALCIUM 80 MILLIGRAM(S): 80 TABLET, FILM COATED ORAL at 23:03

## 2020-02-25 RX ADMIN — Medication 1 MILLIGRAM(S): at 23:03

## 2020-02-25 RX ADMIN — INSULIN GLARGINE 15 UNIT(S): 100 INJECTION, SOLUTION SUBCUTANEOUS at 23:46

## 2020-02-25 RX ADMIN — HEPARIN SODIUM 5000 UNIT(S): 5000 INJECTION INTRAVENOUS; SUBCUTANEOUS at 23:03

## 2020-02-25 NOTE — ED BEHAVIORAL HEALTH ASSESSMENT NOTE - OTHER PAST PSYCHIATRIC HISTORY (INCLUDE DETAILS REGARDING ONSET, COURSE OF ILLNESS, INPATIENT/OUTPATIENT TREATMENT)
Pt. known to have paranoid Sx for approximately 10 years without receiving psychiatric Tx. Hx of opiate dependence and methadone treatment. [facility]. [facility] for and from there admitted to [facility] for paranoid Sx and behavioral disturbances. Diagnosed with dementia bipolar disorder, schizoaffective disorder, ALISHA. Family believes pt. received these diagnoses at [facility]. Recent behavioral changes and sedation 2/2 to medication at nursing home led to psychiatric hospitalization at [place] with decomposition of medications. Pt. known to have paranoid Sx for approximately 10 years without receiving psychiatric tx. Hx of opiate dependence and methadone treatment. Pt. was [facility] years ago for and from there admitted to [facility] for paranoid Sx and behavioral disturbances. He was diagnosed with dementia bipolar disorder, schizoaffective disorder, ALISHA while at [facility]. Family believes pt. received these diagnoses at [facility]. Recent behavioral changes and sedation 2/2 to medication at nursing home led to psychiatric hospitalization at [place] with decomposition of medications.

## 2020-02-25 NOTE — ED PROVIDER NOTE - NS ED ROS FT
ROS  	•	CONSTITUTIONAL - no fever, no diaphoresis, no weight change  	•	SKIN - no rash  	•	HEMATOLOGIC - no bleeding,   	•	EYES - no eye pain, no blurred vision  	•	ENT - no change in hearing, no pain  	•	RESPIRATORY - no shortness of breath, no cough  	•	CARDIAC - no chest pain, no palpitations  	•	GI - + abd pain, no nausea, no vomiting, no diarrhea, no constipation, no bleeding  	•	GENITO-URINARY - no frequent urination, no urgency, no dysuria; no hematuria,    	•	ENDO - no polydypsia, no polyurea, no heat/no cold intolerance  	•	NEUROLOGIC - no weakness, no headache, no anesthesia, no paresthesias  	•	PSYCH + anxiety, no suicidal, no homicidal, no hallucination, no depression

## 2020-02-25 NOTE — ED BEHAVIORAL HEALTH ASSESSMENT NOTE - RISK ASSESSMENT
Low Acute Suicide Risk Patient presents at chronic risk as evidence by aggressive/agitated behavior likely related to dementia, unclear chronic mental illness prior to dementia diagnosis, impulsive/unpredictable behavior related to dementia, multiple medical comorbidities, history of inpatient admission, history of substance use     acute risk factors- recent aggression/violence toward staff, recent hospital discharge    protective factors- 24/7 supervised residence, eating, sleeping, medication compliant, no legal issues, supportive family, no acutely psychotic/manic, no SI/HI/SIB/intent/plan, no history of suicide attempts

## 2020-02-25 NOTE — H&P ADULT - NSHPLABSRESULTS_GEN_ALL_CORE
140  |  106  |  15  ----------------------------<  124<H>  4.0   |  23  |  0.93    Ca    9.5      2020 14:50    TPro  8.0  /  Alb  3.5  /  TBili  0.5  /  DBili  x   /  AST  52<H>  /  ALT  63<H>  /  AlkPhos  100                              13.0   8.79  )-----------( 224      ( 2020 14:50 )             42.4             LIVER FUNCTIONS - ( 2020 14:50 )  Alb: 3.5 g/dL / Pro: 8.0 g/dL / ALK PHOS: 100 u/L / ALT: 63 u/L / AST: 52 u/L / GGT: x                 Urinalysis Basic - ( 2020 19:30 )    Color: YELLOW / Appearance: CLEAR / S.018 / pH: 6.0  Gluc: NEGATIVE / Ketone: NEGATIVE  / Bili: NEGATIVE / Urobili: NORMAL   Blood: NEGATIVE / Protein: 10 / Nitrite: NEGATIVE   Leuk Esterase: NEGATIVE / RBC: x / WBC x   Sq Epi: x / Non Sq Epi: x / Bacteria: x    CXR: rotated image, appears clear on my read    EKG: sinus tachycardia with QTC of 470    CTH: IMPRESSION:    Stable exam.    No mass effect, hemorrhage or evidence of acute intracranial pathology.

## 2020-02-25 NOTE — H&P ADULT - PROBLEM SELECTOR PLAN 1
Presented for agitation, but now calm. No fever/chills. No source of infection seen  -suspect decline in cognitive function related to schizophrenia vs bipolar vs dementia?  -CTH with no findigns. Pt aox1. Possibly also from polypharmacy.   -per psych start home benzo, benzotropine, and prn zyprexa  -check b12/folate. TSH wnl

## 2020-02-25 NOTE — H&P ADULT - NSHPREVIEWOFSYSTEMS_GEN_ALL_CORE
REVIEW OF SYSTEMS: limited due to patient participation/dementia    CONSTITUTIONAL: No fevers or chills  NECK: No pain or stiffness  RESPIRATORY: No shortness of breath  CARDIOVASCULAR: No chest pain   GASTROINTESTINAL: No abdominal or epigastric pain. No diarrhea  GENITOURINARY: No dysuria  NEUROLOGICAL: No numbness or weakness  SKIN: No itching, burning, rashes, or lesions
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
## 2020-02-25 NOTE — ED BEHAVIORAL HEALTH ASSESSMENT NOTE - PAST PSYCHOTROPIC MEDICATION
medication was reduced to only Seroquel 50 BID which was increased to 50mg three times a day and Klonopin 0.5 twice a day and Cogentin 1mg twice a day. She states that the patient takes Cogentin for his tremors. She states that the patient was taking previously Lithium 450mg daily at bedtime, Lithium 300mg am, Seroquel 300mg at night, Klonopin 0.25 at night, Depakote 1000mg day and Depakote 1500mg at night, Haldol Injection 100mg once a month 1 ml last given 2/11/20. He was receiving Xanax at some point medication was reduced to only Seroquel 50 BID which was increased to 50mg three times a day and Klonopin 0.5 twice a day and Cogentin 1mg twice a day. She states that the patient takes Cogentin for his tremors. She states that the patient was taking previously Lithium 450mg daily at bedtime, Lithium 300mg am, Seroquel 300mg at night, Klonopin 0.25 at night, Depakote 1000mg day and Depakote 1500mg at night, Haldol Injection 1mL once a month 1 ml last given 2/11/20. He was receiving Xanax at some point

## 2020-02-25 NOTE — ED BEHAVIORAL HEALTH ASSESSMENT NOTE - AXIS III
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

## 2020-02-25 NOTE — H&P ADULT - NSHPPHYSICALEXAM_GEN_ALL_CORE
PHYSICAL EXAM:  GENERAL: NAD, sleeping in stretcher, arousable  HEAD:  Atraumatic, Normocephalic  EYES: EOMI, PERRLA, conjunctiva and sclera clear  NECK: Supple, No JVD  CHEST/LUNG: Clear to auscultation bilaterally; No wheezes, rales or rhonchi  HEART: Regular rate and rhythm; No murmurs, rubs, or gallops, (+)S1, S2  ABDOMEN: Soft, Nontender, Nondistended; Normal Bowel sounds   EXTREMITIES:  2+ Peripheral Pulses, No clubbing, cyanosis, or edema  PSYCH: Aox1, slightly somnolent, no SI or hallucinations  NEUROLOGY: moves all extremities spontaneously, intermittently complied with strength exam. sensation intact b/l to light touch.  SKIN: No rashes or lesions
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
## 2020-02-25 NOTE — H&P ADULT - PROBLEM SELECTOR PLAN 4
on lantus 25u qhs and trulicity. bmp sugar of 124, will start lantus 15u for now and uptitrate. start sliding scale
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
## 2020-02-25 NOTE — ED BEHAVIORAL HEALTH ASSESSMENT NOTE - CURRENT MEDICATION
Seroquel 50 TID  Klonopin 0.5 BID   Cogentin 1mg BID Seroquel 50 TID, Klonopin 1mg BID, Cogentin 1mg BID, melatonin 6mg qhs, Xanax 0.5mg BID?    Metformin 1000mg BID, Trulicity 1.5mg/0.5mL- 1.5mg subQ Every Tuesday at 10am, Aspirin 81mg chewable tablet daily, Basaglar Kwik Pen U-100 100unit/mL (3mL) - 25 units daily at bedtime, latanoprost 0.005% - 1 drop in each eye daily QHS, levothyroxine 25mcg daily, Metoprolol succinate ER 150mg daily, Mysoline 50mg QHS, famotidine 40mg qhs, atorvastatin 80mg qhs, MRCU patch Q3 days (start 2/24/20) Seroquel 50 TID, Klonopin 1mg BID, Cogentin 1mg BID, melatonin 6mg qhs, Xanax 0.5mg BID?, Haldol Dec 100mg IM given 2/11/20    Metformin 1000mg BID, Trulicity 1.5mg/0.5mL- 1.5mg subQ Every Tuesday at 10am, Aspirin 81mg chewable tablet daily, Basaglar Kwik Pen U-100 100unit/mL (3mL) - 25 units daily at bedtime, latanoprost 0.005% - 1 drop in each eye daily QHS, levothyroxine 25mcg daily, Metoprolol succinate ER 150mg daily, Mysoline 50mg QHS, famotidine 40mg qhs, atorvastatin 80mg qhs, MRCU patch Q3 days (start 2/24/20) Seroquel 50 TID, Klonopin 1mg BID, Cogentin 1mg BID, melatonin 6mg qhs, Xanax 0.5mg BID?, Haldol Dec 1mL IM given 2/11/20    Metformin 1000mg BID, Trulicity 1.5mg/0.5mL- 1.5mg subQ Every Tuesday at 10am, Aspirin 81mg chewable tablet daily, Basaglar Kwik Pen U-100 100unit/mL (3mL) - 25 units daily at bedtime, latanoprost 0.005% - 1 drop in each eye daily QHS, levothyroxine 25mcg daily, Metoprolol succinate ER 150mg daily, Mysoline 50mg QHS, famotidine 40mg qhs, atorvastatin 80mg qhs, MRCU patch Q3 days (start 2/24/20)
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
## 2020-02-25 NOTE — ED ADULT TRIAGE NOTE - CHIEF COMPLAINT QUOTE
[facility], sent in for admission to [facility] 2/2 increased agression. PMH schizophrenia and bipolar.

## 2020-02-25 NOTE — H&P ADULT - PROBLEM SELECTOR PLAN 2
unclear diagnosis, per family has been paranoid for a while, no hallucinations for now. per psych start prn zyprexa

## 2020-02-25 NOTE — ED PROVIDER NOTE - CARE PLAN
Principal Discharge DX:	AMS (altered mental status)  Secondary Diagnosis:	Deferred condition on axis II  Secondary Diagnosis:	Dementia  Secondary Diagnosis:	Delusional disorder  Secondary Diagnosis:	Agitation

## 2020-02-25 NOTE — H&P ADULT - PROBLEM SELECTOR PLAN 6
was on ?valproic acid, but per records discontinued by nursing home physician possibly due to oversedation? unclear seizure history, will need to clarify with family

## 2020-02-25 NOTE — ED BEHAVIORAL HEALTH ASSESSMENT NOTE - SUMMARY
Pt. is a 69 Y.O.  male domiciled at skilled nursing facility and on disability. PPHx of dementia and various unclear dx of bipolar disorder, schizoaffective disorder and ALISHA. Hx of admission to [facility] hospitalization approximately 7 years ago and recently admitted to [facility] Floor 2/13-2/21 2020 for medication changes. No documented or reported Hx of psychiatric diagnoses or treatment prior to that hospitalization. No reported/documented Hx of SA.  Hx of opiate (heroin/pills) dependence with 30 yrs of MAT with methadone. One reported admission to drug rehab prior to state hospitalization. 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 Pt. BIB by EMS from nursing home for increased agitation and physical aggression towards staff with the goal of medication adjustments for behavioral disturbances. Pt. is a 69 Y.O.  male domiciled at skilled nursing facility and on disability. PPHx of dementia and various unclear dx of bipolar disorder, schizoaffective disorder and ALISHA. Hx of admission to [facility] hospitalization approximately 7 years ago and recently admitted to [facility] Floor 2/13-2/21 2020 for medication changes. No documented or reported Hx of psychiatric diagnoses or treatment prior to [facility] hospitalization. No reported/documented Hx of SA.  Hx of opiate (heroin/pills) dependence with 30 yrs of MAT with methadone. One reported admission to drug rehab prior to state hospitalization. 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 Pt. BIB by EMS from nursing home for increased agitation and physical aggression towards staff with the goal of medication adjustments for behavioral disturbances. Pt. is a 69 Y.O.  male domiciled at skilled nursing facility and on disability. PPHx of dementia and various unclear dx of bipolar disorder, schizoaffective disorder and ALISHA. Hx of admission to [facility] hospitalization approximately 7 years ago and recently admitted to [facility] Floor 2/13-2/21 2020 for medication changes. No documented or reported Hx of psychiatric diagnoses or treatment prior to [facility] hospitalization. No reported/documented Hx of SA.  Hx of opiate (heroin/pills) dependence with 30 yrs of MAT with methadone. One reported admission to drug rehab prior to state hospitalization. 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 Pt. BIB by EMS from nursing home for increased agitation and physical aggression towards staff with the goal of medication adjustments for behavioral disturbances.    Patient presents to the ER in the context of increased agitation and aggression toward staff after medication adjustments. Patient per collateral has also been falling, falling out of bed, incontinent and had a decline in his ability to ambulate and care for self. There needs to be thorough medical workup to determine if there is medical etiology of presentation. Disposition to be determined. Pt. is a 69 Y.O.  male domiciled at skilled nursing facility and on disability. PPHx of dementia and various unclear dx of bipolar disorder, schizoaffective disorder and ALISHA. Hx of admission to [facility] hospitalization approximately 7 years ago and recently admitted to [facility] Floor 2/13-2/21 2020 for medication changes. No documented or reported Hx of psychiatric diagnoses or treatment prior to [facility] hospitalization. No reported/documented Hx of SA.  Hx of opiate (heroin/pills) dependence with 30 yrs of MAT with methadone. One reported admission to drug rehab prior to state hospitalization. 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 Pt. BIB by EMS from nursing home for increased agitation and physical aggression towards staff with the goal of medication adjustments for behavioral disturbances.    Patient presents to the ER in the context of increased agitation and aggression toward staff after medication adjustments. Patient per collateral has also been falling, falling out of bed, incontinent and had a decline in his ability to ambulate and care for self. There needs to be thorough medical workup to determine if there is medical etiology of presentation. Admit to medicine due to medical decline- weakness, falling, incontinence for further medical workup and psychiatry CL follow up.     Recommend 1:1. Pt. is a 69 Y.O.  male domiciled at skilled nursing facility and on disability. PPHx of dementia and various unclear dx of bipolar disorder, schizoaffective disorder and ALISHA. Hx of admission to [facility] hospitalization approximately 7 years ago and recently admitted to [facility] Floor 2/13-2/21 2020 for medication changes. No documented or reported Hx of psychiatric diagnoses or treatment prior to [facility] hospitalization. No reported/documented Hx of SA.  + history of aggression in NH. Hx of opiate (heroin/pills) dependence with 30 yrs of MAT with methadone. One reported admission to drug rehab prior to state hospitalization. 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 Pt. BIB by EMS from nursing home for increased agitation and physical aggression towards staff with the goal of medication adjustments for behavioral disturbances.     Patient presents to the ER in the context of increased agitation and aggression toward staff after medication adjustments. Patient per collateral has also been falling, falling out of bed, incontinent and had a decline in his ability to ambulate and care for self. There needs to be thorough medical workup to determine if there is medical etiology of presentation. Admit to medicine due to medical decline- weakness, falling, incontinence for further medical workup and psychiatry CL follow up.     Recommend 1:1.

## 2020-02-25 NOTE — ED BEHAVIORAL HEALTH ASSESSMENT NOTE - ACTIVATING EVENTS/STRESSORS
Change in provider or treatment (i.e., medications, psychotherapy, milieu)/Recent inpatient discharge

## 2020-02-25 NOTE — H&P ADULT - ASSESSMENT
70 y/o male with ?hepatitis, HTN, hld, DM, bipolar d/o, ?dementia, ?schizophrenia who was sent from nursing home for psychiatric evaluation and medication adjustment due to extreme agitation. To be admitted for further workup

## 2020-02-25 NOTE — ED PROVIDER NOTE - OBJECTIVE STATEMENT
This is a  69 yr old M, pmh hepatitis, htn, hld, dm, bipolar disorder with ams, increased sudden physical deterioration and extreme agitation. Pt sent in from [facility] for psychiatric admission and medication adjustment. Pt upon arrival calm and cooperative, a&o x2, with fine tremors to his upper extremities. Pt states he is here due to his stomach pain for the last 4 month. Denies any n/v/c and diarrhea. Denies SI/HI/AH/VH. Denies SOB, fever, chills, chest  discomfort. Denies recent use of alcohol or illicit drug. This is a  69 yr old M, pmh hepatitis, htn, hld, dm, bipolar disorder with ams, increased  physical deterioration and extreme agitation. Pt sent in from [facility] for psychiatric admission and medication adjustment. Pt upon arrival calm and cooperative, a&o x2, with fine tremors to his upper extremities. Pt states he is here due to his stomach pain for the last 4 month. Denies any n/v/c and diarrhea. Denies SI/HI/AH/VH. Denies SOB, fever, chills, chest  discomfort. Denies recent use of alcohol or illicit drug.

## 2020-02-25 NOTE — ED ADULT TRIAGE NOTE - BP NONINVASIVE SYSTOLIC (MM HG)
Cardiology Consultation    Consultation Date:  5/6/2019    Referring Physician:  Temo Seth MD    Primary Physician:  Joshua aVnn MD       Reason for Consult: Chest pain    History of Present Illness:  Gerry Robison is a 65 year old male   with a PMH significant for CAD, HTN, and CASSIDY.      He presented to the ED with reports of intermittent left sided chest pain.    Pain started several weeks ago but continued to get worse. Last Friday he started having worse left-sided chest pain. This has happened multiple times. He apparently called my office to get an appointment on Wednesday that is in 2 days however his pain got worse after that telephone call and he decided to come to the emergency room.    Pain is sharp in nature but less severe than the pain he did have back in 2010 when he presented with anterior wall myocardial infarction.  He states that the nature of pain is similar but severity is less.    He does not have any associated dyspnea. He does describe an \"almost\" nauseated feeling today at work and comments that he just felt unwell. No vomiting or abdominal pain. No dizziness or syncope. No difficulties with voiding.      He quit smoking many years ago.  He has not consumed ETOH for > 30 years.      VSS and no hypoxia.  ECG SR.  CXR: Minimal streaky pulmonary opacities could be artifactual. Low-grade bronchitis or interstitial pneumonitis is not excluded. There is no pleural effusion. There is no focal lobar consolidation or pneumonia. Cardiac silhouette is normal.  CBC and Chem 8 unremarkable.  Troponin normal.      Past Medical History:    Active Ambulatory Problems     Diagnosis Date Noted   • Pure hypercholesterolemia 08/06/2013   • Encounter for long-term (current) use of other medications 08/06/2013   • CAD, S/P LAD stent for AWMI 08/06/2013   • Elevated PSA 03/10/2015   • Complex sleep apnea syndrome 07/11/2016     Resolved Ambulatory Problems     Diagnosis Date Noted   • CASSIDY  (obstructive sleep apnea) 02/26/2016   • Severe obstructive sleep apnea 06/16/2016     Past Medical History:   Diagnosis Date   • CAD (coronary artery disease)    • Dyslipidemia    • Elevated PSA    • HTN (hypertension)    • Inguinal hernia    • Myocardial infarct (CMS/HCC) 06/05/2010   • Sleep apnea        Current Medications:  • sodium chloride (PF)  2 mL Injection 2 times per day   • enoxaparin (LOVENOX) injection  40 mg Subcutaneous Q Evening   • [START ON 5/7/2019] aspirin  81 mg Oral Daily   • [START ON 5/7/2019] vitamin - therapeutic multivitamins w/minerals  1 tablet Oral Daily   • metoPROLOL tartrate  12.5 mg Oral 2 times per day   • [START ON 5/7/2019] simvastatin  80 mg Oral Daily     • sodium chloride 0.9% infusion         Social History:   Social History     Socioeconomic History   • Marital status: /Civil Union     Spouse name: Not on file   • Number of children: Not on file   • Years of education: Not on file   • Highest education level: Not on file   Occupational History   • Not on file   Social Needs   • Financial resource strain: Not on file   • Food insecurity:     Worry: Not on file     Inability: Not on file   • Transportation needs:     Medical: Not on file     Non-medical: Not on file   Tobacco Use   • Smoking status: Former Smoker     Packs/day: 1.00     Years: 20.00     Pack years: 20.00     Types: Cigarettes   • Smokeless tobacco: Never Used   Substance and Sexual Activity   • Alcohol use: No     Alcohol/week: 0.0 oz   • Drug use: No   • Sexual activity: Not on file   Lifestyle   • Physical activity:     Days per week: Not on file     Minutes per session: Not on file   • Stress: Not on file   Relationships   • Social connections:     Talks on phone: Not on file     Gets together: Not on file     Attends Oriental orthodox service: Not on file     Active member of club or organization: Not on file     Attends meetings of clubs or organizations: Not on file     Relationship status: Not on file    • Intimate partner violence:     Fear of current or ex partner: Not on file     Emotionally abused: Not on file     Physically abused: Not on file     Forced sexual activity: Not on file   Other Topics Concern   •  Service No   • Blood Transfusions No   • Caffeine Concern Yes     Comment: 1 cup a day in AM   • Occupational Exposure Yes     Comment: works in auto body [employer]    • Hobby Hazards Not Asked   • Sleep Concern Yes     Comment: uses a CPAP nightly   • Stress Concern Not Asked   • Weight Concern Not Asked   • Special Diet Not Asked   • Back Care Not Asked   • Exercise Yes     Comment: rides bike, walks 3xs a week, rides bike to work   • Bike Helmet Yes   • Seat Belt Not Asked   • Self-Exams Not Asked   Social History Narrative   • Not on file        Family History:    Family History   Problem Relation Age of Onset   • Diabetes Brother    • Cancer, Kidney Brother    • Systemic Lupus Erythematosus Sister        Review of Systems:   Constitutional:  - fever, - chills, - fatigue  Eyes:  - acute visual disturbance  Ears, nose, mouth, throat, and face: - epistaxis, - nasal congestion, - sore throat  Respiratory: - dyspnea upon exertion, - PND, - orthopnea, - cough, - wheezing   Cardiovascular: + chest pain, - palpitations, - syncope, - edema, - claudication  Gastrointestinal: + nausea, - vomiting, - melena, - heartburn  Genitourinary: - dysuria, - hematuria  Integumentary: - non-healing skin ulcers  Hematologic: - bleeding, - excessive ecchymosis  Musculoskeletal: - back pain, - joint pain  Neurological: - dizziness, - disorientation, - peripheral neuropathy  Behavioral/Psych: - anxiety, - depression  Endocrine: - polyuria, - polydipsia    Physical Examination:   Temp:  [98.3 °F (36.8 °C)-98.5 °F (36.9 °C)] 98.3 °F (36.8 °C)  Pulse:  [56-74] 56  Resp:  [18-22] 18  BP: (104-148)/(59-72) 125/72    Visit Vitals  /72 (BP Location: [facility], Patient Position: Semi-Ramírez's)   Pulse 56   Temp 98.3 °F (36.8 °C)  (Oral)   Resp 18   Ht 5' 8\" (1.727 m)   Wt 74.7 kg   SpO2 97%   BMI 25.04 kg/m²       General:  Patient is alert and oriented x3.  Appears to be in no acute distress.  HEENT:  No xanthelasma or conjunctival pallor. No eye discharge.  Neck:  No thyromegaly or tenderness; no jugular venous distention; carotid upstrokes are normal without bruits.  Heart:  Normal S1 and S2.  There are no cardiac murmurs, rubs or gallops.  Lungs:  Clear to auscultation without wheezes, ronchi or rales. No local tenderness.  Abdomen:  Soft and non-tender.  There are no palpable masses or hepatoslenomegaly. Bowel sounds are normal.  Extremity:  No cyanosis, clubbing or edema.  Neurologic:  Grossly normal without any focal deficits. Normal speech.  Skin: Normal temperature to touch, no scratch marks, no bruises.    Relevant Labs:   Recent Labs   Lab 05/06/19  0841   WBC 5.9   RBC 4.59   HGB 15.0   HCT 43.3   MCV 94.3   MCH 32.7   MCHC 34.6          No results found    Recent Labs   Lab 08/17/18  0701   CHOLESTEROL 129   HDL 51   TRIGLYCERIDE 38   CALCULATED LDL 70   CALCULATED NON HDL 78     Recent Labs   Lab 05/06/19  1059   MG 2.0   TSH 7.449*       Recent Labs   Lab 05/06/19  1059   AST/SGOT 18       CARDIAC MARKERS:   Recent Labs   Lab 05/06/19  1425 05/06/19  1059 05/06/19  0841   TROPONIN I <0.02 <0.02 <0.02       Telemetry:  Normal sinus rhythm      Best Practice Box     AMI WITH Heart Failure with Reduced LVEF (<40%)?    no    AMI?  No    Heart Failure with Reduced LVEF (< 40%)?  No      Impression:  Unstable angina. He has had more than one episode of chest pain within the last 24 hours. He is on baby aspirin. He is known to have coronary artery disease. We discussed stress test versus cardiac catheterization. He wants to proceed with cardiac catheterization and possible intervention. Risks benefits and alternatives of been discussed with him. He agrees to proceed.    Wrist access      Thank you very much for allowing me to  participate in the care of your patient.      Carmelita Edwards MD  5/6/2019  3:47 PM     112
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
## 2020-02-25 NOTE — ED BEHAVIORAL HEALTH ASSESSMENT NOTE - DESCRIPTION
Pt. is calm. He has not received medication or had behavioral issues since being admitted.     V/S:  Temp : 97.8    /77  RR 17  Oxygen 98% During course of ED visit patient was calm and cooperative. Patient was not aggressive or violent and did not require PRN medications.    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

## 2020-02-25 NOTE — ED ADULT NURSE NOTE - CHIEF COMPLAINT QUOTE
[facility], sent in for admission to [facility] 2/2 increased agression. PMH schizophrenia and bipolar.

## 2020-02-25 NOTE — H&P ADULT - HISTORY OF PRESENT ILLNESS
68 y/o male with ?hepatitis, HTN, hld, DM, bipolar d/o, ?dementia, ?schizophrenia who was sent from nursing home for psychiatric evaluation and medication adjustment due to extreme agitation. Pt was found today ambulating without assistance with his pants at his ankle and covered in feces. Became combative with staff as they were trying to calm him down. Pt at bedside is a poor historian, slightly somnolent, denying any of these events happening. Per behavior health note, family believes staff have been changing his psyhiatric medications around to calm him down and pt has been more sedated then usual. Pt has had diagnosis of bipolar as well as schizophrenia given to him at nursing home, and that they think his behavior has worsened over the past admission to the home. Now also having urinary incontinence as baseline. Pt denies fever, chills, chest pain, shortness of breath, back pain, abdominal pain. Pt AOx1 to name only. cannot remember the events from today. In paper chart pt has had multiple changes in dosages of seroquel and intermittently starting/stopping lithium, valproic acid, and klonopin/xanax. In the ED

## 2020-02-25 NOTE — ED BEHAVIORAL HEALTH ASSESSMENT NOTE - SUICIDE RISK FACTORS
Agitation/Severe Anxiety/Panic/Psychotic disorder current/past/Mood Disorder current/past/Unable to engage in safety planning/Impulsivity Alcohol/Substance abuse disorders/Mood Disorder current/past/Agitation/Severe Anxiety/Panic/Impulsivity/Unable to engage in safety planning/Psychotic disorder current/past/Recent onset of current/past psychiatric diagnosis Recent onset of current/past psychiatric diagnosis/Impulsivity/Psychotic disorder current/past/Other/Alcohol/Substance abuse disorders/Agitation/Severe Anxiety/Panic/Mood Disorder current/past/Unable to engage in safety planning

## 2020-02-25 NOTE — ED BEHAVIORAL HEALTH ASSESSMENT NOTE - PSYCHIATRIC ISSUES AND PLAN (INCLUDE STANDING AND PRN MEDICATION)
Hold Seroquel- unclear why Depakote was stopped and patient having adverse effects (incontinence, falling, weakness) and patient already received Haldol dec 100mg IM 2/11/20; Continue Klonopin 1mg BID, Cogentin 1mg BID, PRN Zyprexa 1.25mg IM for agitation pending QTC under 500 , Hold Seroquel- unclear why Depakote was stopped and patient having adverse effects (incontinence, falling, weakness) and patient already received Haldol dec  2/11/20; Continue Klonopin 1mg BID, Cogentin 1mg BID, PRN Zyprexa 1.25mg IM for agitation pending QTC under 500 . consider lowering or holding Seroquel- patient having adverse effects (incontinence, falling, weakness) and patient already received Haldol dec  2/11/20; Continue Klonopin 1mg BID, Cogentin 1mg BID, PRN Zyprexa 1.25mg IM for agitation pending QTC under 500 .

## 2020-02-25 NOTE — ED BEHAVIORAL HEALTH ASSESSMENT NOTE - OTHER
Juan Daley (Brother)[phone];  Petra Potter (sister-in law) [phone] Skilled Nursing Facility Recently discharged from [place] for medication adjustments not evaluated, in bed mumbling poor historian, concrete Juan Daley (Brother)[phone];  Petra Potter (sister-in law) [phone], NP at NH dementia in bed; reported to be weak
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
## 2020-02-25 NOTE — H&P ADULT - NSICDXPASTMEDICALHX_GEN_ALL_CORE_FT
PAST MEDICAL HISTORY:  Bipolar 1 disorder     Depression     Diabetes     Edema     Hepatitis     HTN (hypertension)     Hyperlipidemia     Schizophrenia     Seizure disorder

## 2020-02-25 NOTE — ED ADULT NURSE NOTE - OBJECTIVE STATEMENT
pt from [facility], long term care, sent in for admission to [facility] 2/2 increased agression. PMH schizophrenia and bipolar.  Patient brought to . Pt calm and cooperative. Pt evaluated by MD/MAXIM. Labs drawn and sent. Pt waiting for results and disposition. Pt calm and cooperative. Testing in progress. Waiting for bed assignment.  DSBandar<RN

## 2020-02-25 NOTE — H&P ADULT - PROBLEM SELECTOR PLAN 3
no longer on lithium? levels low. Per psych starting klonopin, cogentin for now. Psych recs appreciated no longer on lithium? levels low. Currently not manic, Psych recs appreciated, starting home klonopin for now.

## 2020-02-26 LAB
ALBUMIN SERPL ELPH-MCNC: 3.6 G/DL — SIGNIFICANT CHANGE UP (ref 3.3–5)
ALP SERPL-CCNC: 102 U/L — SIGNIFICANT CHANGE UP (ref 40–120)
ALT FLD-CCNC: 65 U/L — HIGH (ref 4–41)
ANION GAP SERPL CALC-SCNC: 16 MMO/L — HIGH (ref 7–14)
AST SERPL-CCNC: 56 U/L — HIGH (ref 4–40)
BASOPHILS # BLD AUTO: 0.03 K/UL — SIGNIFICANT CHANGE UP (ref 0–0.2)
BASOPHILS NFR BLD AUTO: 0.5 % — SIGNIFICANT CHANGE UP (ref 0–2)
BILIRUB DIRECT SERPL-MCNC: < 0.2 MG/DL — SIGNIFICANT CHANGE UP (ref 0.1–0.2)
BILIRUB SERPL-MCNC: 0.5 MG/DL — SIGNIFICANT CHANGE UP (ref 0.2–1.2)
BUN SERPL-MCNC: 15 MG/DL — SIGNIFICANT CHANGE UP (ref 7–23)
C DIFF TOX GENS STL QL NAA+PROBE: SIGNIFICANT CHANGE UP
CALCIUM SERPL-MCNC: 9.3 MG/DL — SIGNIFICANT CHANGE UP (ref 8.4–10.5)
CHLORIDE SERPL-SCNC: 104 MMOL/L — SIGNIFICANT CHANGE UP (ref 98–107)
CHOLEST SERPL-MCNC: 135 MG/DL — SIGNIFICANT CHANGE UP (ref 120–199)
CO2 SERPL-SCNC: 23 MMOL/L — SIGNIFICANT CHANGE UP (ref 22–31)
CREAT SERPL-MCNC: 0.94 MG/DL — SIGNIFICANT CHANGE UP (ref 0.5–1.3)
EOSINOPHIL # BLD AUTO: 0.18 K/UL — SIGNIFICANT CHANGE UP (ref 0–0.5)
EOSINOPHIL NFR BLD AUTO: 2.7 % — SIGNIFICANT CHANGE UP (ref 0–6)
FOLATE SERPL-MCNC: 13.1 NG/ML — SIGNIFICANT CHANGE UP (ref 4.7–20)
GLUCOSE BLDC GLUCOMTR-MCNC: 130 MG/DL — HIGH (ref 70–99)
GLUCOSE BLDC GLUCOMTR-MCNC: 136 MG/DL — HIGH (ref 70–99)
GLUCOSE BLDC GLUCOMTR-MCNC: 167 MG/DL — HIGH (ref 70–99)
GLUCOSE SERPL-MCNC: 113 MG/DL — HIGH (ref 70–99)
HBA1C BLD-MCNC: 5.4 % — SIGNIFICANT CHANGE UP (ref 4–5.6)
HCT VFR BLD CALC: 42.4 % — SIGNIFICANT CHANGE UP (ref 39–50)
HCV AB S/CO SERPL IA: 15.24 S/CO — HIGH (ref 0–0.99)
HCV AB SERPL-IMP: REACTIVE — SIGNIFICANT CHANGE UP
HDLC SERPL-MCNC: 35 MG/DL — SIGNIFICANT CHANGE UP (ref 35–55)
HGB BLD-MCNC: 13.1 G/DL — SIGNIFICANT CHANGE UP (ref 13–17)
IMM GRANULOCYTES NFR BLD AUTO: 0.5 % — SIGNIFICANT CHANGE UP (ref 0–1.5)
LIPID PNL WITH DIRECT LDL SERPL: 72 MG/DL — SIGNIFICANT CHANGE UP
LYMPHOCYTES # BLD AUTO: 1.8 K/UL — SIGNIFICANT CHANGE UP (ref 1–3.3)
LYMPHOCYTES # BLD AUTO: 27.1 % — SIGNIFICANT CHANGE UP (ref 13–44)
MAGNESIUM SERPL-MCNC: 2.2 MG/DL — SIGNIFICANT CHANGE UP (ref 1.6–2.6)
MCHC RBC-ENTMCNC: 28.6 PG — SIGNIFICANT CHANGE UP (ref 27–34)
MCHC RBC-ENTMCNC: 30.9 % — LOW (ref 32–36)
MCV RBC AUTO: 92.6 FL — SIGNIFICANT CHANGE UP (ref 80–100)
MONOCYTES # BLD AUTO: 0.5 K/UL — SIGNIFICANT CHANGE UP (ref 0–0.9)
MONOCYTES NFR BLD AUTO: 7.5 % — SIGNIFICANT CHANGE UP (ref 2–14)
NEUTROPHILS # BLD AUTO: 4.09 K/UL — SIGNIFICANT CHANGE UP (ref 1.8–7.4)
NEUTROPHILS NFR BLD AUTO: 61.7 % — SIGNIFICANT CHANGE UP (ref 43–77)
NRBC # FLD: 0 K/UL — SIGNIFICANT CHANGE UP (ref 0–0)
PLATELET # BLD AUTO: 223 K/UL — SIGNIFICANT CHANGE UP (ref 150–400)
PMV BLD: 10.2 FL — SIGNIFICANT CHANGE UP (ref 7–13)
POTASSIUM SERPL-MCNC: 3.7 MMOL/L — SIGNIFICANT CHANGE UP (ref 3.5–5.3)
POTASSIUM SERPL-SCNC: 3.7 MMOL/L — SIGNIFICANT CHANGE UP (ref 3.5–5.3)
PROT SERPL-MCNC: 8 G/DL — SIGNIFICANT CHANGE UP (ref 6–8.3)
RBC # BLD: 4.58 M/UL — SIGNIFICANT CHANGE UP (ref 4.2–5.8)
RBC # FLD: 14.9 % — HIGH (ref 10.3–14.5)
SODIUM SERPL-SCNC: 143 MMOL/L — SIGNIFICANT CHANGE UP (ref 135–145)
TRIGL SERPL-MCNC: 173 MG/DL — HIGH (ref 10–149)
VIT B12 SERPL-MCNC: 424 PG/ML — SIGNIFICANT CHANGE UP (ref 200–900)
WBC # BLD: 6.63 K/UL — SIGNIFICANT CHANGE UP (ref 3.8–10.5)
WBC # FLD AUTO: 6.63 K/UL — SIGNIFICANT CHANGE UP (ref 3.8–10.5)

## 2020-02-26 PROCEDURE — 99232 SBSQ HOSP IP/OBS MODERATE 35: CPT

## 2020-02-26 RX ORDER — METOPROLOL TARTRATE 50 MG
150 TABLET ORAL
Qty: 0 | Refills: 0 | DISCHARGE

## 2020-02-26 RX ORDER — LATANOPROST 0.05 MG/ML
1 SOLUTION/ DROPS OPHTHALMIC; TOPICAL
Qty: 0 | Refills: 0 | DISCHARGE

## 2020-02-26 RX ORDER — NITROGLYCERIN 6.5 MG
1 CAPSULE, EXTENDED RELEASE ORAL
Qty: 0 | Refills: 0 | DISCHARGE

## 2020-02-26 RX ORDER — ATORVASTATIN CALCIUM 80 MG/1
1 TABLET, FILM COATED ORAL
Qty: 0 | Refills: 0 | DISCHARGE

## 2020-02-26 RX ORDER — DULAGLUTIDE 4.5 MG/.5ML
0 INJECTION, SOLUTION SUBCUTANEOUS
Qty: 0 | Refills: 0 | DISCHARGE

## 2020-02-26 RX ORDER — BRIMONIDINE TARTRATE 2 MG/MG
1 SOLUTION/ DROPS OPHTHALMIC
Qty: 0 | Refills: 0 | DISCHARGE

## 2020-02-26 RX ORDER — LEVOTHYROXINE SODIUM 125 MCG
1 TABLET ORAL
Qty: 0 | Refills: 0 | DISCHARGE

## 2020-02-26 RX ORDER — DULAGLUTIDE 4.5 MG/.5ML
0.5 INJECTION, SOLUTION SUBCUTANEOUS
Qty: 0 | Refills: 0 | DISCHARGE

## 2020-02-26 RX ORDER — METFORMIN HYDROCHLORIDE 850 MG/1
1 TABLET ORAL
Qty: 0 | Refills: 0 | DISCHARGE

## 2020-02-26 RX ORDER — ASPIRIN/CALCIUM CARB/MAGNESIUM 324 MG
1 TABLET ORAL
Qty: 0 | Refills: 0 | DISCHARGE

## 2020-02-26 RX ORDER — INSULIN GLARGINE 100 [IU]/ML
0 INJECTION, SOLUTION SUBCUTANEOUS
Qty: 0 | Refills: 0 | DISCHARGE

## 2020-02-26 RX ORDER — QUETIAPINE FUMARATE 200 MG/1
1 TABLET, FILM COATED ORAL
Qty: 0 | Refills: 0 | DISCHARGE

## 2020-02-26 RX ORDER — LANOLIN ALCOHOL/MO/W.PET/CERES
2 CREAM (GRAM) TOPICAL
Qty: 0 | Refills: 0 | DISCHARGE

## 2020-02-26 RX ORDER — LITHIUM CARBONATE 300 MG/1
150 TABLET, EXTENDED RELEASE ORAL
Qty: 0 | Refills: 0 | DISCHARGE

## 2020-02-26 RX ADMIN — Medication 1 MILLIGRAM(S): at 17:21

## 2020-02-26 RX ADMIN — Medication 1: at 11:59

## 2020-02-26 RX ADMIN — Medication 1 MILLIGRAM(S): at 07:09

## 2020-02-26 RX ADMIN — Medication 25 MICROGRAM(S): at 07:09

## 2020-02-26 RX ADMIN — Medication 100 MILLIGRAM(S): at 07:10

## 2020-02-26 RX ADMIN — ATORVASTATIN CALCIUM 80 MILLIGRAM(S): 80 TABLET, FILM COATED ORAL at 22:17

## 2020-02-26 RX ADMIN — HEPARIN SODIUM 5000 UNIT(S): 5000 INJECTION INTRAVENOUS; SUBCUTANEOUS at 17:21

## 2020-02-26 RX ADMIN — HEPARIN SODIUM 5000 UNIT(S): 5000 INJECTION INTRAVENOUS; SUBCUTANEOUS at 07:09

## 2020-02-26 RX ADMIN — Medication 1 MILLIGRAM(S): at 17:29

## 2020-02-26 RX ADMIN — INSULIN GLARGINE 15 UNIT(S): 100 INJECTION, SOLUTION SUBCUTANEOUS at 22:17

## 2020-02-26 RX ADMIN — Medication 81 MILLIGRAM(S): at 17:21

## 2020-02-26 NOTE — DIETITIAN INITIAL EVALUATION ADULT. - DIET TYPE
consistent carbohydrate (evening snack)/Please consider adding Glucerna Therapeutic Nutrition 240mls 2x daily (440kcals, 20g protein) to optimize nutritional intake.

## 2020-02-26 NOTE — PROGRESS NOTE BEHAVIORAL HEALTH - RISK ASSESSMENT
Risk factors: underlying diagnosis of bipolar disorder vs. schizoaffective disorder. History of past inpatient psychiatric hospitalizations and history of aggression/agitation. History of opiate use disorder.     Protective Factors: No documented past SAs and no current reported SI/HI.     At this time, patient's risk for dangerous behavior is elevated by disorganized/agitated behavior and confusion with recent falls and incontinence.

## 2020-02-26 NOTE — DIETITIAN INITIAL EVALUATION ADULT. - ENERGY NEEDS
Ht: 172.7cm Wt: 94.3 admit/dosing BMI: 31.6  Edema: 1+ edema to b/l LE  Pressure Injuries: None noted

## 2020-02-26 NOTE — DIETITIAN INITIAL EVALUATION ADULT. - ADD RECOMMEND
Please consider bedside swallow evaluation given reports of prolonged feeding time / chronic use of psychotropic medication.

## 2020-02-26 NOTE — PHARMACOTHERAPY INTERVENTION NOTE - COMMENTS
Medication history is complete. Medication list updated in Outpatient Medication Record (OMR). Please call spectra [id number] if you have any questions.

## 2020-02-26 NOTE — PROGRESS NOTE BEHAVIORAL HEALTH - NSBHFUPINTERVALHXFT_PSY_A_CORE
Patient Patient seen today in bed, sedated, unable to be woken up. Not arousable to stimuli. Patient did not receive PRNs overnight for agitation.

## 2020-02-26 NOTE — DIETITIAN INITIAL EVALUATION ADULT. - PERTINENT MEDS FT
MEDICATIONS  (STANDING):  aspirin  chewable 81 milliGRAM(s) Oral daily  atorvastatin 80 milliGRAM(s) Oral at bedtime  benztropine 1 milliGRAM(s) Oral two times a day  clonazePAM  Tablet 1 milliGRAM(s) Oral every 12 hours  dextrose 5%. 1000 milliLiter(s) (50 mL/Hr) IV Continuous <Continuous>  dextrose 50% Injectable 12.5 Gram(s) IV Push once  dextrose 50% Injectable 25 Gram(s) IV Push once  dextrose 50% Injectable 25 Gram(s) IV Push once  heparin  Injectable 5000 Unit(s) SubCutaneous every 12 hours  insulin glargine Injectable (LANTUS) 15 Unit(s) SubCutaneous at bedtime  insulin lispro (HumaLOG) corrective regimen sliding scale   SubCutaneous three times a day before meals  insulin lispro (HumaLOG) corrective regimen sliding scale   SubCutaneous at bedtime  levothyroxine 25 MICROGram(s) Oral daily  metoprolol succinate  milliGRAM(s) Oral daily    MEDICATIONS  (PRN):  dextrose 40% Gel 15 Gram(s) Oral once PRN Blood Glucose LESS THAN 70 milliGRAM(s)/deciliter  glucagon  Injectable 1 milliGRAM(s) IntraMuscular once PRN Glucose LESS THAN 70 milligrams/deciliter  OLANZapine Injectable 1.25 milliGRAM(s) IntraMuscular every 6 hours PRN agitation

## 2020-02-26 NOTE — PROGRESS NOTE ADULT - PROBLEM SELECTOR PLAN 1
Presented for agitation, but now calm. No fever/chills. No source of infection seen  -suspect decline in cognitive function related to schizophrenia vs bipolar vs dementia?  -CTH with no findigns. Pt aox1. Possibly also from polypharmacy.   -per psych start home benzo, benzotropine, and prn zyprexa  -check b12/folate. TSH wnl  -Neurology consult.MRI Brain.

## 2020-02-26 NOTE — PROGRESS NOTE BEHAVIORAL HEALTH - OTHER
in bed; reported to be weak not evaluated, in bed mumbling poor historian, concrete sedated, not arousable eyes closed unable to assess in bed; unable to assess nonverbal non-verbal asleep, unable to assess

## 2020-02-26 NOTE — DIETITIAN INITIAL EVALUATION ADULT. - PROBLEM SELECTOR PLAN 3
no longer on lithium? levels low. Currently not manic, Psych recs appreciated, starting home klonopin for now.
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
## 2020-02-26 NOTE — PROGRESS NOTE ADULT - SUBJECTIVE AND OBJECTIVE BOX
CIARA BURCIAGA  69y  Male      Patient is a 69y old  Male who presents with a chief complaint of agitation/AMS (2020 21:09)  Patient seen and examined.chart reviewed.Agree with H and P.Admitted with AMS,     REVIEW OF SYSTEMS:  Unable to obtain in detail.no sob,no fever,no cough,no vomiting,no diarrhoea        INTERVAL HPI/OVERNIGHT EVENTS:  T(C): 36.4 (20 @ 20:32), Max: 36.6 (20 @ 23:14)  HR: 84 (-20 @ 20:32) (84 - 97)  BP: 107/77 (20 @ 20:32) (107/ - 144/85)  RR: 18 (-20 @ 20:32) (17 - 18)  SpO2: 99% (20 @ 20:32) (98% - 100%)  Wt(kg): --  I&O's Summary    T(C): 36.4 (-20 @ 20:32), Max: 36.6 (20 @ 23:14)  HR: 84 (20 @ 20:32) (84 - 97)  BP: 107/77 (20 @ 20:32) (107/ - 144/85)  RR: 18 (-20 @ 20:32) (17 - 18)  SpO2: 99% (20 @ 20:32) (98% - 100%)  Wt(kg): --Vital Signs Last 24 Hrs  T(C): 36.4 (2020 20:32), Max: 36.6 (2020 23:14)  T(F): 97.6 (2020 20:32), Max: 97.8 (2020 23:14)  HR: 84 (2020 20:32) (84 - 97)  BP: 107/77 (2020 20:32) (107/77 - 144/85)  BP(mean): --  RR: 18 (2020 20:32) (17 - 18)  SpO2: 99% (2020 20:32) (98% - 100%)    LABS:                        13.1   6.63  )-----------( 223      ( 2020 05:45 )             42.4         143  |  104  |  15  ----------------------------<  113<H>  3.7   |  23  |  0.94    Ca    9.3      2020 05:45  Mg     2.2         TPro  8.0  /  Alb  3.6  /  TBili  0.5  /  DBili  < 0.2  /  AST  56<H>  /  ALT  65<H>  /  AlkPhos  102        Urinalysis Basic - ( 2020 19:30 )    Color: YELLOW / Appearance: CLEAR / S.018 / pH: 6.0  Gluc: NEGATIVE / Ketone: NEGATIVE  / Bili: NEGATIVE / Urobili: NORMAL   Blood: NEGATIVE / Protein: 10 / Nitrite: NEGATIVE   Leuk Esterase: NEGATIVE / RBC: x / WBC x   Sq Epi: x / Non Sq Epi: x / Bacteria: x      CAPILLARY BLOOD GLUCOSE      POCT Blood Glucose.: 136 mg/dL (2020 21:49)  POCT Blood Glucose.: 130 mg/dL (2020 16:23)  POCT Blood Glucose.: 167 mg/dL (2020 11:33)  POCT Blood Glucose.: 132 mg/dL (2020 07:28)  POCT Blood Glucose.: 116 mg/dL (2020 22:59)        Urinalysis Basic - ( 2020 19:30 )    Color: YELLOW / Appearance: CLEAR / S.018 / pH: 6.0  Gluc: NEGATIVE / Ketone: NEGATIVE  / Bili: NEGATIVE / Urobili: NORMAL   Blood: NEGATIVE / Protein: 10 / Nitrite: NEGATIVE   Leuk Esterase: NEGATIVE / RBC: x / WBC x   Sq Epi: x / Non Sq Epi: x / Bacteria: x        PAST MEDICAL & SURGICAL HISTORY:  Schizophrenia  Edema  Depression  Hepatitis  Hyperlipidemia  Seizure disorder  HTN (hypertension)  Bipolar 1 disorder  Diabetes  S/P neck surgery, follow-up exam      MEDICATIONS  (STANDING):  aspirin  chewable 81 milliGRAM(s) Oral daily  atorvastatin 80 milliGRAM(s) Oral at bedtime  benztropine 1 milliGRAM(s) Oral two times a day  clonazePAM  Tablet 1 milliGRAM(s) Oral every 12 hours  dextrose 5%. 1000 milliLiter(s) (50 mL/Hr) IV Continuous <Continuous>  dextrose 50% Injectable 12.5 Gram(s) IV Push once  dextrose 50% Injectable 25 Gram(s) IV Push once  dextrose 50% Injectable 25 Gram(s) IV Push once  heparin  Injectable 5000 Unit(s) SubCutaneous every 12 hours  insulin glargine Injectable (LANTUS) 15 Unit(s) SubCutaneous at bedtime  insulin lispro (HumaLOG) corrective regimen sliding scale   SubCutaneous three times a day before meals  insulin lispro (HumaLOG) corrective regimen sliding scale   SubCutaneous at bedtime  levothyroxine 25 MICROGram(s) Oral daily  metoprolol succinate  milliGRAM(s) Oral daily    MEDICATIONS  (PRN):  dextrose 40% Gel 15 Gram(s) Oral once PRN Blood Glucose LESS THAN 70 milliGRAM(s)/deciliter  glucagon  Injectable 1 milliGRAM(s) IntraMuscular once PRN Glucose LESS THAN 70 milligrams/deciliter  OLANZapine Injectable 1.25 milliGRAM(s) IntraMuscular every 6 hours PRN agitation        RADIOLOGY & ADDITIONAL TESTS:    Imaging Personally Reviewed:  [ ] YES  [ ] NO    Consultant(s) Notes Reviewed:  [x ] YES  [ ] NO    PHYSICAL EXAM:  GENERAL: Alert and awake lying in bed in no distress  HEAD:  Atraumatic, Normocephalic  EYES: EOMI, CLOTILDE, conjunctiva and sclera clear  NECK: Supple, No JVD, Normal thyroid  NERVOUS SYSTEM:  nonfocal,   CHEST/LUNG: Bilateral clear breath sounds, no rhochi, no wheezing, no crepitations,  HEART: Regular rate and rhythm; No murmurs, rubs, or gallops  ABDOMEN: Soft, Nontender, Nondistended; Bowel sounds present  EXTREMITIES:   Peripheral Pulses are palpable, no  edema        Care Discussed with Consultants/Other Providers [ ] YES  [ ] NO      Code Status: [] Full Code [] DNR [] DNI [] Goals of Care:   Disposition: [] ICU [] Stroke Unit [] RCU []PCU []Floor [] Discharge Home         DAWN JimenezP

## 2020-02-26 NOTE — DIETITIAN INITIAL EVALUATION ADULT. - OTHER INFO
RD visited with patient for nutrition consultation triggered by RN profile for BMI < 18.     BMI assessed = 31.6 at this time based on admit/dosing height and weight.      Patient admitted from nursing home for psychiatric workup and medication adjustment. Patient unable to provide diet recall and/or weight hx.  PO intake reported by fair at this time; as per PCA, patient eats very slowly, but is accepting of meals.  Patient noted with loose BMs x 3 this AM.  No reported nausea, vomiting.  Weight and height per transfer paperwork from nursing home: 68 inches / 210 pounds (95.5kg).  Admit weight 2/26/20  - 94.3kg / height 172.7cm.      No noted food allergies.

## 2020-02-26 NOTE — DIETITIAN INITIAL EVALUATION ADULT. - PERTINENT LABORATORY DATA
02-26 Na143 mmol/L Glu 113 mg/dL<H> K+ 3.7 mmol/L Cr  0.94 mg/dL BUN 15 mg/dL 02-26 Alb 3.6 g/dL 02-26 [id number] 5.4 % 02-26 Chol 135 mg/dL LDL 72 mg/dL HDL 35 mg/dL Trig 173 mg/dL<H>    CAPILLARY BLOOD GLUCOSE  POCT Blood Glucose.: 167 mg/dL (26 Feb 2020 11:33)  POCT Blood Glucose.: 132 mg/dL (26 Feb 2020 07:28)  POCT Blood Glucose.: 116 mg/dL (25 Feb 2020 22:59)  POCT Blood Glucose.: 106 mg/dL (25 Feb 2020 22:04)

## 2020-02-27 LAB
ANION GAP SERPL CALC-SCNC: 13 MMO/L — SIGNIFICANT CHANGE UP (ref 7–14)
BUN SERPL-MCNC: 13 MG/DL — SIGNIFICANT CHANGE UP (ref 7–23)
CALCIUM SERPL-MCNC: 8.1 MG/DL — LOW (ref 8.4–10.5)
CHLORIDE SERPL-SCNC: 101 MMOL/L — SIGNIFICANT CHANGE UP (ref 98–107)
CO2 SERPL-SCNC: 22 MMOL/L — SIGNIFICANT CHANGE UP (ref 22–31)
CREAT SERPL-MCNC: 0.88 MG/DL — SIGNIFICANT CHANGE UP (ref 0.5–1.3)
GLUCOSE BLDC GLUCOMTR-MCNC: 105 MG/DL — HIGH (ref 70–99)
GLUCOSE BLDC GLUCOMTR-MCNC: 108 MG/DL — HIGH (ref 70–99)
GLUCOSE BLDC GLUCOMTR-MCNC: 121 MG/DL — HIGH (ref 70–99)
GLUCOSE BLDC GLUCOMTR-MCNC: 131 MG/DL — HIGH (ref 70–99)
GLUCOSE SERPL-MCNC: 94 MG/DL — SIGNIFICANT CHANGE UP (ref 70–99)
HCT VFR BLD CALC: 37.5 % — LOW (ref 39–50)
HCV RNA FLD QL NAA+PROBE: SIGNIFICANT CHANGE UP
HCV RNA SPEC QL PROBE+SIG AMP: NOT DETECTED — SIGNIFICANT CHANGE UP
HGB BLD-MCNC: 12.4 G/DL — LOW (ref 13–17)
MCHC RBC-ENTMCNC: 29.8 PG — SIGNIFICANT CHANGE UP (ref 27–34)
MCHC RBC-ENTMCNC: 33.1 % — SIGNIFICANT CHANGE UP (ref 32–36)
MCV RBC AUTO: 90.1 FL — SIGNIFICANT CHANGE UP (ref 80–100)
NRBC # FLD: 0 K/UL — SIGNIFICANT CHANGE UP (ref 0–0)
PLATELET # BLD AUTO: 172 K/UL — SIGNIFICANT CHANGE UP (ref 150–400)
PMV BLD: 10.1 FL — SIGNIFICANT CHANGE UP (ref 7–13)
POTASSIUM SERPL-MCNC: 3.5 MMOL/L — SIGNIFICANT CHANGE UP (ref 3.5–5.3)
POTASSIUM SERPL-SCNC: 3.5 MMOL/L — SIGNIFICANT CHANGE UP (ref 3.5–5.3)
RBC # BLD: 4.16 M/UL — LOW (ref 4.2–5.8)
RBC # FLD: 14.3 % — SIGNIFICANT CHANGE UP (ref 10.3–14.5)
SODIUM SERPL-SCNC: 136 MMOL/L — SIGNIFICANT CHANGE UP (ref 135–145)
SPECIMEN SOURCE: SIGNIFICANT CHANGE UP
WBC # BLD: 7.23 K/UL — SIGNIFICANT CHANGE UP (ref 3.8–10.5)
WBC # FLD AUTO: 7.23 K/UL — SIGNIFICANT CHANGE UP (ref 3.8–10.5)

## 2020-02-27 PROCEDURE — 99232 SBSQ HOSP IP/OBS MODERATE 35: CPT

## 2020-02-27 RX ORDER — LANOLIN ALCOHOL/MO/W.PET/CERES
3 CREAM (GRAM) TOPICAL ONCE
Refills: 0 | Status: COMPLETED | OUTPATIENT
Start: 2020-02-27 | End: 2020-02-28

## 2020-02-27 RX ORDER — CLONAZEPAM 1 MG
1 TABLET ORAL EVERY 12 HOURS
Refills: 0 | Status: DISCONTINUED | OUTPATIENT
Start: 2020-02-27 | End: 2020-02-28

## 2020-02-27 RX ADMIN — Medication 25 MICROGRAM(S): at 05:16

## 2020-02-27 RX ADMIN — HEPARIN SODIUM 5000 UNIT(S): 5000 INJECTION INTRAVENOUS; SUBCUTANEOUS at 05:16

## 2020-02-27 RX ADMIN — Medication 100 MILLIGRAM(S): at 05:16

## 2020-02-27 RX ADMIN — Medication 1 MILLIGRAM(S): at 16:46

## 2020-02-27 RX ADMIN — Medication 81 MILLIGRAM(S): at 16:47

## 2020-02-27 RX ADMIN — OLANZAPINE 1.25 MILLIGRAM(S): 15 TABLET, FILM COATED ORAL at 20:26

## 2020-02-27 RX ADMIN — Medication 1 MILLIGRAM(S): at 05:16

## 2020-02-27 RX ADMIN — ATORVASTATIN CALCIUM 80 MILLIGRAM(S): 80 TABLET, FILM COATED ORAL at 22:13

## 2020-02-27 RX ADMIN — HEPARIN SODIUM 5000 UNIT(S): 5000 INJECTION INTRAVENOUS; SUBCUTANEOUS at 16:47

## 2020-02-27 RX ADMIN — INSULIN GLARGINE 15 UNIT(S): 100 INJECTION, SOLUTION SUBCUTANEOUS at 22:13

## 2020-02-27 NOTE — PROGRESS NOTE BEHAVIORAL HEALTH - NSBHCHARTREVIEWINVESTIGATE_PSY_A_CORE FT
< from: 12 Lead ECG (02.25.20 @ 16:03) >    Ventricular Rate 105 BPM  Atrial Rate 105 BPM  P-R Interval 152 ms  QRS Duration 82 ms  Q-T Interval 356 ms  QTC Calculation(Bezet) 470 ms  P Axis 106 degrees  R Axis 35 degrees  T Axis 42 degrees  Diagnosis Line Sinus tachycardia  Otherwise normal ECG
< from: 12 Lead ECG (02.25.20 @ 16:03) >    Ventricular Rate 105 BPM  Atrial Rate 105 BPM  P-R Interval 152 ms  QRS Duration 82 ms  Q-T Interval 356 ms  QTC Calculation(Bezet) 470 ms  P Axis 106 degrees  R Axis 35 degrees  T Axis 42 degrees  Diagnosis Line Sinus tachycardia  Otherwise normal ECG

## 2020-02-27 NOTE — PROGRESS NOTE BEHAVIORAL HEALTH - NSBHFUPINTERVALHXFT_PSY_A_CORE
Patient seen today in bed, now alert and verbal. He did not require PRNs overnight. He says he is feeling "not too good" and is having "racing thoughts going through [his] mind" because he wants to leave the hospital. 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 He is AAOx2 - he knows the year is 2020 but does not know the month (when asked month, says "20"), and he knows he is in a hospital but thinks this is [place]. Patient had notable EPS and is seen shaking, with tremors in hands and also in mouth, with involuntary lip movements. On physical exam, cogwheeling of upper extremities is noted. Patient says that he indeed does receive Haldol Dec ECKERT, but is unable to provide info about when he started receiving ECKERT or the frequency of his injections. Patient denies SI/HI and denies AVH/delusions. He reports sleep it "not too good" and appetite is "so-so." He asks about his brother and son, Eligio and Juan Daley, and asks if they will visit him today, and if he can go home. Patient seen today in bed, now alert and verbal. He did not require PRNs overnight. He says he is feeling "not too good"  because he wants to leave the hospital. 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 He is AAOx2 - he knows the year is 2020 but does not know the month (when asked month, says "20"), and he knows he is in a hospital but thinks this is [place]. Patient had notable EPS and is seen shaking, with tremors in hands and also in mouth, with involuntary lip movements. On physical exam, cogwheeling of upper extremities is noted. Patient says that he indeed does receive Haldol Dec ECKERT few days ago,  but is unable to provide info about when he started receiving ECKERT or the frequency of his injections. Patient denies SI/HI and denies AVH/delusions. He reports sleep it "not too good" and appetite is "so-so." He asks about his brother and son, Eligio and Juan Daley, and asks if they will visit him today, and if he can go home.

## 2020-02-27 NOTE — CHART NOTE - NSCHARTNOTEFT_GEN_A_CORE
Neuro Dr. Vazquez consulted. Awaiting recommendations.
Recurrent episodes, unclear cause  - Plan is for colonoscopy in November.  Pt follows with [facility] GI clinic. notes reviewed  CT-a/p unremarkable

## 2020-02-27 NOTE — CONSULT NOTE ADULT - SUBJECTIVE AND OBJECTIVE BOX
Patient is a 69y old  Male who presents with a chief complaint of agitation/AMS (2020 22:33)    Excerpt from H&P,  70 y/o male with ?hepatitis, HTN, hld, DM, bipolar d/o, ?dementia, ?schizophrenia who was sent from nursing home for psychiatric evaluation and medication adjustment due to extreme agitation. Pt was found today ambulating without assistance with his pants at his ankle and covered in feces. Became combative with staff as they were trying to calm him down. Pt at bedside is a poor historian, slightly somnolent, denying any of these events happening. Per behavior health note, family believes staff have been changing his psyhiatric medications around to calm him down and pt has been more sedated then usual. Pt has had diagnosis of bipolar as well as schizophrenia given to him at nursing home, and that they think his behavior has worsened over the past admission to the home. Now also having urinary incontinence as baseline. Pt denies fever, chills, chest pain, shortness of breath, back pain, abdominal pain. Pt AOx1 to name only. cannot remember the events from today. In paper chart pt has had multiple changes in dosages of seroquel and intermittently starting/stopping lithium, valproic acid, and klonopin/xanax. In the ED (2020 21:09)    pt reports having had incontinence for a long time, mainly not making it to the bathroom in time.        *****PAST MEDICAL / Surgical  HISTORY:  PAST MEDICAL & SURGICAL HISTORY:  Schizophrenia  Edema  Depression  Hepatitis  Hyperlipidemia  Seizure disorder  HTN (hypertension)  Bipolar 1 disorder  Diabetes  S/P neck surgery, follow-up exam           *****FAMILY HISTORY:  FAMILY HISTORY:  No pertinent family history in first degree relatives           *****SOCIAL HISTORY:  Alcohol: None  Smoking: None         *****ALLERGIES:   Allergies    Cipro (Unknown)  penicillins (Unknown)    Intolerances             *****MEDICATIONS: current medication reviewed and documented.   MEDICATIONS  (STANDING):  aspirin  chewable 81 milliGRAM(s) Oral daily  atorvastatin 80 milliGRAM(s) Oral at bedtime  benztropine 1 milliGRAM(s) Oral two times a day  clonazePAM  Tablet 1 milliGRAM(s) Oral every 12 hours  dextrose 5%. 1000 milliLiter(s) (50 mL/Hr) IV Continuous <Continuous>  dextrose 50% Injectable 12.5 Gram(s) IV Push once  dextrose 50% Injectable 25 Gram(s) IV Push once  dextrose 50% Injectable 25 Gram(s) IV Push once  heparin  Injectable 5000 Unit(s) SubCutaneous every 12 hours  insulin glargine Injectable (LANTUS) 15 Unit(s) SubCutaneous at bedtime  insulin lispro (HumaLOG) corrective regimen sliding scale   SubCutaneous three times a day before meals  insulin lispro (HumaLOG) corrective regimen sliding scale   SubCutaneous at bedtime  levothyroxine 25 MICROGram(s) Oral daily  metoprolol succinate  milliGRAM(s) Oral daily    MEDICATIONS  (PRN):  dextrose 40% Gel 15 Gram(s) Oral once PRN Blood Glucose LESS THAN 70 milliGRAM(s)/deciliter  glucagon  Injectable 1 milliGRAM(s) IntraMuscular once PRN Glucose LESS THAN 70 milligrams/deciliter  OLANZapine Injectable 1.25 milliGRAM(s) IntraMuscular every 6 hours PRN agitation           *****REVIEW OF SYSTEM:  GEN: no fever, no chills, no pain  RESP: no SOB, no cough, no sputum  CVS: no chest pain, no palpitations, no edema  GI: no abdominal pain, no nausea, no vomiting, no constipation, no diarrhea  : no dysurea, no frequency, no hematurea  Neuro: no headache, no dizziness  PSYCH: no anxiety, no depression  Derm : no itching, no rash         *****VITAL SIGNS:  T(C): 36.3 (20 @ 12:12), Max: 36.6 (20 @ 05:10)  HR: 96 (20 @ 12:12) (84 - 96)  BP: 140/73 (20 @ 12:12) (107/77 - 140/73)  RR: 16 (20 @ 12:12) (16 - 18)  SpO2: 98% (20 @ 12:12) (98% - 100%)  Wt(kg): --           *****PHYSICAL EXAM: noted to be having recurrent tremor diffusely, with some tardive mvt.   Alert oriented x2.5  Attention comprehension are fair. somewhat limited exam   Able to follow 1-2  step commands. limited insight      EOMI fundi not visualized,  blinks to threat   No facial asymmetry   Tongue is midline   Palate elevates symmetrically   Moving all 4 ext symmetrically antigravity   does not cooperate with resistance testing   slightly increased tone throughout       Gait : not assessed.                 *****LAB AND IMAGIN.4   7.23  )-----------( 172      ( 2020 04:24 )             37.5                   136  |  101  |  13  ----------------------------<  94  3.5   |  22  |  0.88    Ca    8.1<L>      2020 04:24  Mg     2.2         TPro  8.0  /  Alb  3.6  /  TBili  0.5  /  DBili  < 0.2  /  AST  56<H>  /  ALT  65<H>  /  AlkPhos  102                              Urinalysis Basic - ( 2020 19:30 )    Color: YELLOW / Appearance: CLEAR / S.018 / pH: 6.0  Gluc: NEGATIVE / Ketone: NEGATIVE  / Bili: NEGATIVE / Urobili: NORMAL   Blood: NEGATIVE / Protein: 10 / Nitrite: NEGATIVE   Leuk Esterase: NEGATIVE / RBC: x / WBC x   Sq Epi: x / Non Sq Epi: x / Bacteria: x        [All pertinent recent Imaging reports reviewed]         *****A S S E S S M E N T   A N D   P L A N :        Problem/Recommendations 1:      Problem/Recommendations 2:       ___________________________  Will follow with you.  Thank you,  Brenda Vazquez MD  Diplomate of the American Board of Neurology and Psychiatry.  Diplomate of the American Board of Vascular Neurology.   [facility] ([facility]), [facility]   Ph: [phone]    Differential diagnosis and plan of care discussed with patient after the evaluation.   Advanced care planning options discussed.   Pain assessed and judicious use of narcotics when appropriate was discussed.  Importance of Fall prevention discussed.  Counseling on Smoking and Alcohol cessation was offered when appropriate.  Counseling on Diet, exercise, and medication compliance was done.   83 minutes spent on the total encounter;  more than 50 % of the visit was spent on counseling  and or coordinating care by the attending physician.    Thank you for allowing me to participate in the care of this mary patient. Please do not hesitate to call me if you have any questions.     This and subsequent notes were partially created using voice recognition software and will  inherently be subject to errors including those of syntax and sound alike substitutions which may escape proofreading. In such instances original meaning may be extrapolated by contextual derivation. Patient is a 69y old  Male who presents with a chief complaint of agitation/AMS (2020 22:33)    Excerpt from H&P,  70 y/o male with ?hepatitis, HTN, hld, DM, bipolar d/o, ?dementia, ?schizophrenia who was sent from nursing home for psychiatric evaluation and medication adjustment due to extreme agitation. Pt was found today ambulating without assistance with his pants at his ankle and covered in feces. Became combative with staff as they were trying to calm him down. Pt at bedside is a poor historian, slightly somnolent, denying any of these events happening. Per behavior health note, family believes staff have been changing his psyhiatric medications around to calm him down and pt has been more sedated then usual. Pt has had diagnosis of bipolar as well as schizophrenia given to him at nursing home, and that they think his behavior has worsened over the past admission to the home. Now also having urinary incontinence as baseline. Pt denies fever, chills, chest pain, shortness of breath, back pain, abdominal pain. Pt AOx1 to name only. cannot remember the events from today. In paper chart pt has had multiple changes in dosages of seroquel and intermittently starting/stopping lithium, valproic acid, and klonopin/xanax. In the ED (2020 21:09)    pt reports having had incontinence for a long time, mainly not making it to the bathroom in time.        *****PAST MEDICAL / Surgical  HISTORY:  PAST MEDICAL & SURGICAL HISTORY:  Schizophrenia  Edema  Depression  Hepatitis  Hyperlipidemia  Seizure disorder  HTN (hypertension)  Bipolar 1 disorder  Diabetes  S/P neck surgery, follow-up exam           *****FAMILY HISTORY:  FAMILY HISTORY:  No pertinent family history in first degree relatives           *****SOCIAL HISTORY:  Alcohol: None  Smoking: None         *****ALLERGIES:   Allergies    Cipro (Unknown)  penicillins (Unknown)    Intolerances             *****MEDICATIONS: current medication reviewed and documented.   MEDICATIONS  (STANDING):  aspirin  chewable 81 milliGRAM(s) Oral daily  atorvastatin 80 milliGRAM(s) Oral at bedtime  benztropine 1 milliGRAM(s) Oral two times a day  clonazePAM  Tablet 1 milliGRAM(s) Oral every 12 hours  dextrose 5%. 1000 milliLiter(s) (50 mL/Hr) IV Continuous <Continuous>  dextrose 50% Injectable 12.5 Gram(s) IV Push once  dextrose 50% Injectable 25 Gram(s) IV Push once  dextrose 50% Injectable 25 Gram(s) IV Push once  heparin  Injectable 5000 Unit(s) SubCutaneous every 12 hours  insulin glargine Injectable (LANTUS) 15 Unit(s) SubCutaneous at bedtime  insulin lispro (HumaLOG) corrective regimen sliding scale   SubCutaneous three times a day before meals  insulin lispro (HumaLOG) corrective regimen sliding scale   SubCutaneous at bedtime  levothyroxine 25 MICROGram(s) Oral daily  metoprolol succinate  milliGRAM(s) Oral daily    MEDICATIONS  (PRN):  dextrose 40% Gel 15 Gram(s) Oral once PRN Blood Glucose LESS THAN 70 milliGRAM(s)/deciliter  glucagon  Injectable 1 milliGRAM(s) IntraMuscular once PRN Glucose LESS THAN 70 milligrams/deciliter  OLANZapine Injectable 1.25 milliGRAM(s) IntraMuscular every 6 hours PRN agitation           *****REVIEW OF SYSTEM:  GEN: no fever, no chills, no pain  RESP: no SOB, no cough, no sputum  CVS: no chest pain, no palpitations, no edema  GI: no abdominal pain, no nausea, no vomiting, no constipation, no diarrhea  : no dysurea, no frequency, no hematurea  Neuro: no headache, no dizziness  PSYCH: no anxiety, no depression  Derm : no itching, no rash         *****VITAL SIGNS:  T(C): 36.3 (20 @ 12:12), Max: 36.6 (20 @ 05:10)  HR: 96 (20 @ 12:12) (84 - 96)  BP: 140/73 (20 @ 12:12) (107/77 - 140/73)  RR: 16 (20 @ 12:12) (16 - 18)  SpO2: 98% (20 @ 12:12) (98% - 100%)  Wt(kg): --           *****PHYSICAL EXAM: noted to be having recurrent tremor diffusely, with some tardive mvt.   Alert oriented x2.5  Attention comprehension are fair. somewhat limited exam   Able to follow 1-2  step commands. limited insight      EOMI fundi not visualized,  blinks to threat   No facial asymmetry   Tongue is midline   Palate elevates symmetrically   Moving all 4 ext symmetrically antigravity   does not cooperate with resistance testing   slightly increased tone throughout       Gait : not assessed.                 *****LAB AND IMAGIN.4   7.23  )-----------( 172      ( 2020 04:24 )             37.5                   136  |  101  |  13  ----------------------------<  94  3.5   |  22  |  0.88    Ca    8.1<L>      2020 04:24  Mg     2.2         TPro  8.0  /  Alb  3.6  /  TBili  0.5  /  DBili  < 0.2  /  AST  56<H>  /  ALT  65<H>  /  AlkPhos  102                              Urinalysis Basic - ( 2020 19:30 )    Color: YELLOW / Appearance: CLEAR / S.018 / pH: 6.0  Gluc: NEGATIVE / Ketone: NEGATIVE  / Bili: NEGATIVE / Urobili: NORMAL   Blood: NEGATIVE / Protein: 10 / Nitrite: NEGATIVE   Leuk Esterase: NEGATIVE / RBC: x / WBC x   Sq Epi: x / Non Sq Epi: x / Bacteria: xVitamin B12, Serum (20 @ 05:45)    Vitamin B12, Serum: 424 pg/mL         [All pertinent recent Imaging reports reviewed]         *****A S S E S S M E N T   A N D   P L A N :        70 y/o male with ?hepatitis, HTN, hld, DM, bipolar d/o, ?dementia, ?schizophrenia who was sent from nursing home for psychiatric evaluation and medication adjustment due to extreme agitation. Pt was found today ambulating without assistance with his pants at his ankle and covered in feces. Became combative with staff as they were trying to calm him down. Pt at bedside is a poor historian, slightly somnolent, denying any of these events happening. Per behavior health note, family believes staff have been changing his psyhiatric medications around to calm him down and pt has been more sedated then usual. Pt has had diagnosis of bipolar as well as schizophrenia given to him at nursing home, and that they think his behavior has worsened over the past admission to the home. Now also having urinary incontinence as baseline. Pt denies fever, chills, chest pain, shortness of breath, back pain, abdominal pain. Pt AOx1 to name only. cannot remember the events from today. In paper chart pt has had multiple changes in dosages of seroquel and intermittently starting/stopping lithium, valproic acid, and klonopin/xanax. In the ED (2020 21:09)    pt reports having had incontinence for a long time, mainly not making it to the bathroom in time.     Problem/Recommendations 1:  agitation unclear if this is related to acute cessation of meds vs. xanax/klonopin withdrawal vs. other acute medical illness   infectious workup is unremarkable.   pt with long standing psychiatric history with EPS noted on evaluation   pt likely needs close monitoring for med compliance to avoid recurrent admissions.   low suspicion that this is NPH (ct without enlarged ventricles, and transependymal flow) given that he lacks normal stigmata, however if the suspicion is high can consider nonemergent workup outpt.   stress incontinence can have myriad of other reasons.   utox is + for barbiturates unclear source ? crossreactivity with his current psych meds, defer to psych to clarify this              ___________________________  Will follow with you.  Thank you,  Brenda Vazquez MD  Diplomate of the American Board of Neurology and Psychiatry.  Diplomate of the American Board of Vascular Neurology.   [facility] ([facility]), [facility]   Ph: [phone]    Differential diagnosis and plan of care discussed with patient after the evaluation.   Advanced care planning options discussed.   Pain assessed and judicious use of narcotics when appropriate was discussed.  Importance of Fall prevention discussed.  Counseling on Smoking and Alcohol cessation was offered when appropriate.  Counseling on Diet, exercise, and medication compliance was done.   83 minutes spent on the total encounter;  more than 50 % of the visit was spent on counseling  and or coordinating care by the attending physician.    Thank you for allowing me to participate in the care of this mary patient. Please do not hesitate to call me if you have any questions.     This and subsequent notes were partially created using voice recognition software and will  inherently be subject to errors including those of syntax and sound alike substitutions which may escape proofreading. In such instances original meaning may be extrapolated by contextual derivation.

## 2020-02-27 NOTE — PROGRESS NOTE BEHAVIORAL HEALTH - RISK ASSESSMENT
Risk factors: underlying diagnosis of bipolar disorder vs. schizoaffective disorder. History of past inpatient psychiatric hospitalizations and history of aggression/agitation. History of opiate use disorder.     Protective Factors: No documented past SAs and no current reported SI/HI.     At this time, patient's risk for dangerous behavior is elevated by disorganized/agitated behavior and confusion with recent falls and incontinence. Risk factors: underlying diagnosis of bipolar disorder vs. schizoaffective disorder. History of past inpatient psychiatric hospitalizations and history of aggression/agitation. History of opiate use disorder.     Protective Factors: No documented past SAs and no current reported SI/HI.     At this time, patient's risk for dangerous behavior is elevated by disorganized/agitated behavior and confusion with recent falls and incontinence.    Patient today more alert, did not get PRNs, calm, not agitated, at this time not at acute risk of harm to self for others. However can get agitated in the context of confusion.

## 2020-02-27 NOTE — PROGRESS NOTE BEHAVIORAL HEALTH - CASE SUMMARY
Patient seen and evaluated, agree with above assessment and plan, pt. more alert, more verbal, engaging well, not agitated, calm, AAOX2, knows he is at the hospital but thinks he is at [facility].  Patient  does not remember that he is presenting from Nursing facility in the context of agitation. 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 Patient denies SI and HI, denies AH and VH, reported feeling safe in the hospital. Recommend meds. as written above, monitor EKG for qtc, will follow.
Patient seen and evaluated , agree with above assessment and plan, pt. sedated on exam and unable to engage in interview. Recommend meds. as written above, monitor EKG for qtc, collateral needed from outpt. psychiatrist, will follow

## 2020-02-27 NOTE — PROGRESS NOTE BEHAVIORAL HEALTH - SUMMARY
Pt. is a 69 Y.O.  male domiciled at skilled nursing facility and on disability. PPHx of dementia and various unclear dx of bipolar disorder, schizoaffective disorder and ALISHA. Hx of admission to [facility] hospitalization approximately 7 years ago and recently admitted to [facility] Floor 2/13-2/21 2020 for medication changes. No documented or reported Hx of psychiatric diagnoses or treatment prior to [facility] hospitalization. No reported/documented Hx of SA.  + history of aggression in NH. Hx of opiate (heroin/pills) dependence with 30 yrs of MAT with methadone. One reported admission to drug rehab prior to state hospitalization. 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 Pt. BIB by EMS from nursing home for increased agitation and physical aggression towards staff with the goal of medication adjustments for behavioral disturbances.     Patient presented to the ER in the context of increased agitation and aggression toward staff after medication adjustments. Patient per collateral has also been falling, falling out of bed, incontinent and had a decline in his ability to ambulate and care for self.     Upon exam today, patient is awake, AAOx2, not agitated, and converses appropriately. He is somewhat irritable. He is noted to have tremors and muscle rigidity on exam, likely EPS from Haldol. He denies SI/HI and denies AVH/delusions.     Plan:   -Continue Klonopin 1mg BID (HOLD for sedation)  -Continue Cogentin 1mg BID  -Patient received Haldol Dec 100mg ECKERT on (as per med. rec in the chart)  -Non-compliant with Lithium and Depakote as per labs. Depakote has already been discontinued as per pharmacist med rec. Continue to hold Lithium as well.    -PRN Zyprexa 2.5mg po/IM q6H for agitation with an additional 2.5mg po/IM for refractory agitation.   -Patient with history of agitation/confusion, falls, and urinary incontinence, but normal Head CT. Concern for NPH; possibly requiring neuro consult; may need Brain MRI? Pt. is a 69 Y.O.  male domiciled at skilled nursing facility and on disability. PPHx of dementia and various unclear dx of bipolar disorder, schizoaffective disorder and ALISHA. Hx of admission to [facility] hospitalization approximately 7 years ago and recently admitted to [facility] Floor 2/13-2/21 2020 for medication changes. No documented or reported Hx of psychiatric diagnoses or treatment prior to [facility] hospitalization. No reported/documented Hx of SA.  + history of aggression in NH. Hx of opiate (heroin/pills) dependence with 30 yrs of MAT with methadone. One reported admission to drug rehab prior to state hospitalization. 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 Pt. BIB by EMS from nursing home for increased agitation and physical aggression towards staff with the goal of medication adjustments for behavioral disturbances.     Patient presented to the ER in the context of increased agitation and aggression toward staff after medication adjustments. Patient per collateral has also been falling, falling out of bed, incontinent and had a decline in his ability to ambulate and care for self.     Upon exam today, patient is awake, AAOx2, not agitated, and converses appropriately. He does not remember that he presented for agitation, He is somewhat irritable. He is noted to have tremors and Cogwheeling rigidity on exam, likely EPS from Haldol. He denies SI/HI and denies AVH/delusions.     Plan:   -Continue Klonopin 1mg BID (HOLD for sedation)  -Continue Cogentin 1mg BID  -Patient recently received Haldol Dec 100mg ECKERT (as per med. rec in the chart)  -Non-compliant with Lithium and Depakote as per labs. Depakote has already been discontinued as per pharmacist med rec. Continue to hold Lithium as well.    -PRN Zyprexa 2.5mg po/IM q6H for agitation with an additional 2.5mg po/IM for refractory agitation.   -Patient with history of agitation/confusion, falls, and urinary incontinence, but normal Head CT. Concern for NPH; possibly requiring neuro consult; may need Brain MRI? Pt. is a 69 Y.O.  male domiciled at skilled nursing facility and on disability. PPHx of dementia and various unclear dx of bipolar disorder, schizoaffective disorder and ALISHA. Hx of admission to [facility] hospitalization approximately 7 years ago and recently admitted to [facility] Floor 2/13-2/21 2020 for medication changes. No documented or reported Hx of psychiatric diagnoses or treatment prior to [facility] hospitalization. No reported/documented Hx of SA.  + history of aggression in NH. Hx of opiate (heroin/pills) dependence with 30 yrs of MAT with methadone. One reported admission to drug rehab prior to state hospitalization. 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 Pt. BIB by EMS from nursing home for increased agitation and physical aggression towards staff with the goal of medication adjustments for behavioral disturbances.     Patient presented to the ER in the context of increased agitation and aggression toward staff after medication adjustments. Patient per collateral has also been falling, falling out of bed, incontinent and had a decline in his ability to ambulate and care for self.     Upon exam today, patient is awake, AAOx2, not agitated, and converses appropriately. He does not remember that he presented for agitation, He is somewhat irritable. He is noted to have tremors and Cogwheeling rigidity on exam, likely EPS from Haldol. He denies SI/HI and denies AVH/delusions.     Plan:   -Continue Klonopin 1mg BID (HOLD for sedation)  -Continue Cogentin 1mg BID  -Patient recently received Haldol Dec 100mg ECKERT (as per med. rec in the chart)  -Non-compliant with Lithium and Depakote as per labs. Depakote has already been discontinued as per pharmacist med rec. Continue to hold Lithium as well.    -PRN Zyprexa 2.5mg po/IM q6H for agitation with an additional 2.5mg po/IM for refractory agitation.   -Patient with history of agitation/confusion, falls, and urinary incontinence, but normal Head CT. Concern for NPH; possibly requiring neuro consult; may need Brain MRI?  -Will attempt to gain collateral from patient's psychiatrist Dr. Franco ([phone]) Pt. is a 69 Y.O.  male domiciled at skilled nursing facility and on disability. PPHx of dementia and various unclear dx of bipolar disorder, schizoaffective disorder and ALISHA. Hx of admission to [facility] hospitalization approximately 7 years ago and recently admitted to [facility] Floor 2/13-2/21 2020 for medication changes. No documented or reported Hx of psychiatric diagnoses or treatment prior to [facility] hospitalization. No reported/documented Hx of SA.  + history of aggression in NH. Hx of opiate (heroin/pills) dependence with 30 yrs of MAT with methadone. One reported admission to drug rehab prior to state hospitalization. 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 Pt. BIB by EMS from nursing home for increased agitation and physical aggression towards staff with the goal of medication adjustments for behavioral disturbances.     Patient presented to the ER in the context of increased agitation and aggression toward staff after medication adjustments. Patient per collateral has also been falling, falling out of bed, incontinent and had a decline in his ability to ambulate and care for self.     Upon exam today, patient is awake, AAOx2, not agitated, and converses appropriately. He does not remember that he presented for agitation, He is somewhat irritable. He is noted to have tremors and Cogwheeling rigidity on exam, likely EPS from Haldol. He denies SI/HI and denies AVH/delusions.     Plan:   -Continue Klonopin 1mg BID (HOLD for sedation)  -Continue Cogentin 1mg BID  -Patient recently received Haldol Dec 100mg ECKERT (as per med. rec in the chart)  -Non-compliant with Lithium and Depakote as per labs. Depakote has already been discontinued as per pharmacist med rec. Continue to hold Lithium as well.    -PRN Zyprexa 2.5mg po/IM q6H for agitation with an additional 2.5mg po/IM for refractory agitation.   -Patient with history of agitation/confusion, falls, and urinary incontinence, but normal Head CT. Concern for NPH; possibly requiring neuro consult; may need Brain MRI?  -Check TSH, B12 and Folate  -Will attempt to gain collateral from patient's psychiatrist Dr. Franco ([phone])

## 2020-02-27 NOTE — PROGRESS NOTE ADULT - SUBJECTIVE AND OBJECTIVE BOX
MANUELITOMIKALCIARA  69y  Male      Patient is a 69y old  Male who presents with a chief complaint of agitation/AMS (27 Feb 2020 12:53)  feels ok.no sob,no cp,no fever,no cough,responsive to quesstions.not inany distress.    REVIEW OF SYSTEMS:  as above        INTERVAL HPI/OVERNIGHT EVENTS:  T(C): 36.3 (02-27-20 @ 12:12), Max: 36.6 (02-27-20 @ 05:10)  HR: 96 (02-27-20 @ 12:12) (91 - 96)  BP: 140/73 (02-27-20 @ 12:12) (132/87 - 140/73)  RR: 16 (02-27-20 @ 12:12) (16 - 18)  SpO2: 98% (02-27-20 @ 12:12) (98% - 100%)  Wt(kg): --  I&O's Summary    T(C): 36.3 (02-27-20 @ 12:12), Max: 36.6 (02-27-20 @ 05:10)  HR: 96 (02-27-20 @ 12:12) (91 - 96)  BP: 140/73 (02-27-20 @ 12:12) (132/87 - 140/73)  RR: 16 (02-27-20 @ 12:12) (16 - 18)  SpO2: 98% (02-27-20 @ 12:12) (98% - 100%)  Wt(kg): --Vital Signs Last 24 Hrs  T(C): 36.3 (27 Feb 2020 12:12), Max: 36.6 (27 Feb 2020 05:10)  T(F): 97.3 (27 Feb 2020 12:12), Max: 97.9 (27 Feb 2020 05:10)  HR: 96 (27 Feb 2020 12:12) (91 - 96)  BP: 140/73 (27 Feb 2020 12:12) (132/87 - 140/73)  BP(mean): --  RR: 16 (27 Feb 2020 12:12) (16 - 18)  SpO2: 98% (27 Feb 2020 12:12) (98% - 100%)    LABS:                        12.4   7.23  )-----------( 172      ( 27 Feb 2020 04:24 )             37.5     02-27    136  |  101  |  13  ----------------------------<  94  3.5   |  22  |  0.88    Ca    8.1<L>      27 Feb 2020 04:24  Mg     2.2     02-26    TPro  8.0  /  Alb  3.6  /  TBili  0.5  /  DBili  < 0.2  /  AST  56<H>  /  ALT  65<H>  /  AlkPhos  102  02-26        CAPILLARY BLOOD GLUCOSE      POCT Blood Glucose.: 131 mg/dL (27 Feb 2020 16:42)  POCT Blood Glucose.: 105 mg/dL (27 Feb 2020 11:50)  POCT Blood Glucose.: 108 mg/dL (27 Feb 2020 07:31)  POCT Blood Glucose.: 136 mg/dL (26 Feb 2020 21:49)            PAST MEDICAL & SURGICAL HISTORY:  Schizophrenia  Edema  Depression  Hepatitis  Hyperlipidemia  Seizure disorder  HTN (hypertension)  Bipolar 1 disorder  Diabetes  S/P neck surgery, follow-up exam      MEDICATIONS  (STANDING):  aspirin  chewable 81 milliGRAM(s) Oral daily  atorvastatin 80 milliGRAM(s) Oral at bedtime  benztropine 1 milliGRAM(s) Oral two times a day  clonazePAM  Tablet 1 milliGRAM(s) Oral every 12 hours  dextrose 5%. 1000 milliLiter(s) (50 mL/Hr) IV Continuous <Continuous>  dextrose 50% Injectable 12.5 Gram(s) IV Push once  dextrose 50% Injectable 25 Gram(s) IV Push once  dextrose 50% Injectable 25 Gram(s) IV Push once  heparin  Injectable 5000 Unit(s) SubCutaneous every 12 hours  insulin glargine Injectable (LANTUS) 15 Unit(s) SubCutaneous at bedtime  insulin lispro (HumaLOG) corrective regimen sliding scale   SubCutaneous three times a day before meals  insulin lispro (HumaLOG) corrective regimen sliding scale   SubCutaneous at bedtime  levothyroxine 25 MICROGram(s) Oral daily  metoprolol succinate  milliGRAM(s) Oral daily    MEDICATIONS  (PRN):  dextrose 40% Gel 15 Gram(s) Oral once PRN Blood Glucose LESS THAN 70 milliGRAM(s)/deciliter  glucagon  Injectable 1 milliGRAM(s) IntraMuscular once PRN Glucose LESS THAN 70 milligrams/deciliter  OLANZapine Injectable 1.25 milliGRAM(s) IntraMuscular every 6 hours PRN agitation        RADIOLOGY & ADDITIONAL TESTS:    Imaging Personally Reviewed:  [ ] YES  [ ] NO    Consultant(s) Notes Reviewed:  [x ] YES  [ ] NO    PHYSICAL EXAM:  GENERAL: Alert and awake lying in bed in no distress  HEAD:  Atraumatic, Normocephalic  EYES: EOMI, CLOTILDE, conjunctiva and sclera clear  NECK: Supple, No JVD, Normal thyroid  NERVOUS SYSTEM:  Alert & Oriented X2, Motor and sensory systems are intact,   CHEST/LUNG: Bilateral clear breath sounds, no rhochi, no wheezing, no crepitations,  HEART: Regular rate and rhythm; No murmurs, rubs, or gallops  ABDOMEN: Soft, Nontender, Nondistended; Bowel sounds present  EXTREMITIES:   Peripheral Pulses are palpable, no  edema        Care Discussed with Consultants/Other Providers [ ] YES  [ ] NO      Code Status: [] Full Code [] DNR [] DNI [] Goals of Care:   Disposition: [] ICU [] Stroke Unit [] RCU []PCU []Floor [] Discharge Home         DAWN JimenezP

## 2020-02-27 NOTE — PROGRESS NOTE ADULT - ASSESSMENT
68 y/o male with ?hepatitis, HTN, hld, DM, bipolar d/o, ?dementia, ?schizophrenia who was sent from nursing home for psychiatric evaluation and medication adjustment due to extreme agitation. To be admitted for further workup

## 2020-02-28 ENCOUNTER — TRANSCRIPTION ENCOUNTER (OUTPATIENT)
Age: 70
End: 2020-02-28

## 2020-02-28 VITALS
SYSTOLIC BLOOD PRESSURE: 120 MMHG | OXYGEN SATURATION: 98 % | TEMPERATURE: 98 F | RESPIRATION RATE: 16 BRPM | DIASTOLIC BLOOD PRESSURE: 72 MMHG | HEART RATE: 84 BPM

## 2020-02-28 LAB
ALBUMIN SERPL ELPH-MCNC: 3.6 G/DL — SIGNIFICANT CHANGE UP (ref 3.3–5)
ALP SERPL-CCNC: 93 U/L — SIGNIFICANT CHANGE UP (ref 40–120)
ALT FLD-CCNC: 54 U/L — HIGH (ref 4–41)
ANION GAP SERPL CALC-SCNC: 11 MMO/L — SIGNIFICANT CHANGE UP (ref 7–14)
AST SERPL-CCNC: 40 U/L — SIGNIFICANT CHANGE UP (ref 4–40)
BACTERIA STL CULT: SIGNIFICANT CHANGE UP
BILIRUB SERPL-MCNC: 0.5 MG/DL — SIGNIFICANT CHANGE UP (ref 0.2–1.2)
BUN SERPL-MCNC: 10 MG/DL — SIGNIFICANT CHANGE UP (ref 7–23)
CALCIUM SERPL-MCNC: 9.1 MG/DL — SIGNIFICANT CHANGE UP (ref 8.4–10.5)
CHLORIDE SERPL-SCNC: 103 MMOL/L — SIGNIFICANT CHANGE UP (ref 98–107)
CO2 SERPL-SCNC: 25 MMOL/L — SIGNIFICANT CHANGE UP (ref 22–31)
CREAT SERPL-MCNC: 0.86 MG/DL — SIGNIFICANT CHANGE UP (ref 0.5–1.3)
GLUCOSE BLDC GLUCOMTR-MCNC: 111 MG/DL — HIGH (ref 70–99)
GLUCOSE BLDC GLUCOMTR-MCNC: 114 MG/DL — HIGH (ref 70–99)
GLUCOSE SERPL-MCNC: 102 MG/DL — HIGH (ref 70–99)
HCT VFR BLD CALC: 38.3 % — LOW (ref 39–50)
HGB BLD-MCNC: 12.3 G/DL — LOW (ref 13–17)
MCHC RBC-ENTMCNC: 28.6 PG — SIGNIFICANT CHANGE UP (ref 27–34)
MCHC RBC-ENTMCNC: 32.1 % — SIGNIFICANT CHANGE UP (ref 32–36)
MCV RBC AUTO: 89.1 FL — SIGNIFICANT CHANGE UP (ref 80–100)
NRBC # FLD: 0 K/UL — SIGNIFICANT CHANGE UP (ref 0–0)
O+P SPEC CONC: SIGNIFICANT CHANGE UP
PLATELET # BLD AUTO: 167 K/UL — SIGNIFICANT CHANGE UP (ref 150–400)
PMV BLD: 10.3 FL — SIGNIFICANT CHANGE UP (ref 7–13)
POTASSIUM SERPL-MCNC: 3.5 MMOL/L — SIGNIFICANT CHANGE UP (ref 3.5–5.3)
POTASSIUM SERPL-SCNC: 3.5 MMOL/L — SIGNIFICANT CHANGE UP (ref 3.5–5.3)
PROT SERPL-MCNC: 7.4 G/DL — SIGNIFICANT CHANGE UP (ref 6–8.3)
RBC # BLD: 4.3 M/UL — SIGNIFICANT CHANGE UP (ref 4.2–5.8)
RBC # FLD: 14.5 % — SIGNIFICANT CHANGE UP (ref 10.3–14.5)
SODIUM SERPL-SCNC: 139 MMOL/L — SIGNIFICANT CHANGE UP (ref 135–145)
SPECIMEN SOURCE: SIGNIFICANT CHANGE UP
TRI STN SPEC: SIGNIFICANT CHANGE UP
WBC # BLD: 4.73 K/UL — SIGNIFICANT CHANGE UP (ref 3.8–10.5)
WBC # FLD AUTO: 4.73 K/UL — SIGNIFICANT CHANGE UP (ref 3.8–10.5)

## 2020-02-28 PROCEDURE — 74018 RADEX ABDOMEN 1 VIEW: CPT | Mod: 26

## 2020-02-28 PROCEDURE — 99232 SBSQ HOSP IP/OBS MODERATE 35: CPT

## 2020-02-28 PROCEDURE — 99238 HOSP IP/OBS DSCHRG MGMT 30/<: CPT

## 2020-02-28 RX ORDER — CLONAZEPAM 1 MG
2 TABLET ORAL
Qty: 0 | Refills: 0 | DISCHARGE

## 2020-02-28 RX ORDER — ACETAMINOPHEN 500 MG
2 TABLET ORAL
Qty: 0 | Refills: 0 | DISCHARGE

## 2020-02-28 RX ORDER — PRIMIDONE 250 MG/1
1 TABLET ORAL
Qty: 0 | Refills: 0 | DISCHARGE

## 2020-02-28 RX ORDER — INSULIN GLARGINE 100 [IU]/ML
15 INJECTION, SOLUTION SUBCUTANEOUS
Qty: 0 | Refills: 0 | DISCHARGE
Start: 2020-02-28

## 2020-02-28 RX ORDER — INSULIN GLARGINE 100 [IU]/ML
25 INJECTION, SOLUTION SUBCUTANEOUS
Qty: 0 | Refills: 0 | DISCHARGE

## 2020-02-28 RX ORDER — METOPROLOL TARTRATE 50 MG
1 TABLET ORAL
Qty: 0 | Refills: 0 | DISCHARGE

## 2020-02-28 RX ORDER — BENZTROPINE MESYLATE 1 MG
2 TABLET ORAL
Qty: 0 | Refills: 0 | DISCHARGE

## 2020-02-28 RX ORDER — QUETIAPINE FUMARATE 200 MG/1
1 TABLET, FILM COATED ORAL
Qty: 0 | Refills: 0 | DISCHARGE

## 2020-02-28 RX ORDER — HALOPERIDOL DECANOATE 100 MG/ML
100 INJECTION INTRAMUSCULAR
Qty: 0 | Refills: 0 | DISCHARGE

## 2020-02-28 RX ORDER — FAMOTIDINE 10 MG/ML
1 INJECTION INTRAVENOUS
Qty: 0 | Refills: 0 | DISCHARGE

## 2020-02-28 RX ORDER — BENZTROPINE MESYLATE 1 MG
1 TABLET ORAL
Qty: 0 | Refills: 0 | DISCHARGE
Start: 2020-02-28

## 2020-02-28 RX ORDER — CLONAZEPAM 1 MG
1 TABLET ORAL
Qty: 0 | Refills: 0 | DISCHARGE
Start: 2020-02-28

## 2020-02-28 RX ORDER — METOPROLOL TARTRATE 50 MG
1 TABLET ORAL
Qty: 0 | Refills: 0 | DISCHARGE
Start: 2020-02-28

## 2020-02-28 RX ADMIN — Medication 1 MILLIGRAM(S): at 05:00

## 2020-02-28 RX ADMIN — Medication 1 MILLIGRAM(S): at 16:21

## 2020-02-28 RX ADMIN — Medication 1 MILLIGRAM(S): at 05:09

## 2020-02-28 RX ADMIN — Medication 1 MILLIGRAM(S): at 16:22

## 2020-02-28 RX ADMIN — Medication 25 MICROGRAM(S): at 05:00

## 2020-02-28 RX ADMIN — HEPARIN SODIUM 5000 UNIT(S): 5000 INJECTION INTRAVENOUS; SUBCUTANEOUS at 16:22

## 2020-02-28 RX ADMIN — Medication 81 MILLIGRAM(S): at 16:22

## 2020-02-28 RX ADMIN — HEPARIN SODIUM 5000 UNIT(S): 5000 INJECTION INTRAVENOUS; SUBCUTANEOUS at 05:00

## 2020-02-28 RX ADMIN — Medication 3 MILLIGRAM(S): at 00:04

## 2020-02-28 RX ADMIN — Medication 100 MILLIGRAM(S): at 05:00

## 2020-02-28 NOTE — PROGRESS NOTE BEHAVIORAL HEALTH - NSBHCHARTREVIEWLAB_PSY_A_CORE FT
13.1   6.63  )-----------( 223      ( 2020 05:45 )             42.4         143  |  104  |  15  ----------------------------<  113<H>  3.7   |  23  |  0.94    Ca    9.3      2020 05:45  Mg     2.2         TPro  8.0  /  Alb  3.6  /  TBili  0.5  /  DBili  < 0.2  /  AST  56<H>  /  ALT  65<H>  /  AlkPhos  102      Urinalysis Basic - ( 2020 19:30 )    Color: YELLOW / Appearance: CLEAR / S.018 / pH: 6.0  Gluc: NEGATIVE / Ketone: NEGATIVE  / Bili: NEGATIVE / Urobili: NORMAL   Blood: NEGATIVE / Protein: 10 / Nitrite: NEGATIVE   Leuk Esterase: NEGATIVE / RBC: x / WBC x   Sq Epi: x / Non Sq Epi: x / Bacteria: x    Li level: 0.06  Vpa Level: <3.2
12.3   4.73  )-----------( 167      ( 28 Feb 2020 05:43 )             38.3
12.4   7.23  )-----------( 172      ( 2020 04:24 )             37.5         136  |  101  |  13  ----------------------------<  94  3.5   |  22  |  0.88    Ca    8.1<L>      2020 04:24  Mg     2.2         TPro  8.0  /  Alb  3.6  /  TBili  0.5  /  DBili  < 0.2  /  AST  56<H>  /  ALT  65<H>  /  AlkPhos  102  02    Urinalysis Basic - ( 2020 19:30 )    Color: YELLOW / Appearance: CLEAR / S.018 / pH: 6.0  Gluc: NEGATIVE / Ketone: NEGATIVE  / Bili: NEGATIVE / Urobili: NORMAL   Blood: NEGATIVE / Protein: 10 / Nitrite: NEGATIVE   Leuk Esterase: NEGATIVE / RBC: x / WBC x   Sq Epi: x / Non Sq Epi: x / Bacteria: x

## 2020-02-28 NOTE — DISCHARGE NOTE NURSING/CASE MANAGEMENT/SOCIAL WORK - NSDCCRTYPESERV_GEN_ALL_CORE_FT
Subjective   Patient ID: Adeline is a 72 year old female.    Chief Complaint   Patient presents with   • Rash     [id number]     Patient presents with a 3-month chief concern of rash on bilateral lower extremities and upper chest and neck.  She notes that it is itchy and red in nature.  She denies any recent exposure to plants, animals or other objects which may have caused the eruption.  She denies any recent changes in soaps, detergents, perfumes or other skin products.  She notes using Aquaphor, calamine lotion and Goldbond products to help with itching symptoms which is not given much relief.  She is here for evaluation and treatment.  She denies chest pain, shortness of breath or dizziness.  No other concerns noted.        Adeline has a past medical history of Conjunctivitis (2/5/2019).  Adeline has Gout; Allergic rhinitis; Benign essential HTN; Bilateral knee pain; CKD stage 3 due to type 2 diabetes mellitus (CMS/HCC); Diastolic heart failure secondary to hypertension (CMS/HCC); Morbid obesity (CMS/[facility]); Multinodular goiter; Osteoarthritis; Multiple joint pain; Chronic obstructive pulmonary disease, unspecified (CMS/HCC); Adjustment disorder with depressed mood; Prediabetes; Elevated sed rate; Vitamin D deficiency; Iron deficiency anemia; and Conjunctivitis on their problem list.  Adeline has a past surgical history that includes tonsillectomy.  Her family history includes Cancer, Breast in an other family member; Coronary Artery Disease in an other family member; Diabetes in her mother; Myocardial Infarction in an other family member; Stroke in an other family member.  Adeline reports that she has quit smoking. Her smoking use included cigarettes. She has never used smokeless tobacco. She reports that she does not drink alcohol or use drugs.  Adeline has a current medication list which includes the following prescription(s): triamcinolone, prednisone, ranitidine, simvastatin, furosemide, irbesartan,  allopurinol, amlodipine, aspirin, ferrous sulfate dried, guaifenesin, hydrocodone-acetaminophen, incontinence brief large, and albuterol-ipratropium 2.5 mg/0.5 mg.  Adeline   Current Outpatient Medications   Medication Sig Dispense Refill   • triamcinolone (ARISTOCORT) 0.1 % ointment Apply topically 2 times daily. 30 g 3   • predniSONE (DELTASONE) 20 MG tablet Take 3 tabs po x 3 days; then 2 tabs po x 3 days; then 1 tab po x 3 days 18 tablet 0   • ranitidine (ZANTAC) 150 MG tablet Take 1 tablet by mouth 2 times daily. 60 tablet 1   • simvastatin (ZOCOR) 40 MG tablet TAKE 1 TABLET BY MOUTH EVERY NIGHT AT BEDTIME 90 tablet 1   • furosemide (LASIX) 40 MG tablet TAKE 1 TABLET BY MOUTH DAILY 30 tablet 0   • irbesartan (AVAPRO) 300 MG tablet TAKE 1 TABLET BY MOUTH DAILY 90 tablet 0   • allopurinol (ZYLOPRIM) 100 MG tablet TAKE 1 TABLET BY MOUTH DAILY 30 tablet 6   • amLODIPine (NORVASC) 5 MG tablet Take 1 tablet by mouth daily. 30 tablet 11   • aspirin (ECOTRIN) 81 MG EC tablet Take 1 tablet by mouth daily.     • ferrous sulfate dried (SLOW RELEASE IRON) 160 (50 Fe) MG extended-release tablet Take 1 tablet by mouth 2 times daily. 60 tablet 6   • GuaiFENesin (MUCINEX PO)      • HYDROcodone-acetaminophen (NORCO) 5-325 MG per tablet   0   • Incontinence Supply Disposable (INCONTINENCE BRIEF LARGE) Misc Dispense 100  2XL INCONTINENCE BRIEFS; PANTY LINERS; and CHUX PADS     • albuterol-ipratropium 2.5 mg/0.5 mg (DUONEB) 0.5-2.5 (3) MG/3ML nebulizer solution        No current facility-administered medications for this visit.      Adeline is allergic to levofloxacin.    Review of Systems   Constitutional: Negative for activity change, appetite change, chills, diaphoresis, fatigue and fever.   HENT: Negative for congestion, ear discharge, ear pain, nosebleeds, rhinorrhea, sinus pressure, sinus pain, sore throat and voice change.    Eyes: Negative for photophobia, pain, redness and itching.   Respiratory: Negative for apnea,  cough, chest tightness, shortness of breath and wheezing.    Cardiovascular: Negative for chest pain, palpitations and leg swelling.   Gastrointestinal: Negative for abdominal pain, blood in stool, constipation, diarrhea, nausea and vomiting.   Genitourinary: Negative for difficulty urinating, dysuria, flank pain, frequency, menstrual problem, pelvic pain, urgency, vaginal bleeding, vaginal discharge and vaginal pain.   Musculoskeletal: Negative for arthralgias, back pain, gait problem, joint swelling, myalgias and neck pain.   Skin: Positive for rash. Negative for wound.   Neurological: Negative for dizziness, syncope, weakness, light-headedness, numbness and headaches.   Psychiatric/Behavioral: Negative for behavioral problems, confusion and decreased concentration. The patient is not nervous/anxious and is not hyperactive.    All other systems reviewed and are negative.      Objective   Physical Exam   Constitutional: She is oriented to person, place, and time. She appears well-developed and well-nourished.   HENT:   Head: Normocephalic and atraumatic.   Eyes: Pupils are equal, round, and reactive to light. Conjunctivae and EOM are normal.   Neck: Normal range of motion. Neck supple.   Cardiovascular: Normal rate, regular rhythm, normal heart sounds and intact distal pulses. Exam reveals no gallop.   No murmur heard.  Pulmonary/Chest: Effort normal and breath sounds normal. No stridor. No respiratory distress. She has no decreased breath sounds. She has no wheezes. She has no rhonchi. She has no rales. She exhibits no tenderness.   Neurological: She is alert and oriented to person, place, and time. No cranial nerve deficit.   Skin: Skin is warm and dry. Rash noted. There is erythema.        There is multiple areas of erythema and excoriation with slight maculopapular formation in the area depicted on the diagram.  This is consistent with contact dermatitis/eczema.   Nursing note and vitals  reviewed.      Assessment   Problem List Items Addressed This Visit     None      Visit Diagnoses     Rash and nonspecific skin eruption    -  [id number] will start patient on prednisone taper and triamcinolone ointment as stated below; monitor clinically.  Advised patient to continue use with Aquaphor or Vaseline to help with moisturizing area as needed.  Advised patient to use over-the-counter Benadryl to help with itching.  Will also give ranitidine 150 mg twice a day as needed to help with itching control.  Monitor clinically.    Relevant Medications    triamcinolone (ARISTOCORT) 0.1 % ointment    predniSONE (DELTASONE) 20 MG tablet    Other eczema    -stable-we will treat as above in problem #1.    Relevant Medications    triamcinolone (ARISTOCORT) 0.1 % ointment    predniSONE (DELTASONE) 20 MG tablet         [facility]

## 2020-02-28 NOTE — PROGRESS NOTE BEHAVIORAL HEALTH - NSBHCONSULTFOLLOWAFTERCARE_PSY_A_CORE FT
Continue to follow up with psychiatrist Dr. Franco ([phone]) at the facility
Continue to follow up with psychiatrist Dr. Franco ([phone]) at the facility

## 2020-02-28 NOTE — PROGRESS NOTE BEHAVIORAL HEALTH - RISK ASSESSMENT
Risk factors: underlying diagnosis of bipolar disorder vs. schizoaffective disorder. History of past inpatient psychiatric hospitalizations and history of aggression/agitation. History of opiate use disorder.     Protective Factors: No documented past SAs and no current reported SI/HI.     At this time, patient's risk for dangerous behavior is elevated by disorganized/agitated behavior and confusion with recent falls and incontinence.    Patient now more alert, overall calm, not agitated, compliant with meds, denies SI and HI, denies acute psychotic sxs, he is future oriented, at this time  he is not at acute risk of harm to self for others. However can get agitated in the context of confusion. Risk factors: underlying diagnosis of bipolar disorder vs. schizoaffective disorder. History of past inpatient psychiatric hospitalizations and history of aggression/agitation. History of opiate use disorder.     Protective Factors: No documented past SAs and no current reported SI/HI.     At this time, patient's risk for dangerous behavior is elevated by disorganized/agitated behavior and confusion with recent falls and incontinence.    Patient now more alert, overall calm, not agitated, compliant with meds, denies SI and HI, denies acute psychotic sxs, he is future oriented, at this time  he is not at acute risk of harm to self or others. However can get agitated in the context of confusion.

## 2020-02-28 NOTE — PROGRESS NOTE BEHAVIORAL HEALTH - SUMMARY
Pt. is a 69 Y.O.  male domiciled at skilled nursing facility and on disability. PPHx of dementia and various unclear dx of bipolar disorder, schizoaffective disorder and ALISHA. Hx of admission to [facility] hospitalization approximately 7 years ago and recently admitted to [facility] Floor 2/13-2/21 2020 for medication changes. No documented or reported Hx of psychiatric diagnoses or treatment prior to [facility] hospitalization. No reported/documented Hx of SA.  + history of aggression in NH. Hx of opiate (heroin/pills) dependence with 30 yrs of MAT with methadone. One reported admission to drug rehab prior to state hospitalization. 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 Pt. BIB by EMS from nursing home for increased agitation and physical aggression towards staff with the goal of medication adjustments for behavioral disturbances.     Patient presented to the ER in the context of increased agitation and aggression toward staff after medication adjustments. Patient per collateral has also been falling, falling out of bed, incontinent and had a decline in his ability to ambulate and care for self.     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 He is AAOx2 ([facility], January, 2020).  Patient had notable EPS and is seen shaking, with tremors in hands and also in mouth, with involuntary lip movements. On physical exam, cogwheeling of upper extremities is noted. Patient again says that he received Haldol Dec ECKERT one week ago. Patient denies SI/HI and denies AVH, no delusions elicited. Reported feeling safe in the hospital. Gave consent to talk to his brother Mr. Martinez. Thuy    Plan:   -Continue Klonopin 1mg BID (HOLD for sedation)  -Continue Cogentin 1mg BID  -Patient recently received Haldol Dec 100mg ECKERT (as per med. rec in the chart)  -Non-compliant with Lithium and Depakote as per labs. Depakote has already been discontinued as per pharmacist med rec. Continue to hold Lithium as well.    -PRN Zyprexa 2.5mg po/IM q6H for agitation with an additional 2.5mg po/IM for refractory agitation.   -Check TSH, B12 and Folate  -Will attempt to gain collateral from patient's psychiatrist Dr. Franco ([phone])    **** Given polypharmacy and ?compliance and current medical issues (fall, confusion, urinary incontinence), will continue the meds. as written above, called  patient's psychiatrist Dr. Franco ([phone]) to clarify the meds.  unable to reach left message, called Mr. Juan Daley [phone] (brother) , left a message, awaiting call back******

## 2020-02-28 NOTE — DISCHARGE NOTE PROVIDER - NSDCMRMEDTOKEN_GEN_ALL_CORE_FT
aspirin 81 mg oral tablet, chewable: 1 tab(s) orally once a day  atorvastatin 80 mg oral tablet: 1 tab(s) orally once a day  Basaglar KwikPen 100 units/mL subcutaneous solution: 25 unit(s) subcutaneous once a day (at bedtime)  benztropine 1 mg oral tablet: 1 tab(s) orally 2 times a day  clonazePAM 1 mg oral tablet: 1 tab(s) orally every 12 hours  latanoprost 0.005% ophthalmic solution: 1 drop(s) to each eye once a day (in the evening)  levothyroxine 25 mcg (0.025 mg) oral tablet: 1 tab(s) orally once a day  Melatonin 3 mg oral tablet: 2 tab(s) orally once a day (at bedtime)  metFORMIN 1000 mg oral tablet: 1 tab(s) orally 2 times a day  metoprolol succinate 100 mg oral tablet, extended release: 1 tab(s) orally once a day  Mysoline 50 mg oral tablet: 1 tab(s) orally once a day (at bedtime)  Nitrostat 0.4 mg sublingual tablet: 1 tab(s) sublingual every 5 minutes, As Needed chest pain  Trulicity Pen 1.5 mg/0.5 mL subcutaneous solution: 0.5 milliliter(s) subcutaneous once a week aspirin 81 mg oral tablet, chewable: 1 tab(s) orally once a day  atorvastatin 80 mg oral tablet: 1 tab(s) orally once a day  benztropine 1 mg oral tablet: 1 tab(s) orally 2 times a day  clonazePAM 1 mg oral tablet: 1 tab(s) orally every 12 hours  Haldol Decanoate 100 mg/mL intramuscular solution: 100 milligram(s) intramuscular every 4 weeks * Last given 2/21/20  insulin glargine: 15 unit(s) subcutaneous once a day (at bedtime)  latanoprost 0.005% ophthalmic solution: 1 drop(s) to each eye once a day (in the evening)  levothyroxine 25 mcg (0.025 mg) oral tablet: 1 tab(s) orally once a day  Melatonin 3 mg oral tablet: 2 tab(s) orally once a day (at bedtime)  metFORMIN 1000 mg oral tablet: 1 tab(s) orally 2 times a day  metoprolol succinate 100 mg oral tablet, extended release: 1 tab(s) orally once a day  Nitrostat 0.4 mg sublingual tablet: 1 tab(s) sublingual every 5 minutes, As Needed chest pain  Trulicity Pen 1.5 mg/0.5 mL subcutaneous solution: 0.5 milliliter(s) subcutaneous once a week

## 2020-02-28 NOTE — PROGRESS NOTE BEHAVIORAL HEALTH - NSBHFUPINTERVALHXFT_PSY_A_CORE
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 He is AAOx2 ([facility], January, 2020).  Patient had notable EPS and is seen shaking, with tremors in hands and also in mouth, with involuntary lip movements. On physical exam, cogwheeling of upper extremities is noted. Patient again says that he received Haldol Dec ECKERT one week ago. Patient denies SI/HI and denies AVH, no delusions elicited. Reported feeling safe in the hospital. Gave consent to talk to his brother Mr. Martinez. Thuy

## 2020-02-28 NOTE — PROGRESS NOTE BEHAVIORAL HEALTH - NSBHCHARTREVIEWIMAGING_PSY_A_CORE FT
< from: CT Head No Cont (02.25.20 @ 16:43) >  IMPRESSION:    Stable exam.  No mass effect, hemorrhage or evidence of acute intracranial pathology.

## 2020-02-28 NOTE — DISCHARGE NOTE PROVIDER - NSDCCPCAREPLAN_GEN_ALL_CORE_FT
PRINCIPAL DISCHARGE DIAGNOSIS  Diagnosis: AMS (altered mental status)  Assessment and Plan of Treatment: You were admitted to the hospital with confusion and a change in behavior. Your CT scan of the head was unremarkable. Such change was likely from too many medications. Upon discharge please continue your prescribed medications and follow-up with your primary care provider as outpatient for ongoing care. If you or your family members notice a state of confusion, slurred speech, change in behavior, or seizure-like activity you should return to the emergency room.      SECONDARY DISCHARGE DIAGNOSES  Diagnosis: Schizophrenia, unspecified type  Assessment and Plan of Treatment: Continue your medications as directed and follow up with your primary care provider/psychiatrist for further evaluation/management. If you are ever in need of immediate psychiatric assistance you may reach out to the [facility] [phone].    Diagnosis: Dementia  Assessment and Plan of Treatment: Please continue your medications as prescribed and allow help with daily acitivities of living. Maintain a safe environment and make movements in a careful manner to prevent falls. Ensure that you are eating and drinking adequately and maintaining a healthy sleep cycle. If you are in need of assistance with medication adjustment you can follow-up with your outpatient provider or refer to the [facility] Geriatric Psychiatry clinic by calling [phone].    Diagnosis: Seizure disorder  Assessment and Plan of Treatment: Continue your medications as directed and please follow-up as an outpatient with your primary care provider for further care and recommendations.    Diagnosis: Hypertension, unspecified type  Assessment and Plan of Treatment: Continue blood pressure medication regimen as directed. Monitor for any visual changes, headaches or dizziness.  Monitor blood pressure regularly.  Follow up with your primary care provider for further management for high blood pressure.    Diagnosis: Type 2 diabetes mellitus with other specified complication, with long-term current use of insulin  Assessment and Plan of Treatment: Continue your medication regimen and a consistent carbohydrate diet (Meaning eating the same amount of carbohydrates at the same time each day). Monitor for signs/symptoms of low blood glucose, such as, dizziness, altered mental status, or cool/clammy skin. In addition, monitor for signs/symptoms of high blood glucose, such as, feeling hot, dry, fatigued, or with increased thirst/urination. Make regular podiatry appointments in order to have feet checked for wounds and uncontrolled toe nail growth to prevent infections, as well as, appointments with an ophthalmologist to monitor your vision. PRINCIPAL DISCHARGE DIAGNOSIS  Diagnosis: AMS (altered mental status)  Assessment and Plan of Treatment: You were admitted to the hospital with confusion and a change in behavior. Your CT scan of the head was unremarkable. Such change was likely from too many medications. Upon discharge please continue your prescribed medications and follow-up with your primary care provider as outpatient for ongoing care. If you or your family members notice a state of confusion, slurred speech, change in behavior, or seizure-like activity you should return to the emergency room.      SECONDARY DISCHARGE DIAGNOSES  Diagnosis: Schizophrenia, unspecified type  Assessment and Plan of Treatment: Continue your medications as directed and follow up with your primary care provider/psychiatrist for further evaluation/management. Haldol Deconate 100mg every 4 weeks IM given 2/21/20. If you are ever in need of immediate psychiatric assistance you may reach out to the [facility] [phone].    Diagnosis: Dementia  Assessment and Plan of Treatment: Please continue your medications as prescribed and allow help with daily acitivities of living. Maintain a safe environment and make movements in a careful manner to prevent falls. Ensure that you are eating and drinking adequately and maintaining a healthy sleep cycle. If you are in need of assistance with medication adjustment you can follow-up with your outpatient provider or refer to the [facility] Geriatric Psychiatry clinic by calling [phone].    Diagnosis: Seizure disorder  Assessment and Plan of Treatment: ** Follow-up with your primary care team and psychiatrist within 2-3 days upon discharge. You were taken off Depakote at nursing home and should continue regimen recommended by your primary care team.    Diagnosis: Hypertension, unspecified type  Assessment and Plan of Treatment: Continue blood pressure medication regimen as directed. Monitor for any visual changes, headaches or dizziness.  Monitor blood pressure regularly.  Follow up with your primary care provider for further management for high blood pressure.    Diagnosis: Type 2 diabetes mellitus with other specified complication, with long-term current use of insulin  Assessment and Plan of Treatment: Continue your medication regimen and a consistent carbohydrate diet (Meaning eating the same amount of carbohydrates at the same time each day). Monitor for signs/symptoms of low blood glucose, such as, dizziness, altered mental status, or cool/clammy skin. In addition, monitor for signs/symptoms of high blood glucose, such as, feeling hot, dry, fatigued, or with increased thirst/urination. Make regular podiatry appointments in order to have feet checked for wounds and uncontrolled toe nail growth to prevent infections, as well as, appointments with an ophthalmologist to monitor your vision.

## 2020-02-28 NOTE — PROGRESS NOTE BEHAVIORAL HEALTH - OTHER
+EPS, + cogwheeling in UE not evaluated, in bed " I want to go home" concrete, impoverished focused on going home, poverty of content improved

## 2020-02-28 NOTE — DISCHARGE NOTE PROVIDER - HOSPITAL COURSE
69M PMHx hepatitis, HTN, DM, bipolar d/o, likely underlying dementia, unclear h/o schizophrenia p/w agitation         Presented for agitation/encephalopathy; No fever/chills or source of infection; Suspect decline in cognitive function related to schizophrenia/bipolar/dementia; CTH with no findings; As per neurology no concern for NPH; Likely 2/2 to polypharmacy for which psychiatry consulted; B12/Folate/TSH WNL; CDiff negative     Diarrhea - CDiff/Culture negative; Resolved    Schizophrenia/Bipolar disorder family reports paranoia w/ no hallucinations; No longer on lithium; Levels low; Starting home klonopin and cogentin; Zyprexa PRN agitation     Seizure disorder - Unclear history previously on Depakote which was discontinued by NH 2/2 oversedation        Dispo - Nursing Home as cleared by medicine, psychiatry, and neurology

## 2020-02-28 NOTE — DISCHARGE NOTE PROVIDER - NSDCFUADDAPPT_GEN_ALL_CORE_FT
Follow-up with medical staff at nursing home Follow-up with medical staff at nursing home  ** Follow-up with your psychiatrist Dr. Franco ([phone]) within 1 week upon discharge

## 2020-02-28 NOTE — PROGRESS NOTE BEHAVIORAL HEALTH - NSBHCHARTREVIEWVS_PSY_A_CORE FT
Vital Signs Last 24 Hrs  T(C): 36.6 (26 Feb 2020 13:51), Max: 36.7 (25 Feb 2020 19:17)  T(F): 97.8 (26 Feb 2020 13:51), Max: 98 (25 Feb 2020 19:17)  HR: 90 (26 Feb 2020 13:51) (89 - 97)  BP: 144/85 (26 Feb 2020 13:51) (116/74 - 144/85)  BP(mean): --  RR: 18 (26 Feb 2020 13:51) (17 - 18)  SpO2: 98% (26 Feb 2020 13:51) (98% - 100%)
ICU Vital Signs Last 24 Hrs  T(C): 36.4 (28 Feb 2020 04:57), Max: 36.4 (27 Feb 2020 21:00)  T(F): 97.5 (28 Feb 2020 04:57), Max: 97.5 (27 Feb 2020 21:00)  HR: 82 (28 Feb 2020 04:57) (82 - 96)  BP: 115/70 (28 Feb 2020 04:57) (115/70 - 147/92)  BP(mean): --  ABP: --  ABP(mean): --  RR: 20 (28 Feb 2020 04:57) (16 - 20)  SpO2: 96% (28 Feb 2020 04:57) (96% - 99%)
Vital Signs Last 24 Hrs  T(C): 36.6 (27 Feb 2020 05:10), Max: 36.6 (26 Feb 2020 13:51)  T(F): 97.9 (27 Feb 2020 05:10), Max: 97.9 (27 Feb 2020 05:10)  HR: 91 (27 Feb 2020 05:10) (84 - 91)  BP: 132/87 (27 Feb 2020 05:10) (107/77 - 144/85)  BP(mean): --  RR: 18 (27 Feb 2020 05:10) (18 - 18)  SpO2: 100% (27 Feb 2020 05:10) (98% - 100%)

## 2020-02-28 NOTE — DISCHARGE NOTE NURSING/CASE MANAGEMENT/SOCIAL WORK - PATIENT PORTAL LINK FT
You can access the FollowMyHealth Patient Portal offered by [facility] by registering at the following website: [email or web address]. By joining [employer]’s FollowMyHealth portal, you will also be able to view your health information using other applications (apps) compatible with our system.

## 2020-06-02 NOTE — ED PROVIDER NOTE - CARDIOVASCULAR [+], MLM
TRANSITIONAL CARE MANAGEMENT - HOSPITAL DISCHARGE FOLLOW-UP    Contacted Ms. Graves regarding follow-up for jejunal enteritis after hospital discharge on 5/25/2020. Review of the After Visit Summary from the recent hospitalization indicates that the patient needs low residue diet for 2 weeks.  Augmentin 14 day course.    She feels that she is doing fairly well at home.   Overall, the patient is eating fairly well.   Ambulation: staying the same  Fever: is not present  Pain: none  Activities of Daily Living (global): Self-care   Daughter-in-Law from [place] stayed with her for 5-6 days following discharge.  Ms Graves is currently not on speaking terms with her daughter.    Additional patient/family concerns:   She is having diarrhea after eating, 3-5 times per day, describes as watery.  Ms Graves will notify Dr. Gill today; she declined assistance.  She feels stressed due to  who was at [facility], transitioned to [facility], and now is admit to [facility].    Discharge medications were verified with the patient. She is partially compliant with most of the medications prescribed, but is not taking vitamins. She reports that she is experiencing medication side effects. she states that Augmentin causes diarrhea.    Upon discharge, the patient was to receive specialist follow-up with Dr. Gill 6/1/2020. GI PLAN: Augmentin total course 4 weeks. Then, repeat CT scan with oral contrast to be certain that her bowel has healed.  After that schedule endoscopy and colonoscopy. She will call [phone] to schedule repeat CT scan in 4 weeks at [facility] for advanced care.    Advance Directives:  not discussed    Please call patient to schedule an appointment with Michaela Barnes MD.     Halie Nathan RN  Outpatient Care Manager  [facility] [place]    CHEST PAIN

## 2020-11-19 NOTE — PROGRESS NOTE ADULT - ASSESSMENT
68yo M with PMH s/f dementia, DMII,  hyperlipidemia, biploar disorder (type I?), glaucoma who was sent from [facility] for health and rehab due to "hyperglycemia, alert with confusion", found to be in nonketotic hyperosmolar hyperglycemic state (HHS), clinically improving. Patient with better controlled blood sugars and likely will discharge to [facility].    . 68yo M with PMH s/f dementia, DMII,  hyperlipidemia, biploar disorder (type I?), glaucoma who was sent from [facility] for health and rehab due to "hyperglycemia, alert with confusion", found to be in nonketotic hyperosmolar hyperglycemic state (HHS), clinically improving. Patient with better controlled blood sugars and likely be will discharge to [facility].    . 66yo M with PMH s/f dementia, DMII,  hyperlipidemia, biploar disorder (type I?), glaucoma who was sent from [facility] for health and rehab due to "hyperglycemia, alert with confusion", found to be in nonketotic hyperosmolar hyperglycemic state (HHS), clinically improving. Patient with better controlled blood sugars and likely be will discharge to [facility].    Discharge plan discussed with Patient's emergency contact (Sister In law) and agrees with discharge.    . High Dose Vitamin A Pregnancy And Lactation Text: High dose vitamin A therapy is contraindicated during pregnancy and breast feeding.

## 2021-06-16 NOTE — PATIENT PROFILE ADULT. - SURGICAL SITE INCISION
[x] [facility]) [facility] &  Therapy  [address]  [phone]  F: [phone] [] [address]  [facility] [address]   [address]  P: [phone]  F: [phone] [] [address]  [address]  P: [phone]  F: [phone] [] [address]  P: [phone]  F: [phone] [] [address]  [facility]   [address]   [place]: [phone]  F: [phone]      Physical Therapy Daily Treatment Note    Date:  2021  Patient Name:  Ap Allen    :  1948  MRN: [id number]  Physician: Dr. Anderson Biswas MD                               Insurance: Medicare ([address] [place] [address]), [employer] (61 visits per year)  Medical Diagnosis: Lumbar facet arthropathy, hip pain, lumbar radiculopathy, inflammatory spondylopathy lumbar region, lumbago with sciatica right side, myofascial pain, Ilioinguinal neuralgia of right side  Rehab Codes: M 54.5, M 54.41, M 25.551, M 25.552, M 62.81, R 26.2, R 29.3, M 79.1  Onset Date: 3/30/21                 Next 's appt.: 21  Visit# / total visits:  ; Progress note for Medicare patient due at visit #18     Cancels/No Shows: 2/0    Subjective:    Pain:  [x] Yes  [] No Location: LB, R hip  Pain Rating: (0-10 scale)  1/10 in R hip,   L LB  8/10    Pain altered Tx:  [x] No  [] Yes  Action:  Comments:  Reports she addressing AM, LB and hip stretches. Reports pain L LB 8/10. Would like to continue focus on core strengthening. To have epidural LB injection .        Objective:  Modalities:  hypervolt to R hip and Left calf x 8 mins total.  5 mins R hip and 3 mins left calf -  Held   CP to L LB/L hip x 10 mins post exercises in SL per pt request.   Precautions:   Exercises:2021, completed exercises marked with \"x\"  Exercise   LB/ hip Reps/ Time Weight/ Level Comments    NuStep w/ lumbar roll 6 mins L4    x   Treadmill  5 mins 1.2 mph  single UE support, pt deferred 6/11            Standing         Gastro stretch 3x20\"  wedge x   Heel raises   20x      x   Hip flexor stretch 3x30\" ea       Hip abd iso abdom 10x2 2 lb    x   Hip extension 10x2 2 lb  x   Hs curls  15x 1.5 lb     Hip flexion  15x 2lbs     Marches  15x 2 lb      Squats 10x2   w/20\" box , min vc/TC for tech/   Used chair for TC 6/4 x   iso abdom w. Neutral spine against wall  10x5\"               cybex rotary 2x10 18 lbs   x   cybex extension 2x10 40 lbs  x   Leg press  3x10 30 lbs Incr. Sets 6/16 x          Balance        Large retro  Lunge step  5x1  5x1  Single UE support, added   No UE support, added      SLS 2 15'  Added foarm 5/25    Tandem stance 2x10-15\" ea   added foarm 5/25    Static standing nudging, floor 10x   Normal YARIEL, added    NBOS, EC on foam 2x15\"         Lumbar ext  3x15\"  rica hands on  Post. Hips, added     tradiional yariel on foam, nudging 2x15\"   added foam 5/25    NBOS on foam nudging eyes closed  2xx15'  Mild LOB anterior/post. CGA to regain-5/25     Seated       LAQ 15x 2 lb       Hamstring stretch 3x20\"  Stool, standing 5/18 w/ 12\"step     HS curls 15x lime      Lumbar flexion 2x15\"              Supine       LTR 5x10\"       Piriformis stretch 3x20\"       R IT Band stretch 3x20\"      MET to correct right leg shorter 1 x   Shotgun technique 2 x. Corrected LLD. MET self correction x  For correction of LLD, R le shorter. HEP issued     Ck pelvis/LL, LL equal             Bridge 2x10   HEP  x          iso abdom 5x5\"   x   iso abdom w/ march 20x 2 lbs    x   iso abdom w/alt opp.   UE 20x 2 lbs  x    iso abdom alt opp  UE/LEs   20x 2 lb in U/LE   x   iso abdom alt same UE/LE 20x 2 lbs U/LE   x   iso abdom w/ walk out 10x 2 lbs LE   x   Crunch w/ ball 2x10  green     Reverse crunch 2x10  Tap 4' step     x   HS stretch 3x30\"  Neetu, rica   x Supine SLR 10 x ea  2 lbs HEP      x   Supine butterfly 2x10 lime               SL hip abd 1x10 1 lbs A      SL clamshells w. iso abdom. 2x10 blue HEP            plank 1x12\"  2x15\"                  Prone pillow       Prone lying 3 mins  Another option of hip flexor elongation, (pt sits often) added 6/2     Hypervolt -- prone/pillow 8  min   3 min left calf, level 2  5 min right hip, level 1                      Other:            Treatment Charges: Mins Units   [x]  Modalities  CP 10 1   [x]  Ther Exercise  43 3   []  Manual Therapy     []  Ther Activities     []  Aquatics     []  Vasocompression     [x]  Other neuro re-ed       Total Treatment time  43 3       Assessment: [x] Progressing toward goals  Focus on core strengthening as pt requested. Completed exercises as charted. frequent verbal reminders for maintaining  isometric abdominals with mat core exercises. [] No change. [] Other   . [x] Patient would continue to benefit from skilled physical therapy services in order to:  Decreased LB and LE pain and increase core and hip strengthening in order to progress functional activity. STG: (to be met in 9 treatments)  1. ? Pain: Lumbar pain improve to 6/10 at max with standing and walking States she can stand for about 4-5 minutes then the pain elevates until 6-7/10 (when standing to do dishes. 2. Right hip pain improve to 6/10 with standing and walking States right hip pain is 5/10 after standing for 5 minutes to do dishes. 3. Left calf pain improve to 6/10 at all times States the left calf is getting better - states both calves feel equal.  Feels exacerbated after being stretched. No cramps in left calf. 4. ? ROM: Lumbar extension improve to 5 degrees -- Met, 7 degrees lumbar extension with legs braced on bed. 5. Patient to report no radicular symptoms with side bending  - -Discomfort in low back but no radicular sx.   6. ? Strength: patient able to SLS on each leg 6 seconds without LOB 5/21/21: MET, 15 second rica LE  7. ? Function: Patient to report improved ability to stand upright during gait cycle -- Slight improvement- still flexed with slight left lean. 1. Patient to be independent with home exercise program as demonstrated by performance with correct form without cues. -- Met     LTG: (to be met in 18 treatments)  1. Lumbar pain improve to 4/10 at max with activity  2. Right hip pain improve to 2/10 at max with activity  3. Left calf pain improve to 2/10 at max with activity  4. Oswestry score improve to 35% functionally impaired. 5. Patient to report resolution of radicular symptoms down leg. 6. Patient to report improved ease of going up/down steps at home. Patient goals: \"Increase mobility in back area, increase standing and sitting without pain\"     Pt. Education:  [x] Yes  [] No  [x] Reviewed Prior HEP/Ed  Method of Education: [x] Verbal  [x] Demo  [] Written 4.22 [employer] texted to patient   Comprehension of Education:  [x] Verbalizes understanding. [x] Demonstrates understanding. [x] Needs review. core exercises. [x] Demonstrates/verbalizes HEP/Ed previously given. Access Code: [email or web address]: [email or web address]/Date: 04/30/2021Prepared by: Thania Marquis    Standing Heel Raise with Support - 1 x daily - 7 x weekly - 10 reps - 3 sets    Standing Knee Flexion AROM with Chair Support - 1 x daily - 7 x weekly - 10 reps - 3 sets    Standing Hip Flexion with Counter Support - 1 x daily - 7 x weekly - 10 reps - 3 sets    Standing Hip Extension - 1 x daily - 7 x weekly - 10 reps - 3 sets    Standing Marching - 1 x daily - 7 x weekly - 10 reps - 3 sets    Standing Hip Abduction - 1 x daily - 7 x weekly - 10 reps - 3 sets    Standing Hip Extension with Counter Support - 1 x daily - 7 x weekly - 10 reps - 3 sets    Standing Hip Abduction with Counter Support - 1 x daily - 7 x weekly - 10 reps - 3 sets    Standing Heel Raise - 1 x daily - 7 x weekly - 10 reps - 3 sets    Sit to Stand - 1 x daily - 7 x weekly - 10 reps - 3 sets    Seated Long Arc Quad - 1 x daily - 7 x weekly - 10 reps - 3 sets    Supine Lower Trunk Rotation - 1 x daily - 7 x weekly - 10 reps - 3 sets    Supine Active Straight Leg Raise - 1 x daily - 7 x weekly - 10 reps - 3 sets    Supine Hip Adduction Isometric with Ball - 1 x daily - 7 x weekly - 10 reps - 3 sets    Bent Knee Fallouts - 1 x daily - 7 x weekly - 10 reps - 3 sets    Dead Bug - 1 x daily - 7 x weekly - 10 reps - 3 sets    Hooklying Sequential Leg March and Lower - 1 x daily - 7 x weekly - 10 reps - 3 sets    Supine Bridge - 1 x daily - 7 x weekly - 10 reps - 3 sets    Clamshell - 1 x daily - 7 x weekly - 10 reps - 3 sets    Sidelying Hip Abduction - 1 x daily - 7 x weekly - 10 reps - 3 sets    Supine Posterior Pelvic Tilt - 1 x daily - 7 x weekly - 10 reps - 3 sets    Modified Derrick Stretch - 1 x daily - 7 x weekly - 10 reps - 3 sets    Curl Up with Reach - 1 x daily - 7 x weekly - 10 reps - 3 sets    Diagonal Curl Up with Reach - 1 x daily - 7 x weekly - 10 reps - 3 sets       Access Code: [email or web address] URL: [employer]/Date: 04/22/2021Prepared by: Steffany Mohan    Supine Posterior Pelvic Tilt - 1 x daily - 5 x weekly - 3 sets - 10 reps - 10 hold    Supine March with Posterior Pelvic Tilt - 1 x daily - 7 x weekly - 10 reps - 3 sets    Supine Bridge - 1 x daily - 7 x weekly - 10 reps - 3 sets    Active Straight Leg Raise with Quad Set - 1 x daily - 7 x weekly - 10 reps - 3 sets    Clamshell - 1 x daily - 7 x weekly - 10 reps - 3 sets    Sidelying Hip Abduction - 1 x daily - 7 x weekly - 10 reps - 3 sets      Access Code: [email or web address]: [employer]/Date: 04/16/2021Prepared by: Jayesh Greene    Gastroc Stretch on Wall - 1 x daily - 7 x weekly - 1 sets - 3 reps - 30 hold    Standing Hip Abduction - 1 x daily - 5 x weekly - 2 sets - 10 reps    Standing Hip Extension with Counter Support - 1 x daily - 5 x weekly - 2 sets - 10 reps    Supine transverse abdominal bracing 1x daily 3x wk 10x 2   Supine Transversus Abdominis Bracing with march - 1 x daily - 7 x weekly - 2 sets - 10 reps - 5 hold    Supine Lower Trunk Rotation - 1 x daily - 7 x weekly - 1-2 sets - 10 reps - 10 hold    Hooklying Isometric Clamshell - 1 x daily - 5 x weekly - 2 sets - 10 reps    Clamshell with Resistance - 1 x daily - 5 x weekly - 2 sets - 10 reps   Ball abdominal crutches 3x wk 2x10 reps  Access Code: [facility]  URL: [email or web address]  Date: 05/14/2021  Prepared by: Deshawn Yang    Exercises  Standing Hip Flexor Stretch - 1 x daily - 7 x weekly - 1 sets - 3 reps - 30 hold    Plan: [x] Continue current frequency toward long and short term goals. [x] Specific Instructions for subsequent treatments add  Leg press machine as able. .  Check for LLD (previously right leg shorter). Once order signed, may begin ionto for right hip if pain does not begin to subside. Progress cybex and core exercise as able.  .   .      Time In:  9069        Time Out:  [phone]    Electronically signed by:  Jovanni Donald PTA, no

## 2022-01-01 NOTE — DISCHARGE NOTE ADULT - NS AS DC AMI YN
I have reviewed the notes, assessments, and/or procedures performed by Corina Pollard RN, IBCLC, I concur with her/his documentation of Sue Reynoso MD 03/16/22 no

## 2022-02-08 NOTE — BEHAVIORAL HEALTH ASSESSMENT NOTE - GAIT / STATION
----- Message from TAMARA Mckeon sent at 2/7/2022  5:26 PM CST -----  Please call patient to inform:  1. Microalbumin is a little elevated, he is dumping protein in his urine. Likely from HTN.  2. Thyroid is normal.  3. Electrolytes, blood sugar, and kidney function look good.  4. Cholesterol levels are stable.  5. A1c increased 6.0% needs to reduce CHO intake.  6. Blood counts look good.  7. FOBT is positive for blood. He will need colonoscopy dx positive fobt. Please order.   Other

## 2023-02-04 NOTE — H&P ADULT - PROBLEM SELECTOR PROBLEM 4
supervision
Type 2 diabetes mellitus with other specified complication, with long-term current use of insulin

## 2024-09-03 NOTE — ED ADULT NURSE NOTE - DRUG PRE-SCREENING (DAST -1)
Examined pt at bedside, resting comfortably. Denies any current pain symptoms, states his regimen is working well. He wishes to go home soon.       PAIN: ( )Yes   ( x)No    DYSPNEA: ( ) Yes  (x ) No    REVIEW OF SYSTEMS:  Constitutional: No fever, no chills, no headache.  HEENT:  No blurred vision, sore throat, earache, or congestion. No neck pain.  COR:  No chest pain. No palpitations.  Lungs:  No shortness of breath or cough.  GI:  No nausea, no vomiting, no diarrhea, no constipation. +abd discomfort  :  No dysuria, frequency or urgency. No hematuria.  Musculoskeletal:  No joint pain or swelling or edema.  Psychiatric:  No anxiety, no depression.  All other systems reviewed and negative          PHYSICAL EXAM:    Vital Signs Last 24 Hrs  T(C): 37.6 (03 Sep 2024 08:53), Max: 37.8 (02 Sep 2024 21:26)  T(F): 99.6 (03 Sep 2024 08:53), Max: 100 (02 Sep 2024 21:26)  HR: 102 (03 Sep 2024 08:53) (102 - 112)  BP: 119/80 (03 Sep 2024 08:53) (119/80 - 121/75)  BP(mean): --  RR: 18 (03 Sep 2024 08:53) (18 - 18)  SpO2: 95% (03 Sep 2024 08:53) (95% - 98%)    Parameters below as of 03 Sep 2024 08:53  Patient On (Oxygen Delivery Method): room air      Daily     Daily Weight in k.6 (03 Sep 2024 06:35)    PPSV2: 60%  FAST:    General: jaundiced, awake, alert, NAD   Lungs: clear to ausculation b/l   Cardiac: regular rate and rhythm   GI: distended, no tenderness to palpation, bs present   Ext: well perfused, no edema     LABS:                        8.9    8.77  )-----------( 176      ( 03 Sep 2024 07:06 )             26.6     09-03    129<L>  |  95<L>  |  9   ----------------------------<  116<H>  3.5   |  27  |  0.50    Ca    8.4<L>      03 Sep 2024 07:06  Phos  1.5       Mg     1.7         TPro  6.4  /  Alb  2.4<L>  /  TBili  18.8<H>  /  DBili  14.5<H>  /  AST  275<H>  /  ALT  35  /  AlkPhos  214<H>      PT/INR - ( 03 Sep 2024 07:06 )   PT: 21.9 sec;   INR: 1.98 ratio           Albumin: Albumin: 2.4 g/dL ( @ 07:06)      Allergies    No Known Allergies    Intolerances      MEDICATIONS  (STANDING):  albumin human 25% IVPB 100 milliLiter(s) IV Intermittent every 12 hours  cholecalciferol 2000 Unit(s) Oral daily  folic acid 1 milliGRAM(s) Oral daily  magnesium oxide 400 milliGRAM(s) Oral two times a day with meals  piperacillin/tazobactam IVPB.. 3.375 Gram(s) IV Intermittent every 8 hours  rifAXIMin 550 milliGRAM(s) Oral two times a day  trimethoprim  160 mG/sulfamethoxazole 800 mG 1 Tablet(s) Oral every 12 hours  ursodiol Capsule 300 milliGRAM(s) Oral every 12 hours    MEDICATIONS  (PRN):  acetaminophen     Tablet .. 650 milliGRAM(s) Oral every 6 hours PRN Temp greater or equal to 38C (100.4F), Mild Pain (1 - 3)  aluminum hydroxide/magnesium hydroxide/simethicone Suspension 30 milliLiter(s) Oral every 4 hours PRN Dyspepsia  benzonatate 100 milliGRAM(s) Oral every 8 hours PRN for cough  bisacodyl 5 milliGRAM(s) Oral daily PRN Constipation  morphine  - Injectable 2 milliGRAM(s) IV Push every 4 hours PRN Severe Pain (7 - 10)  morphine  IR 15 milliGRAM(s) Oral every 6 hours PRN Severe Pain (7 - 10)  ondansetron Injectable 4 milliGRAM(s) IV Push every 8 hours PRN Nausea and/or Vomiting  polyethylene glycol 3350 17 Gram(s) Oral daily PRN for constipation  zolpidem 5 milliGRAM(s) Oral at bedtime PRN Insomnia      RADIOLOGY: Statement Selected